# Patient Record
Sex: FEMALE | Race: WHITE | Employment: OTHER | URBAN - METROPOLITAN AREA
[De-identification: names, ages, dates, MRNs, and addresses within clinical notes are randomized per-mention and may not be internally consistent; named-entity substitution may affect disease eponyms.]

---

## 2020-07-20 RX ORDER — SODIUM CHLORIDE 0.9 % (FLUSH) 0.9 %
5-40 SYRINGE (ML) INJECTION AS NEEDED
Status: CANCELLED | OUTPATIENT
Start: 2020-07-20

## 2020-07-20 RX ORDER — SODIUM CHLORIDE 0.9 % (FLUSH) 0.9 %
5-40 SYRINGE (ML) INJECTION EVERY 8 HOURS
Status: CANCELLED | OUTPATIENT
Start: 2020-07-20

## 2020-07-22 ENCOUNTER — ANESTHESIA EVENT (OUTPATIENT)
Dept: SURGERY | Age: 80
End: 2020-07-22
Payer: MEDICARE

## 2020-07-23 ENCOUNTER — ANESTHESIA (OUTPATIENT)
Dept: SURGERY | Age: 80
End: 2020-07-23
Payer: MEDICARE

## 2020-07-23 ENCOUNTER — APPOINTMENT (OUTPATIENT)
Dept: GENERAL RADIOLOGY | Age: 80
End: 2020-07-23
Attending: ORTHOPAEDIC SURGERY
Payer: MEDICARE

## 2020-07-23 ENCOUNTER — HOSPITAL ENCOUNTER (OUTPATIENT)
Age: 80
Setting detail: OUTPATIENT SURGERY
Discharge: HOME | End: 2020-07-23
Attending: ORTHOPAEDIC SURGERY | Admitting: ORTHOPAEDIC SURGERY
Payer: MEDICARE

## 2020-07-23 VITALS
SYSTOLIC BLOOD PRESSURE: 204 MMHG | HEIGHT: 65 IN | TEMPERATURE: 98 F | HEART RATE: 69 BPM | RESPIRATION RATE: 16 BRPM | WEIGHT: 161 LBS | OXYGEN SATURATION: 94 % | DIASTOLIC BLOOD PRESSURE: 93 MMHG | BODY MASS INDEX: 26.82 KG/M2

## 2020-07-23 LAB — HGB BLD-MCNC: 11.3 G/DL (ref 11.7–15.4)

## 2020-07-23 PROCEDURE — 76060000033 HC ANESTHESIA 1 TO 1.5 HR: Performed by: ORTHOPAEDIC SURGERY

## 2020-07-23 PROCEDURE — 85018 HEMOGLOBIN: CPT

## 2020-07-23 PROCEDURE — A4565 SLINGS: HCPCS | Performed by: ORTHOPAEDIC SURGERY

## 2020-07-23 PROCEDURE — 77030002986 HC SUT PROL J&J -A: Performed by: ORTHOPAEDIC SURGERY

## 2020-07-23 PROCEDURE — 74011000250 HC RX REV CODE- 250: Performed by: NURSE ANESTHETIST, CERTIFIED REGISTERED

## 2020-07-23 PROCEDURE — 76010010054 HC POST OP PAIN BLOCK: Performed by: ORTHOPAEDIC SURGERY

## 2020-07-23 PROCEDURE — C1713 ANCHOR/SCREW BN/BN,TIS/BN: HCPCS | Performed by: ORTHOPAEDIC SURGERY

## 2020-07-23 PROCEDURE — 77030033681 HC SPLNT P-CUT SAF BSNM -A: Performed by: ORTHOPAEDIC SURGERY

## 2020-07-23 PROCEDURE — 74011250636 HC RX REV CODE- 250/636: Performed by: ANESTHESIOLOGY

## 2020-07-23 PROCEDURE — 77030003737 HC BIT DRL ACMD -C: Performed by: ORTHOPAEDIC SURGERY

## 2020-07-23 PROCEDURE — 76210000006 HC OR PH I REC 0.5 TO 1 HR: Performed by: ORTHOPAEDIC SURGERY

## 2020-07-23 PROCEDURE — 76010000161 HC OR TIME 1 TO 1.5 HR INTENSV-TIER 1: Performed by: ORTHOPAEDIC SURGERY

## 2020-07-23 PROCEDURE — 77030018836 HC SOL IRR NACL ICUM -A: Performed by: ORTHOPAEDIC SURGERY

## 2020-07-23 PROCEDURE — 77030000032 HC CUF TRNQT ZIMM -B: Performed by: ORTHOPAEDIC SURGERY

## 2020-07-23 PROCEDURE — 77030002933 HC SUT MCRYL J&J -A: Performed by: ORTHOPAEDIC SURGERY

## 2020-07-23 PROCEDURE — 77030008829 HC WRE K ACMD -B: Performed by: ORTHOPAEDIC SURGERY

## 2020-07-23 PROCEDURE — 77030010507 HC ADH SKN DERMBND J&J -B: Performed by: ORTHOPAEDIC SURGERY

## 2020-07-23 PROCEDURE — 76210000020 HC REC RM PH II FIRST 0.5 HR: Performed by: ORTHOPAEDIC SURGERY

## 2020-07-23 PROCEDURE — 74011250636 HC RX REV CODE- 250/636

## 2020-07-23 PROCEDURE — 74011000250 HC RX REV CODE- 250: Performed by: ANESTHESIOLOGY

## 2020-07-23 PROCEDURE — 76942 ECHO GUIDE FOR BIOPSY: CPT | Performed by: ORTHOPAEDIC SURGERY

## 2020-07-23 PROCEDURE — 77030003602 HC NDL NRV BLK BBMI -B: Performed by: ANESTHESIOLOGY

## 2020-07-23 PROCEDURE — 74011250636 HC RX REV CODE- 250/636: Performed by: NURSE ANESTHETIST, CERTIFIED REGISTERED

## 2020-07-23 DEVICE — 2.3MM X 14MM LKG VARIABLE ANGLE SCREW
Type: IMPLANTABLE DEVICE | Site: WRIST | Status: FUNCTIONAL
Brand: ACUMED

## 2020-07-23 DEVICE — 2.3MM X 16MM LOCKING CORTICAL SCREW
Type: IMPLANTABLE DEVICE | Site: WRIST | Status: FUNCTIONAL
Brand: ACUMED

## 2020-07-23 DEVICE — 2.3MM X 18MM LOCKING CORTICAL SCREW
Type: IMPLANTABLE DEVICE | Site: WRIST | Status: FUNCTIONAL
Brand: ACUMED

## 2020-07-23 DEVICE — 3.5MM X 10MM LOCKING HEXALOBE SCREW
Type: IMPLANTABLE DEVICE | Site: WRIST | Status: FUNCTIONAL
Brand: ACUMED

## 2020-07-23 DEVICE — 2.3MM X 14MM LOCKING CORTICAL SCREW
Type: IMPLANTABLE DEVICE | Site: WRIST | Status: FUNCTIONAL
Brand: ACUMED

## 2020-07-23 DEVICE — 3.5MM X 14MM NON-LOCKING HEXALOBE SCREW
Type: IMPLANTABLE DEVICE | Site: WRIST | Status: FUNCTIONAL
Brand: ACUMED

## 2020-07-23 DEVICE — 3.5MM X 12MM LOCKING HEXALOBE SCREW
Type: IMPLANTABLE DEVICE | Site: WRIST | Status: FUNCTIONAL
Brand: ACUMED

## 2020-07-23 DEVICE — ACU-LOC® 2 VDR PLT STD L
Type: IMPLANTABLE DEVICE | Site: WRIST | Status: FUNCTIONAL
Brand: ACUMED

## 2020-07-23 RX ORDER — ALBUTEROL SULFATE 0.83 MG/ML
2.5 SOLUTION RESPIRATORY (INHALATION) AS NEEDED
Status: DISCONTINUED | OUTPATIENT
Start: 2020-07-23 | End: 2020-07-23 | Stop reason: HOSPADM

## 2020-07-23 RX ORDER — OXYCODONE HYDROCHLORIDE 5 MG/1
5 TABLET ORAL
Status: DISCONTINUED | OUTPATIENT
Start: 2020-07-23 | End: 2020-07-23 | Stop reason: HOSPADM

## 2020-07-23 RX ORDER — ONDANSETRON 2 MG/ML
4 INJECTION INTRAMUSCULAR; INTRAVENOUS ONCE
Status: DISCONTINUED | OUTPATIENT
Start: 2020-07-23 | End: 2020-07-23 | Stop reason: HOSPADM

## 2020-07-23 RX ORDER — CEFAZOLIN SODIUM/WATER 2 G/20 ML
2 SYRINGE (ML) INTRAVENOUS ONCE
Status: COMPLETED | OUTPATIENT
Start: 2020-07-23 | End: 2020-07-23

## 2020-07-23 RX ORDER — LIDOCAINE HYDROCHLORIDE 10 MG/ML
0.1 INJECTION INFILTRATION; PERINEURAL AS NEEDED
Status: DISCONTINUED | OUTPATIENT
Start: 2020-07-23 | End: 2020-07-23 | Stop reason: HOSPADM

## 2020-07-23 RX ORDER — LIDOCAINE HYDROCHLORIDE 20 MG/ML
INJECTION, SOLUTION EPIDURAL; INFILTRATION; INTRACAUDAL; PERINEURAL AS NEEDED
Status: DISCONTINUED | OUTPATIENT
Start: 2020-07-23 | End: 2020-07-23 | Stop reason: HOSPADM

## 2020-07-23 RX ORDER — MIDAZOLAM HYDROCHLORIDE 1 MG/ML
2 INJECTION, SOLUTION INTRAMUSCULAR; INTRAVENOUS
Status: DISCONTINUED | OUTPATIENT
Start: 2020-07-23 | End: 2020-07-23 | Stop reason: HOSPADM

## 2020-07-23 RX ORDER — BUPIVACAINE HYDROCHLORIDE AND EPINEPHRINE 5; 5 MG/ML; UG/ML
INJECTION, SOLUTION EPIDURAL; INTRACAUDAL; PERINEURAL
Status: COMPLETED | OUTPATIENT
Start: 2020-07-23 | End: 2020-07-23

## 2020-07-23 RX ORDER — HYDROMORPHONE HYDROCHLORIDE 2 MG/ML
0.5 INJECTION, SOLUTION INTRAMUSCULAR; INTRAVENOUS; SUBCUTANEOUS
Status: DISCONTINUED | OUTPATIENT
Start: 2020-07-23 | End: 2020-07-23 | Stop reason: HOSPADM

## 2020-07-23 RX ORDER — DIPHENHYDRAMINE HYDROCHLORIDE 50 MG/ML
12.5 INJECTION, SOLUTION INTRAMUSCULAR; INTRAVENOUS
Status: DISCONTINUED | OUTPATIENT
Start: 2020-07-23 | End: 2020-07-23 | Stop reason: HOSPADM

## 2020-07-23 RX ORDER — SODIUM CHLORIDE 0.9 % (FLUSH) 0.9 %
5-40 SYRINGE (ML) INJECTION AS NEEDED
Status: DISCONTINUED | OUTPATIENT
Start: 2020-07-23 | End: 2020-07-23 | Stop reason: HOSPADM

## 2020-07-23 RX ORDER — PROPOFOL 10 MG/ML
INJECTION, EMULSION INTRAVENOUS AS NEEDED
Status: DISCONTINUED | OUTPATIENT
Start: 2020-07-23 | End: 2020-07-23 | Stop reason: HOSPADM

## 2020-07-23 RX ORDER — SODIUM CHLORIDE 0.9 % (FLUSH) 0.9 %
5-40 SYRINGE (ML) INJECTION EVERY 8 HOURS
Status: DISCONTINUED | OUTPATIENT
Start: 2020-07-23 | End: 2020-07-23 | Stop reason: HOSPADM

## 2020-07-23 RX ORDER — FENTANYL CITRATE 50 UG/ML
100 INJECTION, SOLUTION INTRAMUSCULAR; INTRAVENOUS ONCE
Status: DISCONTINUED | OUTPATIENT
Start: 2020-07-23 | End: 2020-07-23 | Stop reason: HOSPADM

## 2020-07-23 RX ORDER — OXYCODONE HYDROCHLORIDE 5 MG/1
10 TABLET ORAL
Status: DISCONTINUED | OUTPATIENT
Start: 2020-07-23 | End: 2020-07-23 | Stop reason: HOSPADM

## 2020-07-23 RX ORDER — MIDAZOLAM HYDROCHLORIDE 1 MG/ML
2 INJECTION, SOLUTION INTRAMUSCULAR; INTRAVENOUS ONCE
Status: COMPLETED | OUTPATIENT
Start: 2020-07-23 | End: 2020-07-23

## 2020-07-23 RX ORDER — SODIUM CHLORIDE, SODIUM LACTATE, POTASSIUM CHLORIDE, CALCIUM CHLORIDE 600; 310; 30; 20 MG/100ML; MG/100ML; MG/100ML; MG/100ML
100 INJECTION, SOLUTION INTRAVENOUS CONTINUOUS
Status: DISCONTINUED | OUTPATIENT
Start: 2020-07-23 | End: 2020-07-23 | Stop reason: HOSPADM

## 2020-07-23 RX ORDER — PROPOFOL 10 MG/ML
INJECTION, EMULSION INTRAVENOUS
Status: DISCONTINUED | OUTPATIENT
Start: 2020-07-23 | End: 2020-07-23 | Stop reason: HOSPADM

## 2020-07-23 RX ORDER — NALOXONE HYDROCHLORIDE 0.4 MG/ML
0.1 INJECTION, SOLUTION INTRAMUSCULAR; INTRAVENOUS; SUBCUTANEOUS AS NEEDED
Status: DISCONTINUED | OUTPATIENT
Start: 2020-07-23 | End: 2020-07-23 | Stop reason: HOSPADM

## 2020-07-23 RX ADMIN — SODIUM CHLORIDE, SODIUM LACTATE, POTASSIUM CHLORIDE, AND CALCIUM CHLORIDE 100 ML/HR: 600; 310; 30; 20 INJECTION, SOLUTION INTRAVENOUS at 06:50

## 2020-07-23 RX ADMIN — LIDOCAINE HYDROCHLORIDE 20 MG: 20 INJECTION, SOLUTION EPIDURAL; INFILTRATION; INTRACAUDAL; PERINEURAL at 08:05

## 2020-07-23 RX ADMIN — BUPIVACAINE HYDROCHLORIDE AND EPINEPHRINE BITARTRATE 10 ML: 5; .005 INJECTION, SOLUTION EPIDURAL; INTRACAUDAL; PERINEURAL at 07:04

## 2020-07-23 RX ADMIN — PROPOFOL 40 MG: 10 INJECTION, EMULSION INTRAVENOUS at 08:05

## 2020-07-23 RX ADMIN — Medication 2 G: at 08:03

## 2020-07-23 RX ADMIN — MIDAZOLAM 2 MG: 1 INJECTION INTRAMUSCULAR; INTRAVENOUS at 07:01

## 2020-07-23 RX ADMIN — MEPIVACAINE HYDROCHLORIDE 20 ML: 20 INJECTION, SOLUTION EPIDURAL; INFILTRATION at 07:04

## 2020-07-23 RX ADMIN — PROPOFOL 100 MCG/KG/MIN: 10 INJECTION, EMULSION INTRAVENOUS at 08:05

## 2020-07-23 NOTE — ANESTHESIA PROCEDURE NOTES
Peripheral Block    Start time: 7/23/2020 7:01 AM  End time: 7/23/2020 7:04 AM  Performed by: Abdiel Motta MD  Authorized by: Abdiel Motta MD       Pre-procedure:    Indications: at surgeon's request and post-op pain management    Preanesthetic Checklist: patient identified, risks and benefits discussed, site marked, timeout performed, anesthesia consent given and patient being monitored    Timeout Time: 07:01          Block Type:   Block Type:  Infraclavicular  Laterality:  Right  Monitoring:  Standard ASA monitoring, continuous pulse ox, frequent vital sign checks, heart rate, oxygen and responsive to questions  Injection Technique:  Single shot  Procedures: ultrasound guided    Patient Position: supine  Prep: chlorhexidine    Needle Type:  Stimuplex  Needle Gauge:  21 G  Needle Localization:  Ultrasound guidance and anatomical landmarks    Assessment:  Number of attempts:  1  Injection Assessment:  Incremental injection every 5 mL, local visualized surrounding nerve on ultrasound, negative aspiration for blood, no paresthesia, no intravascular symptoms and ultrasound image on chart  Patient tolerance:  Patient tolerated the procedure well with no immediate complications

## 2020-07-23 NOTE — BRIEF OP NOTE
Brief Postoperative Note    Patient: Gertrude Rosario  YOB: 1940  MRN: [id number]    Date of Procedure: 7/23/2020     Pre-Op Diagnosis: Displaced fracture of distal end of left radius [S52.502A]    Post-Op Diagnosis: Same as preoperative diagnosis. Procedure(s):  LEFT DISTAL RADIUS OPEN REDUCTION INTERNAL FIXATION    Surgeon(s): Romayne Parkinson, MD    Surgical Assistant: None    Anesthesia: Regional     Estimated Blood Loss (mL): Minimal    Complications: None    Specimens: * No specimens in log *     Implants:   Implant Name Type Inv.  Item Serial No.  Lot No. LRB No. Used Action   SCREW 3.5MM X 14MM NON-LOCKING - [id number]  SCREW 3.5MM X 14MM NON-LOCKING  [employer] [id number] Left 1 Implanted   SCR BNE AUDREY LCK BRIAN 2.3X18MM --  - [id number]  SCR BNE AUDREY LCK BRIAN 2.3X18MM --   [employer] [id number] Left 4 Implanted   SCR BNE AUDREY LCK BRIAN 2.3X16MM --  - [id number]  SCR BNE AUDREY LCK BRIAN 2.3X16MM --   [employer] [id number] Left 1 Implanted   SCR BNE AUDREY LCK BRIAN 2.3X14MM --  - [id number]  SCR BNE AUDREY LCK BRIAN 2.3X14MM --   [employer] [id number] Left 1 Implanted   PLATE STD VDR ACULOC 2 LT --  - [id number]  PLATE STD VDR ACULOC 2 LT --   [employer] [id number] Left 1 Implanted   SCR BNE LCK VA 2.3X14MM --  - [id number]  SCR BNE LCK VA 2.3X14MM --   [employer] [id number] Left 1 Implanted   SCR BNE LCK HEXALOBE 3.5X12MM -- F/ELBOW PLT SYS - [id number]  SCR BNE LCK HEXALOBE 3.5X12MM -- F/ELBOW PLT SYS  [employer] [id number] Left 1 Implanted   SCR BNE LCK HEXALOBE 3.5X10MM -- F/ELBOW PLT SYS - [id number]  SCR BNE LCK HEXALOBE 3.5X10MM -- F/ELBOW PLT SYS  [employer] [id number] Left 1 Implanted       Drains: * No LDAs found *    Findings: see dictation    Electronically Signed by Odilia Gomez MD on 7/23/2020 at 9:03 AM

## 2020-07-23 NOTE — DISCHARGE INSTRUCTIONS
Keep splint clean, dry and intact until seen in office. Move fingers, elevate, and ice to prevent swelling. No lifting. Sling can be taken off after block wears off in about 16-18 hours. Take pain medicine before she goes to bed. DIET  · Clear liquids until no nausea or vomiting; then light diet for the first day. · Advance to regular diet on second day, unless your doctor orders otherwise. · If nausea and vomiting continues, call your doctor. PAIN  · Take pain medication as directed by your doctor. · Call your doctor if pain is NOT relieved by medication. · DO NOT take aspirin of blood thinners unless directed by your doctor. CALL YOUR DOCTOR IF   · Excessive bleeding that does not stop after holding pressure over the area  · Temperature of 101 degrees F or above  · Excessive redness, swelling or bruising, and/ or green or yellow, smelly discharge from incision    AFTER ANESTHESIA   · For the first 24 hours: DO NOT Drive, Drink alcoholic beverages, or Make important decisions. · Be aware of dizziness following anesthesia and while taking pain medication. After general anesthesia or intravenous sedation, for 24 hours or while taking prescription Narcotics:  · Limit your activities  · A responsible adult needs to be with you for the next 24 hours  · Do not drive and operate hazardous machinery  · Do not make important personal or business decisions  · Do not drink alcoholic beverages  · If you have not urinated within 8 hours after discharge, please contact your surgeon on call. · If you have sleep apnea and have a CPAP machine, please use it for all naps and sleeping. · Please use caution when taking narcotics and any of your home medications that may cause drowsiness. *  Please give a list of your current medications to your Primary Care Provider.   *  Please update this list whenever your medications are discontinued, doses are      changed, or new medications (including over-the-counter products) are added. *  Please carry medication information at all times in case of emergency situations. These are general instructions for a healthy lifestyle:  No smoking/ No tobacco products/ Avoid exposure to second hand smoke  Surgeon General's Warning:  Quitting smoking now greatly reduces serious risk to your health. Obesity, smoking, and sedentary lifestyle greatly increases your risk for illness  A healthy diet, regular physical exercise & weight monitoring are important for maintaining a healthy lifestyle    You may be retaining fluid if you have a history of heart failure or if you experience any of the following symptoms:  Weight gain of 3 pounds or more overnight or 5 pounds in a week, increased swelling in our hands or feet or shortness of breath while lying flat in bed. Please call your doctor as soon as you notice any of these symptoms; do not wait until your next office visit. Advance Care Planning  People with COVID-19 may have no symptoms, mild symptoms, such as fever, cough, and shortness of breath or they may have more severe illness, developing severe and fatal pneumonia. As a result, Advance Care Planning with attention to naming a health care decision maker (someone you trust to make healthcare decisions for you if you could not speak for yourself) and sharing other health care preferences is important BEFORE a possible health crisis. Please contact your Primary Care Provider to discuss Advance Care Planning.      Preventing the Spread of Coronavirus Disease 2019 in Homes and Residential Communities  For the most recent information go to [employer]    Prevention steps for People with confirmed or suspected COVID-19 (including persons under investigation) who do not need to be hospitalized  and   People with confirmed COVID-19 who were hospitalized and determined to be medically stable to go home    Your healthcare provider and public health staff will evaluate whether you can be cared for at home. If it is determined that you do not need to be hospitalized and can be isolated at home, you will be monitored by staff from your local or state health department. You should follow the prevention steps below until a healthcare provider or local or state health department says you can return to your normal activities. Stay home except to get medical care  People who are mildly ill with COVID-19 are able to isolate at home during their illness. You should restrict activities outside your home, except for getting medical care. Do not go to work, school, or public areas. Avoid using public transportation, ride-sharing, or taxis. Separate yourself from other people and animals in your home  People: As much as possible, you should stay in a specific room and away from other people in your home. Also, you should use a separate bathroom, if available. Animals: You should restrict contact with pets and other animals while you are sick with COVID-19, just like you would around other people. Although there have not been reports of pets or other animals becoming sick with COVID-19, it is still recommended that people sick with COVID-19 limit contact with animals until more information is known about the virus. When possible, have another member of your household care for your animals while you are sick. If you are sick with COVID-19, avoid contact with your pet, including petting, snuggling, being kissed or licked, and sharing food. If you must care for your pet or be around animals while you are sick, wash your hands before and after you interact with pets and wear a facemask. Call ahead before visiting your doctor  If you have a medical appointment, call the healthcare provider and tell them that you have or may have COVID-19.  This will help the healthcare providers office take steps to keep other people from getting infected or exposed. Wear a facemask  You should wear a facemask when you are around other people (e.g., sharing a room or vehicle) or pets and before you enter a healthcare providers office. If you are not able to wear a facemask (for example, because it causes trouble breathing), then people who live with you should not stay in the same room with you, or they should wear a facemask if they enter your room. Cover your coughs and sneezes  Cover your mouth and nose with a tissue when you cough or sneeze. Throw used tissues in a lined trash can. Immediately wash your hands with soap and water for at least 20 seconds or, if soap and water are not available, clean your hands with an alcohol-based hand  that contains at least 60% alcohol. Clean your hands often  Wash your hands often with soap and water for at least 20 seconds, especially after blowing your nose, coughing, or sneezing; going to the bathroom; and before eating or preparing food. If soap and water are not readily available, use an alcohol-based hand  with at least 60% alcohol, covering all surfaces of your hands and rubbing them together until they feel dry. Soap and water are the best option if hands are visibly dirty. Avoid touching your eyes, nose, and mouth with unwashed hands. Avoid sharing personal household items  You should not share dishes, drinking glasses, cups, eating utensils, towels, or bedding with other people or pets in your home. After using these items, they should be washed thoroughly with soap and water. Clean all high-touch surfaces everyday  High touch surfaces include counters, tabletops, doorknobs, bathroom fixtures, toilets, phones, keyboards, tablets, and bedside tables. Also, clean any surfaces that may have blood, stool, or body fluids on them. Use a household cleaning spray or wipe, according to the label instructions.  Labels contain instructions for safe and effective use of the cleaning product including precautions you should take when applying the product, such as wearing gloves and making sure you have good ventilation during use of the product. Monitor your symptoms  Seek prompt medical attention if your illness is worsening (e.g., difficulty breathing). Before seeking care, call your healthcare provider and tell them that you have, or are being evaluated for, COVID-19. Put on a facemask before you enter the facility. These steps will help the healthcare providers office to keep other people in the office or waiting room from getting infected or exposed. Ask your healthcare provider to call the local or state health department. Persons who are placed under active monitoring or facilitated self-monitoring should follow instructions provided by their local health department or occupational health professionals, as appropriate. When working with your local health department check their available hours. If you have a medical emergency and need to call 911, notify the dispatch personnel that you have, or are being evaluated for COVID-19. If possible, put on a facemask before emergency medical services arrive. Discontinuing home isolation  Patients with confirmed COVID-19 should remain under home isolation precautions until the risk of secondary transmission to others is thought to be low. The decision to discontinue home isolation precautions should be made on a case-by-case basis, in consultation with healthcare providers and state and local health departments.

## 2020-07-23 NOTE — ANESTHESIA POSTPROCEDURE EVALUATION
Procedure(s):  LEFT DISTAL RADIUS OPEN REDUCTION INTERNAL FIXATION.     total IV anesthesia    Anesthesia Post Evaluation      Multimodal analgesia: multimodal analgesia used between 6 hours prior to anesthesia start to PACU discharge  Patient location during evaluation: PACU  Patient participation: complete - patient participated  Level of consciousness: awake and awake and alert  Pain management: adequate  Airway patency: patent  Anesthetic complications: no  Cardiovascular status: acceptable  Respiratory status: acceptable  Hydration status: acceptable  Post anesthesia nausea and vomiting:  controlled      INITIAL Post-op Vital signs:   Vitals Value Taken Time   /86 7/23/2020  9:38 AM   Temp 36.8 °C (98.2 °F) 7/23/2020  9:08 AM   Pulse 64 7/23/2020  9:38 AM   Resp 16 7/23/2020  9:38 AM   SpO2 93 % 7/23/2020  9:38 AM

## 2020-07-23 NOTE — PROGRESS NOTES
's pre-procedure visit and prayer with patient as requested.     Rita Gaitan MDiv, BS  Board Certified

## 2020-07-23 NOTE — ANESTHESIA PREPROCEDURE EVALUATION
Relevant Problems   No relevant active problems       Anesthetic History               Review of Systems / Medical History  Patient summary reviewed, nursing notes reviewed and pertinent labs reviewed    Pulmonary                   Neuro/Psych              Cardiovascular                  Exercise tolerance: >4 METS     GI/Hepatic/Renal                Endo/Other             Other Findings              Physical Exam    Airway  Mallampati: II  TM Distance: 4 - 6 cm  Neck ROM: normal range of motion   Mouth opening: Normal     Cardiovascular  Regular rate and rhythm,  S1 and S2 normal,  no murmur, click, rub, or gallop             Dental  No notable dental hx       Pulmonary  Breath sounds clear to auscultation               Abdominal         Other Findings            Anesthetic Plan    ASA: 2  Anesthesia type: total IV anesthesia      Post-op pain plan if not by surgeon: peripheral nerve block single    Induction: Intravenous  Anesthetic plan and risks discussed with: Patient

## 2020-07-23 NOTE — PERIOP NOTES
9:29 AM    Notified Dr. Rivka Mooer about patient having frequent PAC's. He does not want to proceed with any treatment.

## 2020-07-27 NOTE — OP NOTES
Operative Report       7/23/20  Preoperative diagnosis:  Displaced fracture of distal end of left radius [S52.502A]    Postoperative diagnosis: Displaced fracture of distal end of left radius [S52.502A]    Surgeon(s) and Role:     * Bill Ibarra MD - Primary     Anesthesia: Regional Local with MAC. Procedures: Procedure(s):  LEFT DISTAL RADIUS OPEN REDUCTION INTERNAL FIXATION   3 part intra articular       EBL/IV FLUIDS: Per Anesthesia. COMPLICATIONS: None. DISPOSITION: Stable to recovery room. INDICATIONS FOR PROCEDURE: The patient is a pleasant 76-year-old female with history of left displaced distal radius fracture that has failed nonoperative measures. Risks and benefits of the procedure were discussed including but not limited to bleeding, infection, injury to adjacent structures , consisting of tendon, artery or nerve, need for additional procedures, wound dehiscence, scar formation, incomplete resolution of symptoms, recurrence of symptoms, transient neurapraxia, decreased range of motion, hypersensitivity, recurrence of deformity, pin tract infection, malunion, nonunion, failure of hardware, need for removal of hardware, irritation of tendon, artery, decreased range of motion, stiffness, pain, as well as anesthetic risk. Informed consent was obtained. PROCEDURE IN DETAIL: The patient was seen and marked in the preoperative suite. The patient was taken back to the OR, placed on the table in supine position with left upper extremities on hand tables. Left upper extremities were prepped and draped in standard sterile fashion. A formal timeout was performed confirming patient identification, preoperative antibiotics, and planned operative procedure. Anesthesia administered a plexus block prior. We exsanguinated the left upper extremity and the tourniquet was placed up to 250 mmHg. A standard FCR incision was made dissecting down retracting the FPL.   The pronator quadratus was ruptured. It was then incised with a standard L fashion. We performed a Z lengthening brachial radialis release. This was indeed a greater than 3 part intra-articular fracture with a large volar dominguez fracture and a secondary split over the lunate side into the joint. We utilized traction in order to pull the fracture out to length. We keyed the fracture pieces back into position. We utilized an acumed volar locked plate to help reduce and hold the fragments back into anatomic position. We needed to use a standard plate in order to get the lunate butress so that made our radial styloid screws slightly shorter. The narrow plate did not give the the support needed. Pins were placed to make sure we were subchondral.  We sequentially drilled and filled with distal locking screws we drilled locking screws in the shaft. Final radiographs demonstrated adequate reduction of the fracture and placement of the hardware. We irrigated copiously with normal saline. Her DRUJ was stable at the end of the procedure. We repaired the brachial radialis release. We closed the skin with the subcutaneous Monocryl and a running subcuticular Monocryl and Dermabond glue. She was then placed into a volar splint. The tourniquet was let down, the fingers had brisk capillary refill and a soft sterile dressing was placed. POSTOPERATIVE CARE: Early motion. No heavy lifting.  Followup in 2 weeks for suture removal.    Closure: Primary    Complications: None     Signed By: Christophe Max MD

## 2021-02-11 PROBLEM — G25.81 RLS (RESTLESS LEGS SYNDROME): Status: ACTIVE | Noted: 2021-02-11

## 2021-02-11 PROBLEM — R05.3 CHRONIC COUGH: Status: ACTIVE | Noted: 2021-02-11

## 2021-02-11 PROBLEM — R29.818 SUSPECTED SLEEP APNEA: Status: ACTIVE | Noted: 2021-02-11

## 2021-02-11 PROBLEM — R06.09 DYSPNEA ON EXERTION: Status: ACTIVE | Noted: 2021-02-11

## 2021-03-08 ENCOUNTER — HOSPITAL ENCOUNTER (OUTPATIENT)
Dept: SLEEP MEDICINE | Age: 81
Discharge: HOME | End: 2021-03-08
Payer: MEDICARE

## 2021-03-08 PROBLEM — I48.11 LONGSTANDING PERSISTENT ATRIAL FIBRILLATION (HCC): Status: ACTIVE | Noted: 2021-03-08

## 2021-03-08 PROCEDURE — 95806 SLEEP STUDY UNATT&RESP EFFT: CPT

## 2021-04-12 ENCOUNTER — HOSPITAL ENCOUNTER (OUTPATIENT)
Dept: LAB | Age: 81
Discharge: HOME | End: 2021-04-12
Payer: MEDICARE

## 2021-04-12 DIAGNOSIS — R93.1 ABNORMAL NUCLEAR CARDIAC IMAGING TEST: ICD-10-CM

## 2021-04-12 DIAGNOSIS — R06.09 DYSPNEA ON EXERTION: ICD-10-CM

## 2021-04-12 DIAGNOSIS — I48.11 LONGSTANDING PERSISTENT ATRIAL FIBRILLATION (HCC): ICD-10-CM

## 2021-04-12 LAB
ANION GAP SERPL CALC-SCNC: 2 MMOL/L (ref 7–16)
BASOPHILS # BLD: 0.1 K/UL (ref 0–0.2)
BASOPHILS NFR BLD: 1 % (ref 0–2)
BUN SERPL-MCNC: 23 MG/DL (ref 8–23)
CALCIUM SERPL-MCNC: 9.4 MG/DL (ref 8.3–10.4)
CHLORIDE SERPL-SCNC: 108 MMOL/L (ref 98–107)
CO2 SERPL-SCNC: 29 MMOL/L (ref 21–32)
CREAT SERPL-MCNC: 0.94 MG/DL (ref 0.6–1)
DIFFERENTIAL METHOD BLD: ABNORMAL
EOSINOPHIL # BLD: 0.1 K/UL (ref 0–0.8)
EOSINOPHIL NFR BLD: 1 % (ref 0.5–7.8)
ERYTHROCYTE [DISTWIDTH] IN BLOOD BY AUTOMATED COUNT: 14.5 % (ref 11.9–14.6)
GLUCOSE SERPL-MCNC: 96 MG/DL (ref 65–100)
HCT VFR BLD AUTO: 37.3 % (ref 35.8–46.3)
HGB BLD-MCNC: 12.1 G/DL (ref 11.7–15.4)
IMM GRANULOCYTES # BLD AUTO: 0.2 K/UL (ref 0–0.5)
IMM GRANULOCYTES NFR BLD AUTO: 3 % (ref 0–5)
INR PPP: 2.1
LYMPHOCYTES # BLD: 2 K/UL (ref 0.5–4.6)
LYMPHOCYTES NFR BLD: 22 % (ref 13–44)
MCH RBC QN AUTO: 32 PG (ref 26.1–32.9)
MCHC RBC AUTO-ENTMCNC: 32.4 G/DL (ref 31.4–35)
MCV RBC AUTO: 98.7 FL (ref 79.6–97.8)
MONOCYTES # BLD: 0.5 K/UL (ref 0.1–1.3)
MONOCYTES NFR BLD: 5 % (ref 4–12)
NEUTS SEG # BLD: 6 K/UL (ref 1.7–8.2)
NEUTS SEG NFR BLD: 68 % (ref 43–78)
NRBC # BLD: 0 K/UL (ref 0–0.2)
PLATELET # BLD AUTO: 162 K/UL (ref 150–450)
PMV BLD AUTO: 11.7 FL (ref 9.4–12.3)
POTASSIUM SERPL-SCNC: 4.5 MMOL/L (ref 3.5–5.1)
PROTHROMBIN TIME: 23.3 SEC (ref 12.5–14.7)
RBC # BLD AUTO: 3.78 M/UL (ref 4.05–5.2)
SODIUM SERPL-SCNC: 139 MMOL/L (ref 136–145)
WBC # BLD AUTO: 8.9 K/UL (ref 4.3–11.1)

## 2021-04-12 PROCEDURE — 85025 COMPLETE CBC W/AUTO DIFF WBC: CPT

## 2021-04-12 PROCEDURE — 80048 BASIC METABOLIC PNL TOTAL CA: CPT

## 2021-04-12 PROCEDURE — 36415 COLL VENOUS BLD VENIPUNCTURE: CPT

## 2021-04-12 PROCEDURE — 85610 PROTHROMBIN TIME: CPT

## 2021-04-21 NOTE — PROGRESS NOTES
Patient pre-assessment complete for [facility] scheduled for 1100, arrival time 0930. Patient verified using . Patient instructed to bring all home medications in labeled bottles on the day of procedure. NPO status reinforced. Patient informed to take a full dose aspirin 325mg  or 81 mg x 4 on the day of procedure. Patient instructed to HOLD lasix, warfarin X3 days. Instructed they can take all other medications excluding vitamins & supplements. Patient verbalizes understanding of all instructions & denies any questions at this time.

## 2021-04-22 ENCOUNTER — HOSPITAL ENCOUNTER (OUTPATIENT)
Dept: CARDIAC CATH/INVASIVE PROCEDURES | Age: 81
Discharge: HOME | End: 2021-04-23
Attending: INTERNAL MEDICINE | Admitting: INTERNAL MEDICINE
Payer: MEDICARE

## 2021-04-22 DIAGNOSIS — R93.1 ABNORMAL NUCLEAR CARDIAC IMAGING TEST: ICD-10-CM

## 2021-04-22 DIAGNOSIS — R06.09 DYSPNEA ON EXERTION: ICD-10-CM

## 2021-04-22 LAB
ACT BLD: 401 SECS (ref 70–128)
ATRIAL RATE: 326 BPM
ATRIAL RATE: 68 BPM
CALCULATED R AXIS, ECG10: -12 DEGREES
CALCULATED R AXIS, ECG10: -43 DEGREES
CALCULATED T AXIS, ECG11: -6 DEGREES
CALCULATED T AXIS, ECG11: 1 DEGREES
DIAGNOSIS, 93000: NORMAL
DIAGNOSIS, 93000: NORMAL
INR PPP: 1.1
PROTHROMBIN TIME: 14.3 SEC (ref 12.5–14.7)
Q-T INTERVAL, ECG07: 386 MS
Q-T INTERVAL, ECG07: 412 MS
QRS DURATION, ECG06: 84 MS
QRS DURATION, ECG06: 98 MS
QTC CALCULATION (BEZET), ECG08: 413 MS
QTC CALCULATION (BEZET), ECG08: 441 MS
VENTRICULAR RATE, ECG03: 69 BPM
VENTRICULAR RATE, ECG03: 69 BPM

## 2021-04-22 PROCEDURE — 77030029997 HC DEV COM RDL R BND TELE -B

## 2021-04-22 PROCEDURE — C1725 CATH, TRANSLUMIN NON-LASER: HCPCS

## 2021-04-22 PROCEDURE — 74011000250 HC RX REV CODE- 250: Performed by: INTERNAL MEDICINE

## 2021-04-22 PROCEDURE — 99152 MOD SED SAME PHYS/QHP 5/>YRS: CPT | Performed by: INTERNAL MEDICINE

## 2021-04-22 PROCEDURE — 74011250637 HC RX REV CODE- 250/637: Performed by: INTERNAL MEDICINE

## 2021-04-22 PROCEDURE — C1769 GUIDE WIRE: HCPCS

## 2021-04-22 PROCEDURE — 77030012468 HC VLV BLEEDBK CNTRL ABBT -B

## 2021-04-22 PROCEDURE — C1876 STENT, NON-COA/NON-COV W/DEL: HCPCS

## 2021-04-22 PROCEDURE — 93458 L HRT ARTERY/VENTRICLE ANGIO: CPT | Performed by: INTERNAL MEDICINE

## 2021-04-22 PROCEDURE — 99152 MOD SED SAME PHYS/QHP 5/>YRS: CPT

## 2021-04-22 PROCEDURE — 85610 PROTHROMBIN TIME: CPT

## 2021-04-22 PROCEDURE — 99218 HC RM OBSERVATION: CPT

## 2021-04-22 PROCEDURE — 92928 PRQ TCAT PLMT NTRAC ST 1 LES: CPT

## 2021-04-22 PROCEDURE — 74011000258 HC RX REV CODE- 258: Performed by: INTERNAL MEDICINE

## 2021-04-22 PROCEDURE — C1874 STENT, COATED/COV W/DEL SYS: HCPCS

## 2021-04-22 PROCEDURE — 93571 IV DOP VEL&/PRESS C FLO 1ST: CPT

## 2021-04-22 PROCEDURE — 93571 IV DOP VEL&/PRESS C FLO 1ST: CPT | Performed by: INTERNAL MEDICINE

## 2021-04-22 PROCEDURE — 77030015766

## 2021-04-22 PROCEDURE — 93458 L HRT ARTERY/VENTRICLE ANGIO: CPT

## 2021-04-22 PROCEDURE — C1887 CATHETER, GUIDING: HCPCS

## 2021-04-22 PROCEDURE — 92928 PRQ TCAT PLMT NTRAC ST 1 LES: CPT | Performed by: INTERNAL MEDICINE

## 2021-04-22 PROCEDURE — 93005 ELECTROCARDIOGRAM TRACING: CPT | Performed by: INTERNAL MEDICINE

## 2021-04-22 PROCEDURE — 74011250636 HC RX REV CODE- 250/636: Performed by: INTERNAL MEDICINE

## 2021-04-22 PROCEDURE — C1894 INTRO/SHEATH, NON-LASER: HCPCS

## 2021-04-22 PROCEDURE — 99153 MOD SED SAME PHYS/QHP EA: CPT

## 2021-04-22 PROCEDURE — 74011000636 HC RX REV CODE- 636: Performed by: INTERNAL MEDICINE

## 2021-04-22 PROCEDURE — 77030016699 HC CATH ANGI DX INFN1 CARD -A

## 2021-04-22 PROCEDURE — 85347 COAGULATION TIME ACTIVATED: CPT

## 2021-04-22 PROCEDURE — 2709999900 HC NON-CHARGEABLE SUPPLY

## 2021-04-22 RX ORDER — MORPHINE SULFATE 2 MG/ML
2 INJECTION, SOLUTION INTRAMUSCULAR; INTRAVENOUS
Status: DISCONTINUED | OUTPATIENT
Start: 2021-04-22 | End: 2021-04-23 | Stop reason: HOSPADM

## 2021-04-22 RX ORDER — LISINOPRIL 5 MG/1
5 TABLET ORAL DAILY
Status: DISCONTINUED | OUTPATIENT
Start: 2021-04-22 | End: 2021-04-23 | Stop reason: HOSPADM

## 2021-04-22 RX ORDER — CLOPIDOGREL BISULFATE 75 MG/1
600 TABLET ORAL ONCE
Status: COMPLETED | OUTPATIENT
Start: 2021-04-22 | End: 2021-04-22

## 2021-04-22 RX ORDER — SODIUM CHLORIDE 0.9 % (FLUSH) 0.9 %
5-40 SYRINGE (ML) INJECTION EVERY 8 HOURS
Status: DISCONTINUED | OUTPATIENT
Start: 2021-04-22 | End: 2021-04-23 | Stop reason: HOSPADM

## 2021-04-22 RX ORDER — SODIUM CHLORIDE 9 MG/ML
75 INJECTION, SOLUTION INTRAVENOUS CONTINUOUS
Status: DISPENSED | OUTPATIENT
Start: 2021-04-22 | End: 2021-04-23

## 2021-04-22 RX ORDER — GABAPENTIN 100 MG/1
100 CAPSULE ORAL DAILY
Status: DISCONTINUED | OUTPATIENT
Start: 2021-04-22 | End: 2021-04-23 | Stop reason: HOSPADM

## 2021-04-22 RX ORDER — FUROSEMIDE 40 MG/1
20 TABLET ORAL DAILY
Status: DISCONTINUED | OUTPATIENT
Start: 2021-04-23 | End: 2021-04-23 | Stop reason: HOSPADM

## 2021-04-22 RX ORDER — NITROGLYCERIN 0.4 MG/1
0.4 TABLET SUBLINGUAL
Status: DISCONTINUED | OUTPATIENT
Start: 2021-04-22 | End: 2021-04-23 | Stop reason: HOSPADM

## 2021-04-22 RX ORDER — OXYCODONE AND ACETAMINOPHEN 7.5; 325 MG/1; MG/1
1 TABLET ORAL
Status: DISCONTINUED | OUTPATIENT
Start: 2021-04-22 | End: 2021-04-23 | Stop reason: HOSPADM

## 2021-04-22 RX ORDER — MIDAZOLAM HYDROCHLORIDE 1 MG/ML
.5-2 INJECTION, SOLUTION INTRAMUSCULAR; INTRAVENOUS
Status: DISCONTINUED | OUTPATIENT
Start: 2021-04-22 | End: 2021-04-22 | Stop reason: HOSPADM

## 2021-04-22 RX ORDER — ASPIRIN 325 MG
325 TABLET ORAL DAILY
Status: DISCONTINUED | OUTPATIENT
Start: 2021-04-23 | End: 2021-04-22

## 2021-04-22 RX ORDER — LISINOPRIL 5 MG/1
5 TABLET ORAL DAILY
Status: DISCONTINUED | OUTPATIENT
Start: 2021-04-23 | End: 2021-04-22

## 2021-04-22 RX ORDER — GUAIFENESIN 100 MG/5ML
325 LIQUID (ML) ORAL ONCE
Status: DISCONTINUED | OUTPATIENT
Start: 2021-04-22 | End: 2021-04-22 | Stop reason: HOSPADM

## 2021-04-22 RX ORDER — HEPARIN SODIUM 200 [USP'U]/100ML
3 INJECTION, SOLUTION INTRAVENOUS CONTINUOUS
Status: DISCONTINUED | OUTPATIENT
Start: 2021-04-22 | End: 2021-04-22 | Stop reason: HOSPADM

## 2021-04-22 RX ORDER — ACETAMINOPHEN 325 MG/1
650 TABLET ORAL
Status: DISCONTINUED | OUTPATIENT
Start: 2021-04-22 | End: 2021-04-23 | Stop reason: HOSPADM

## 2021-04-22 RX ORDER — SODIUM CHLORIDE 0.9 % (FLUSH) 0.9 %
5-40 SYRINGE (ML) INJECTION AS NEEDED
Status: DISCONTINUED | OUTPATIENT
Start: 2021-04-22 | End: 2021-04-23 | Stop reason: HOSPADM

## 2021-04-22 RX ORDER — LIDOCAINE HYDROCHLORIDE 10 MG/ML
2-20 INJECTION, SOLUTION EPIDURAL; INFILTRATION; INTRACAUDAL; PERINEURAL ONCE
Status: COMPLETED | OUTPATIENT
Start: 2021-04-22 | End: 2021-04-22

## 2021-04-22 RX ORDER — FENTANYL CITRATE 50 UG/ML
25-50 INJECTION, SOLUTION INTRAMUSCULAR; INTRAVENOUS
Status: DISCONTINUED | OUTPATIENT
Start: 2021-04-22 | End: 2021-04-22 | Stop reason: HOSPADM

## 2021-04-22 RX ORDER — METOPROLOL SUCCINATE 50 MG/1
25 TABLET, EXTENDED RELEASE ORAL DAILY
Status: DISCONTINUED | OUTPATIENT
Start: 2021-04-23 | End: 2021-04-23 | Stop reason: HOSPADM

## 2021-04-22 RX ORDER — MAG HYDROX/ALUMINUM HYD/SIMETH 200-200-20
30 SUSPENSION, ORAL (FINAL DOSE FORM) ORAL AS NEEDED
Status: DISCONTINUED | OUTPATIENT
Start: 2021-04-22 | End: 2021-04-22 | Stop reason: HOSPADM

## 2021-04-22 RX ORDER — SODIUM CHLORIDE 0.9 % (FLUSH) 0.9 %
5 SYRINGE (ML) INJECTION AS NEEDED
Status: DISCONTINUED | OUTPATIENT
Start: 2021-04-22 | End: 2021-04-23 | Stop reason: HOSPADM

## 2021-04-22 RX ORDER — LORAZEPAM 1 MG/1
1 TABLET ORAL
Status: DISCONTINUED | OUTPATIENT
Start: 2021-04-22 | End: 2021-04-23 | Stop reason: HOSPADM

## 2021-04-22 RX ORDER — SODIUM CHLORIDE 9 MG/ML
75 INJECTION, SOLUTION INTRAVENOUS CONTINUOUS
Status: DISCONTINUED | OUTPATIENT
Start: 2021-04-22 | End: 2021-04-23 | Stop reason: HOSPADM

## 2021-04-22 RX ORDER — CLOPIDOGREL BISULFATE 75 MG/1
75 TABLET ORAL DAILY
Status: DISCONTINUED | OUTPATIENT
Start: 2021-04-23 | End: 2021-04-23 | Stop reason: HOSPADM

## 2021-04-22 RX ORDER — GUAIFENESIN 100 MG/5ML
81 LIQUID (ML) ORAL DAILY
Status: DISCONTINUED | OUTPATIENT
Start: 2021-04-23 | End: 2021-04-23 | Stop reason: HOSPADM

## 2021-04-22 RX ADMIN — ALUMINUM HYDROXIDE, MAGNESIUM HYDROXIDE, AND SIMETHICONE 30 ML: 200; 200; 20 SUSPENSION ORAL at 11:59

## 2021-04-22 RX ADMIN — WARFARIN SODIUM 7.5 MG: 2.5 TABLET ORAL at 17:34

## 2021-04-22 RX ADMIN — GABAPENTIN 100 MG: 100 CAPSULE ORAL at 22:34

## 2021-04-22 RX ADMIN — IOPAMIDOL 270 ML: 755 INJECTION, SOLUTION INTRAVENOUS at 12:00

## 2021-04-22 RX ADMIN — LIDOCAINE HYDROCHLORIDE 3 ML: 10 INJECTION, SOLUTION EPIDURAL; INFILTRATION; INTRACAUDAL; PERINEURAL at 11:14

## 2021-04-22 RX ADMIN — FENTANYL CITRATE 50 MCG: 50 INJECTION, SOLUTION INTRAMUSCULAR; INTRAVENOUS at 11:05

## 2021-04-22 RX ADMIN — HEPARIN SODIUM 2 ML: 10000 INJECTION, SOLUTION INTRAVENOUS; SUBCUTANEOUS at 11:14

## 2021-04-22 RX ADMIN — HEPARIN SODIUM 3 ML/HR: 5000 INJECTION, SOLUTION INTRAVENOUS; SUBCUTANEOUS at 11:14

## 2021-04-22 RX ADMIN — NITROGLYCERIN 0.2 MG: 200 INJECTION, SOLUTION INTRAVENOUS at 11:30

## 2021-04-22 RX ADMIN — Medication 10 ML: at 13:45

## 2021-04-22 RX ADMIN — SODIUM CHLORIDE 75 ML/HR: 900 INJECTION, SOLUTION INTRAVENOUS at 09:55

## 2021-04-22 RX ADMIN — NITROGLYCERIN 0.2 MG: 200 INJECTION, SOLUTION INTRAVENOUS at 11:54

## 2021-04-22 RX ADMIN — CLOPIDOGREL BISULFATE 600 MG: 75 TABLET ORAL at 11:59

## 2021-04-22 RX ADMIN — SODIUM CHLORIDE 75 ML/HR: 900 INJECTION, SOLUTION INTRAVENOUS at 13:44

## 2021-04-22 RX ADMIN — LISINOPRIL 5 MG: 5 TABLET ORAL at 13:30

## 2021-04-22 RX ADMIN — BIVALIRUDIN 1.75 MG/KG/HR: 250 INJECTION, POWDER, LYOPHILIZED, FOR SOLUTION INTRAVENOUS at 11:30

## 2021-04-22 RX ADMIN — MIDAZOLAM 1 MG: 1 INJECTION INTRAMUSCULAR; INTRAVENOUS at 11:05

## 2021-04-22 NOTE — PROGRESS NOTES
Warfarin dosing per pharmacist    Jorge Leighton is a 79 y.o. female. Height: 5' 4\" (162.6 cm)    Weight: 68 kg (150 lb)    Indication:  afib    Goal INR:  2-3    Home dose:  6 mg daily    Risk factors or significant drug interactions:  none    Other anticoagulants:  none    Daily Monitoring  Date  INR     Warfarin dose HGB              Notes  4/22  1.1  7.5 mg  ---        Warfarin has been held the past 3 days in preparation for procedure. Pharmacy consulted to resume warfarin dosing tonight. Outpatient cardiology note on 4/12 states patient is taking 5 mg daily at home, however patient filled a prescription for 6 mg tablets on 3/18. INR 1.1, will give a dose of 7.5 mg today. Per cardiology, stop aspirin when INR >2. Thank you,  Sharona Arenas, Pharm. D.   PGY1 Pharmacy Resident  [phone]

## 2021-04-22 NOTE — PROGRESS NOTES
CM made contact with patient daughter Padma Amado) at bedside. Patient daughter verified demographic no changes needs. Verified PCP and insurance. Daughter states that patient moved into [employer] last week. States that patient is independent with her ADL's and has no DME's in the home. Daughter states that patient has no hx with HH / rehab. Patient has no needs identified at this time. CM will continue to monitor and remain available for any needs or concerns that may occur. Care Management Interventions  PCP Verified by CM: Yes(Goldy Bailon MD)  Mode of Transport at Discharge: Other (see comment)  Transition of Care Consult (CM Consult): Discharge Planning, Other(Independent [facility] )  Discharge Durable Medical Equipment: No  Physical Therapy Consult: No  Occupational Therapy Consult: No  Speech Therapy Consult: No  Current Support Network:  Other  Confirm Follow Up Transport: Family  The Patient and/or Patient Representative was Provided with a Choice of Provider and Agrees with the Discharge Plan?: No  Freedom of Choice List was Provided with Basic Dialogue that Supports the Patient's Individualized Plan of Care/Goals, Treatment Preferences and Shares the Quality Data Associated with the Providers?: No  [employer] Information Provided?: No  Discharge Location  Discharge Placement: [facility]

## 2021-04-22 NOTE — PROGRESS NOTES
TRANSFER - OUT REPORT:    Verbal report given to RN(name) on Shanelle Rosa  being transferred to CPRU(unit) for routine progression of care       Report consisted of patients Situation, Background, Assessment and   Recommendations(SBAR). Information from the following report(s) SBAR was reviewed with the receiving nurse.     [facility] w/ Dr. Billy Drake  1 mg versed  50 mcg fentanyl  1 stent to LAD  IFR the RCA- neg  R radial  TR band 11 mls  600 mg plavix  mylanta  Angiomax bolus and gtt - off at 1205

## 2021-04-22 NOTE — ROUTINE PROCESS
Bedside and Verbal shift change report given to self (oncoming nurse) by Emmanuel Barba (offgoing nurse). Report included the following information SBAR, Kardex, Procedure Summary, Recent Results and Cardiac Rhythm A fib.     R radial site with no bleeding/no hematoma noted. Instructed to call for assist as needed; voices understanding.

## 2021-04-22 NOTE — PROGRESS NOTES
TRANSFER - IN REPORT:    Verbal report received from Familia Robles RN on [employer] being received from [address] [place] [address] for routine progression of care      Report consisted of patients Situation, Background, Assessment and Recommendations(SBAR). Information from the following report(s) Procedure Summary, MAR and Cardiac Rhythm AFib was reviewed with the receiving nurse. Opportunity for questions and clarification was provided. Assessment completed upon patients arrival to unit and care assumed.

## 2021-04-22 NOTE — PROCEDURES
Brief Cardiac Procedure Note    Patient: Yamel Crook MRN: [id number]  SSN: [id number]    YOB: 1940  Age: 79 y.o. Sex: female      Date of Procedure: 4/22/2021     Pre-procedure Diagnosis: Atrial Fibrillation/Atrial Flutter/APICAL ISCHEMIA ON NUC    Post-procedure Diagnosis: Coronary Artery Disease    Reason for Procedure: Suspected CAD    Procedure: Left Heart Catheterization with Percutaneous Coronary Intervention    Brief Description of Procedure: PCI MID TO DISTAL LAD, IFR RCA    Performed By: Quinn Jacobson MD     Assistants: NONE    Anesthesia: Moderate Sedation    Estimated Blood Loss: Less than 10 mL      Specimens: None    Implants: None    Findings:   LV 50% WITH AF, EDP 5-10, NO MR OR AV GRADIENT  LM MILD IRREG  LAD MID 50% CA++ FOLLOWED BY 95% -SMALL CALIBER LAD DIFFUSELY- COVERED BOTH WITH 2.0 X 30 RODY TO 12ATM, 2.5NC TO 20, 20, 12, 6 WITH 2.0NC DISTAL MARGIN OF STENT TO 16, GOOD RESULT  LCX MILD IRREG  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     Signed By: Quinn Jacobson MD     April 22, 2021

## 2021-04-22 NOTE — PROGRESS NOTES
Patient received to [address] [place] [address] room # 12  Ambulatory from [facility]. Patient scheduled for C today with Dr Simon Lance. Procedure reviewed & questions answered, voiced good understanding consent obtained & placed on chart. All medications and medical history reviewed. Will prep patient per orders. Patient & family updated on plan of care. The patient has a fraility score of 3-MANAGING WELL, based on ability to perform adls independently.   Patient took Aspirin 324 today at 0630 prior to arrival.

## 2021-04-22 NOTE — ROUTINE PROCESS
Called [facility] in [place]. They said that the RX is waiting for him to  with a zero co-payment. No PA is needed. No phone number to call patient back to inform him of this.Carolina Ward MA/TC     TRANSFER - IN REPORT: 
 
Verbal report received from Lolly mckinnon RN on Altagracia Shaffer  being received from [address] [place] [address] for routine progression of care. Report consisted of patients Situation, Background, Assessment and  
Recommendations(SBAR). Information from the following reports was reviewed: Kardex, Procedure Summary, MAR and Recent Results. Opportunity for questions and clarification was provided. Assessment completed upon patients arrival to unit and care assumed. Patient received to room 308 and assessment completed. Patient connected to telemetry monitor and eagle with BP cycling every 15 minutes. Patient oriented to room and plan of care reviewed. Patient voiced understanding of keeping wrist immobilized. Right radial site benign, dressing dry and intact, no hematoma; TR band in place. Patient provided with clear liquids. Patient aware to use call light to communicate needs. Instructed patient to not use arm for any pushing or pulling. Admission skin assessment completed with second RN and reveals the following: ***.

## 2021-04-23 VITALS
TEMPERATURE: 98.6 F | BODY MASS INDEX: 25.85 KG/M2 | RESPIRATION RATE: 18 BRPM | DIASTOLIC BLOOD PRESSURE: 76 MMHG | OXYGEN SATURATION: 98 % | HEIGHT: 64 IN | WEIGHT: 151.4 LBS | SYSTOLIC BLOOD PRESSURE: 134 MMHG | HEART RATE: 61 BPM

## 2021-04-23 LAB
ANION GAP SERPL CALC-SCNC: 4 MMOL/L (ref 7–16)
BASOPHILS # BLD: 0 K/UL (ref 0–0.2)
BASOPHILS NFR BLD: 1 % (ref 0–2)
BUN SERPL-MCNC: 17 MG/DL (ref 8–23)
CALCIUM SERPL-MCNC: 8.9 MG/DL (ref 8.3–10.4)
CHLORIDE SERPL-SCNC: 110 MMOL/L (ref 98–107)
CO2 SERPL-SCNC: 29 MMOL/L (ref 21–32)
CREAT SERPL-MCNC: 0.79 MG/DL (ref 0.6–1)
DIFFERENTIAL METHOD BLD: ABNORMAL
EOSINOPHIL # BLD: 0.1 K/UL (ref 0–0.8)
EOSINOPHIL NFR BLD: 1 % (ref 0.5–7.8)
ERYTHROCYTE [DISTWIDTH] IN BLOOD BY AUTOMATED COUNT: 14.7 % (ref 11.9–14.6)
GLUCOSE SERPL-MCNC: 87 MG/DL (ref 65–100)
HCT VFR BLD AUTO: 36.7 % (ref 35.8–46.3)
HGB BLD-MCNC: 11.6 G/DL (ref 11.7–15.4)
IMM GRANULOCYTES # BLD AUTO: 0 K/UL (ref 0–0.5)
IMM GRANULOCYTES NFR BLD AUTO: 0 % (ref 0–5)
INR PPP: 1.1
LYMPHOCYTES # BLD: 1.7 K/UL (ref 0.5–4.6)
LYMPHOCYTES NFR BLD: 21 % (ref 13–44)
MCH RBC QN AUTO: 31.6 PG (ref 26.1–32.9)
MCHC RBC AUTO-ENTMCNC: 31.6 G/DL (ref 31.4–35)
MCV RBC AUTO: 100 FL (ref 79.6–97.8)
MONOCYTES # BLD: 0.5 K/UL (ref 0.1–1.3)
MONOCYTES NFR BLD: 6 % (ref 4–12)
NEUTS SEG # BLD: 6.1 K/UL (ref 1.7–8.2)
NEUTS SEG NFR BLD: 72 % (ref 43–78)
NRBC # BLD: 0 K/UL (ref 0–0.2)
PLATELET # BLD AUTO: 167 K/UL (ref 150–450)
PMV BLD AUTO: 11.8 FL (ref 9.4–12.3)
POTASSIUM SERPL-SCNC: 3.8 MMOL/L (ref 3.5–5.1)
PROTHROMBIN TIME: 14.7 SEC (ref 12.5–14.7)
RBC # BLD AUTO: 3.67 M/UL (ref 4.05–5.2)
SODIUM SERPL-SCNC: 143 MMOL/L (ref 136–145)
WBC # BLD AUTO: 8.4 K/UL (ref 4.3–11.1)

## 2021-04-23 PROCEDURE — 99217 PR OBSERVATION CARE DISCHARGE MANAGEMENT: CPT | Performed by: INTERNAL MEDICINE

## 2021-04-23 PROCEDURE — 85025 COMPLETE CBC W/AUTO DIFF WBC: CPT

## 2021-04-23 PROCEDURE — 85610 PROTHROMBIN TIME: CPT

## 2021-04-23 PROCEDURE — 99218 HC RM OBSERVATION: CPT

## 2021-04-23 PROCEDURE — 74011250637 HC RX REV CODE- 250/637: Performed by: NURSE PRACTITIONER

## 2021-04-23 PROCEDURE — 80048 BASIC METABOLIC PNL TOTAL CA: CPT

## 2021-04-23 PROCEDURE — 74011250637 HC RX REV CODE- 250/637: Performed by: INTERNAL MEDICINE

## 2021-04-23 PROCEDURE — 36415 COLL VENOUS BLD VENIPUNCTURE: CPT

## 2021-04-23 RX ORDER — NITROGLYCERIN 0.4 MG/1
0.4 TABLET SUBLINGUAL
Qty: 25 TAB | Refills: 11 | Status: SHIPPED | OUTPATIENT
Start: 2021-04-23

## 2021-04-23 RX ORDER — LISINOPRIL 10 MG/1
10 TABLET ORAL DAILY
Qty: 30 TAB | Refills: 6 | Status: ON HOLD | OUTPATIENT
Start: 2021-04-23 | End: 2021-07-30 | Stop reason: SDUPTHER

## 2021-04-23 RX ORDER — ATORVASTATIN CALCIUM 40 MG/1
40 TABLET, FILM COATED ORAL
Status: DISCONTINUED | OUTPATIENT
Start: 2021-04-23 | End: 2021-04-23 | Stop reason: HOSPADM

## 2021-04-23 RX ORDER — CLOPIDOGREL BISULFATE 75 MG/1
75 TABLET ORAL DAILY
Qty: 30 TAB | Refills: 11 | Status: SHIPPED | OUTPATIENT
Start: 2021-04-23 | End: 2022-03-16 | Stop reason: ALTCHOICE

## 2021-04-23 RX ORDER — ATORVASTATIN CALCIUM 40 MG/1
40 TABLET, FILM COATED ORAL
Qty: 30 TAB | Refills: 11 | Status: SHIPPED | OUTPATIENT
Start: 2021-04-23 | End: 2022-03-21 | Stop reason: CLARIF

## 2021-04-23 RX ORDER — GUAIFENESIN 100 MG/5ML
81 LIQUID (ML) ORAL DAILY
Status: SHIPPED | COMMUNITY
Start: 2021-04-23 | End: 2021-05-17 | Stop reason: ALTCHOICE

## 2021-04-23 RX ADMIN — CLOPIDOGREL BISULFATE 75 MG: 75 TABLET ORAL at 09:11

## 2021-04-23 RX ADMIN — ASPIRIN 81 MG: 81 TABLET, CHEWABLE ORAL at 09:11

## 2021-04-23 RX ADMIN — FUROSEMIDE 20 MG: 40 TABLET ORAL at 09:10

## 2021-04-23 RX ADMIN — ATORVASTATIN CALCIUM 40 MG: 40 TABLET, FILM COATED ORAL at 01:01

## 2021-04-23 RX ADMIN — METOPROLOL SUCCINATE 25 MG: 50 TABLET, EXTENDED RELEASE ORAL at 09:10

## 2021-04-23 RX ADMIN — LISINOPRIL 5 MG: 5 TABLET ORAL at 09:09

## 2021-04-23 NOTE — DISCHARGE SUMMARY
[address] [place] [address] 121 Cardiology Discharge Summary     Patient ID:  Clark Lopez  [id number]  79 y.o.  1940    Admit date: 4/22/2021    Discharge date:  4/23/2021    Admitting Physician: Kely Rosario MD     Discharge Physician: Dr. Michelle Encarnacion    Admission Diagnoses: Atrial fibrillation [facility]) [I48.91]  Abnormal nuclear cardiac imaging test [R93.1]    Discharge Diagnoses:    Diagnosis    Abnormal nuclear cardiac imaging test    Longstanding persistent atrial fibrillation (HCC)    Suspected sleep apnea    Dyspnea on exertion    Chronic cough    RLS (restless legs syndrome)       Cardiology Procedures this admission:  Left heart catheterization with PCI  Consults: None    Hospital Course: Patient was seen at the office of [address] [place] [address] 121 Cardiology by Dr. Simon Lance with recent diagnosis of atrial fibrillation with a screening nuclear stress test due to multiple cardiac risk factors demonstrating distal, anterior and apical ischemia. She was scheduled for an AM Admission LH at [facility] on 4/22/21. Patient underwent cardiac catheterization by Dr. Simon Lance. Patient was found to have 95% stenosis of the mid-distal LAD that was stented with a 2.0 x 30 mm Resolute [place] LAURENCE with 0% residual stenosis. Patient tolerated the procedure well and was taken to the telemetry floor for recovery. The following morning, she was up feeling well without any complaints of chest pain or shortness of breath. Patient's right radial cath site was clean, dry and intact without hematoma or bruit. Patient's labs were WNL. Patient was seen and examined by Dr. Michelle Encarnacion and determined stable and ready for discharge. Patient was instructed on the importance of medication compliance including taking aspirin, Plavix and coumadin everyday without missing a dose. Patient will continue triple therapy until INR> 2, then ASA will be stopped. For maximized medical therapy for CAD, patient will continue BB, ACE-I and statin.   Lisinopril was increased from 5 mg daily to 10 mg daily due to elevated BP while admitted. The patient will follow up with [facility]iology -- Dr. Alejandro Romero on May 17th at [address] [place] [address]. Patient has been referred to cardiac rehab. DISPOSITION: The patient is being discharged home in stable condition on a low saturated fat, low cholesterol and low salt diet. The patient is instructed to advance activities as tolerated to the limit of fatigue or shortness of breath. The patient is instructed to avoid all heavy lifting for 3-5 days. The patient is instructed to watch the cath site for bleeding/oozing; if seen, the patient is instructed to apply firm pressure with a clean cloth and call [facility]iology at [phone]. The patient is instructed to watch for signs of infection which include: increasing area of redness, fever/hot to touch or purulent drainage at the catheterization site. The patient is instructed not to soak in a bathtub for 7-10 days, but is cleared to shower. The patient is instructed to call the office or return to the ER for immediate evaluation for any shortness of breath or chest pain not relieved by NTG. Discharge Exam: Patient has been seen by Dr. Tee Place: see his progress note for exam details.     Visit Vitals  /63 (BP 1 Location: Left arm, BP Patient Position: At rest)   Pulse 79   Temp 98.1 °F (36.7 °C)   Resp 18   Ht 5' 4\" (1.626 m)   Wt 68 kg (150 lb)   SpO2 97%   BMI 25.75 kg/m²       Recent Results (from the past 24 hour(s))   PROTHROMBIN TIME + INR    Collection Time: 04/22/21  9:36 AM   Result Value Ref Range    Prothrombin time 14.3 12.5 - 14.7 sec    INR 1.1     EKG, 12 LEAD, INITIAL    Collection Time: 04/22/21 10:14 AM   Result Value Ref Range    Ventricular Rate 69 BPM    Atrial Rate 68 BPM    QRS Duration 98 ms    Q-T Interval 412 ms    QTC Calculation (Bezet) 441 ms    Calculated R Axis -43 degrees    Calculated T Axis 1 degrees    Diagnosis       Atrial fibrillation with a competing junctional pacemaker  Left axis deviation  Abnormal ECG  No previous ECGs available  Confirmed by Zabrina Irwin MD (), NEREIDA HODGSON ([id number]) on 4/22/2021 3:43:10 PM     POC ACTIVATED CLOTTING TIME    Collection Time: 04/22/21 11:32 AM   Result Value Ref Range    Activated Clotting Time (POC) 401 (H) 70 - 128 SECS   EKG, 12 LEAD, INITIAL    Collection Time: 04/22/21 12:32 PM   Result Value Ref Range    Ventricular Rate 69 BPM    Atrial Rate 326 BPM    QRS Duration 84 ms    Q-T Interval 386 ms    QTC Calculation (Bezet) 413 ms    Calculated R Axis -12 degrees    Calculated T Axis -6 degrees    Diagnosis       Atrial fibrillation with premature ventricular or aberrantly conducted   complexes  Low voltage QRS  Nonspecific ST abnormality  Abnormal ECG  When compared with ECG of 22-APR-2021 10:14,  QRS voltage has decreased  Confirmed by Zabrina Irwin MD (), NEREIDA HODGSON ([id number]) on 4/22/2021 3:47:39 PM           Patient Instructions:   Current Discharge Medication List      START taking these medications    Details   atorvastatin (LIPITOR) 40 mg tablet Take 1 Tab by mouth nightly. Qty: 30 Tab, Refills: 11      aspirin 81 mg chewable tablet Take 1 Tab by mouth daily. Qty:        clopidogreL (PLAVIX) 75 mg tab Take 1 Tab by mouth daily. Qty: 30 Tab, Refills: 11      nitroglycerin (NITROSTAT) 0.4 mg SL tablet 1 Tab by SubLINGual route every five (5) minutes as needed for Chest Pain. Up to 3 doses. Qty: 25 Tab, Refills: 11         CONTINUE these medications which have CHANGED    Details   lisinopriL (PRINIVIL, ZESTRIL) 10 mg tablet Take 1 Tab by mouth daily. Qty: 30 Tab, Refills: 6         CONTINUE these medications which have NOT CHANGED    Details   gabapentin (NEURONTIN) 100 mg capsule Take 100 mg by mouth daily. furosemide (LASIX) 20 mg tablet Take 20 mg by mouth daily. metoprolol succinate (Toprol XL) 25 mg XL tablet Take 1 Tab by mouth daily.   Qty: 90 Tab, Refills: 3      umeclidinium-vilanteroL (Anoro Ellipta) 62.5-25 mcg/actuation inhaler Take 1 Puff by inhalation daily. Qty: 1 Inhaler, Refills: 11      acetaminophen (TylenoL) 325 mg tablet Take  by mouth every four (4) hours as needed for Pain.      warfarin (COUMADIN) 5 mg tablet Take 5 mg by mouth daily. oxyCODONE-acetaminophen (PERCOCET) 5-325 mg per tablet Take 2.5 Tabs by mouth every four (4) hours as needed for Pain.          STOP taking these medications       ibuprofen (AdviL) 200 mg tablet Comments:   Reason for Stopping: increased risk of bleeding on antiplatelet and anticoagulant            Dr. Aden/Fariba Juarez PARonelC

## 2021-04-23 NOTE — ROUTINE PROCESS
Discharge instructions reviewed with pt and daughter. Prescriptions given for new meds and med info sheets provided for all new medications. Opportunity for questions provided. pt voiced understanding of all discharge instructions.    Iv and monitor removed

## 2021-04-23 NOTE — DISCHARGE INSTRUCTIONS
Percutaneous Coronary Intervention: What to Expect at [facility]  Percutaneous coronary intervention (PCI) is the name for procedures that are used to open a blocked coronary artery. The two most common PCI procedures are coronary angioplasty and coronary stent placement. Your groin or arm may have a bruise and feel sore for a day or two after a percutaneous coronary intervention (PCI). You can do light activities around the house, but nothing strenuous for several days. This care sheet gives you a general idea about how long it will take for you to recover. But each person recovers at a different pace. Follow the steps below to get better as quickly as possible. How can you care for yourself at home? Activity  · Do not do strenuous exercise and do not lift anything heavy until your doctor says it is okay. You can walk around the house and do light activity, such as cooking. · For 7-10 days after you go home, do not take baths. Showers are okay. · Try not to walk up stairs for the first couple of days (if the catheter was placed in the artery in your groin). · Go back to regular exercise after seen by cardiologist. Exercise is good for your heart. Get at least 30 minutes of physical activity on most days of the week. Walking is a good choice. If your doctor says it is okay, you also may want to do other activities, such as running, swimming, cycling, or playing tennis. Diet  · Drink plenty of fluids to help your body flush out the dye. If you have kidney, heart, or liver disease and have to limit fluids, talk with your doctor before you increase the amount of fluids you drink. · Keep eating a heart-healthy, low-fat diet that has lots of fruits, vegetables, and whole grains. If you have not been eating this way, talk to your doctor. You also may want to talk to a dietitian. This expert can help you to learn about healthy foods and plan meals.   Medicines  · Your doctor may have prescribed a blood-thinning medicine like aspirin and/or Plavix. It is very important that you take these medicines daily exactly as directed in order to keep the coronary artery open and reduce your risk of a heart attack. · Call your doctor if you think you are having a problem with your medicine. Care of the catheter site  · For 1 or 2 days, you may keep a bandage over the spot where the catheter was inserted if needed. Most often, the bandage may be removed at discharge. · Put ice or a cold pack on the area for 10 to 15 minutes at a time to help with soreness or swelling. Put a thin cloth between the ice and your skin. Follow-up care is a key part of your treatment and safety. Be sure to make and go to all appointments, and call your doctor if you are having problems. Its also a good idea to know your test results. Keep an updated list of your medicines to show all medical providers. When should you call for help? Call 911 anytime you think you may need emergency care. For example, call if:  · You pass out (lose consciousness). · You have severe trouble breathing. · You have sudden chest pain and shortness of breath, or you cough up blood. · You have chest pain or pressure. This may occur with:  ¨ Sweating. ¨ Shortness of breath. ¨ Nausea or vomiting. ¨ Pain that spreads from the chest to the neck, jaw, or one or both shoulders or arms. ¨ Dizziness or lightheadedness. ¨ A fast or uneven pulse. After calling 911, chew 1 adult aspirin. Wait for an ambulance. Do not try to drive yourself. · You have been diagnosed with angina, and you have chest pain that does not go away with rest or is not getting better within 5 minutes after you take a dose of nitroglycerin. Call your doctor now or seek immediate medical care if:  · You are bleeding from the area where the catheter was put in your artery. · You have signs of infection, such as:  ¨ Increased pain, swelling, warmth, or redness.   ¨ Red streaks leading from the catheter site. ¨ Pus draining from the catheter site. ¨ Swollen lymph nodes in your neck, armpits, or groin. ¨ A fever. · Your leg or arm looks blue or feels cold, numb, or tingly. Watch closely for changes in your health, and be sure to contact your doctor if you have any problems. Where can you learn more? Go to [employer]  Enter B503 in the search box to learn more about \"Percutaneous Coronary Intervention: What to Expect at Home\". This care instruction is for use with your licensed healthcare professional. If you have questions about a medical condition or this instruction, always ask your healthcare professional. Care instructions adapted by [employer] (which disclaims liability or warranty for this information) from [facility], [address] [place] [address] 2008. [employer] disclaims any warranty or liability for your use of this information. Patient Education   Patient Education   Patient Education   Patient Education      Nitroglycerin, Rapid Release (NitroMist, Nitrolingual, Nitroquick, Nitrostat) - (By mouth)   Why this medicine is used:   Treats or prevents angina (chest pain). Contact a nurse or doctor right away if you have:  · Throbbing, severe, or ongoing headache, confusion, vision problems  · Trouble breathing, cold sweat, blue skin, lips, or nails  · Severe or ongoing dizziness, lightheadedness, fainting  · Low fever     Common side effects:  · Dizziness, lightheadedness  · Headache  · Feeling of warmth, redness of the face, neck, arms, and upper chest  © 2017 [facility] Information is for End User's use only and may not be sold, redistributed or otherwise used for commercial purposes. Clopidogrel (Plavix) - (By mouth)   Why this medicine is used:   Helps prevent stroke, heart attack, and other heart problems.   Contact a nurse or doctor right away if you have:  Sudden or severe headache  Bloody vomit or vomit that looks like coffee grounds; bloody or black, tarry stools  Bleeding that does not stop or bruises that do not heal  Dark urine or pale stools, nausea, loss of appetite, stomach pain,  Yellow skin or eyes     Common side effects:  Minor bleeding or bruising  © 2017 [employer] Information is for End User's use only and may not be sold, redistributed or otherwise used for commercial purposes. Atorvastatin (Lipitor) - (By mouth)   Why this medicine is used:   Treats high cholesterol and triglyceride levels. Reduces the risk of angina, stroke, heart attack, or certain heart and blood vessel problems. Contact a nurse or doctor right away if you have:  · Severe headache, confusion, trouble speaking  · Dark urine or pale stools  · Yellow skin or eyes  · Nausea, vomiting, loss of appetite, stomach pain  · Muscle pain, tenderness, or weakness; unusual tiredness     Common side effects:  · Diarrhea  · Joint pain  © 2017 [employer] [place] [employer] Information is for End User's use only and may not be sold, redistributed or otherwise used for commercial purposes. Aspirin (Dragan Extra Strength, Dragan Aspirin Children's, Bufferin, Bufferin Low Dose) - (By mouth)   Why this medicine is used:   Treats pain, fever, and inflammation. May also reduce the risk of heart attack. Contact a nurse or doctor right away if you have:  · Bloody vomit or vomit that looks like coffee grounds  · Blood in urine or bloody or black, tarry stools  · Wheezing or trouble breathing     Common side effects:  · Upset stomach  © 2017 [employer] [place] [employer] Information is for End User's use only and may not be sold, redistributed or otherwise used for commercial purposes.

## 2021-04-23 NOTE — ROUTINE PROCESS
Verbal bedside report given to oncoming RN, Kely Ryan. Patient's situation, background, assessment and recommendations provided. Opportunity for questions provided. Oncoming RN assumed care of patient.

## 2021-04-23 NOTE — PROGRESS NOTES
Patient is up for discharge today. Patient has a follow up appointment on May 17th with [facility] Card[facility]. RN confirmed no HH needed for patient. No needs for CM noted at this time. Care Management Interventions  PCP Verified by CM: Yes(Ewa Bailon MD)  Mode of Transport at Discharge: Other (see comment)  Transition of Care Consult (CM Consult): Discharge Planning, Other([facility] )  Discharge Durable Medical Equipment: No  Physical Therapy Consult: No  Occupational Therapy Consult: No  Speech Therapy Consult: No  Current Support Network:  Other  Confirm Follow Up Transport: Family  The Patient and/or Patient Representative was Provided with a Choice of Provider and Agrees with the Discharge Plan?: No  Freedom of Choice List was Provided with Basic Dialogue that Supports the Patient's Individualized Plan of Care/Goals, Treatment Preferences and Shares the Quality Data Associated with the Providers?: No  [employer] Information Provided?: No  Discharge Location  Discharge Placement: [facility]

## 2021-04-23 NOTE — ROUTINE PROCESS
Cardiac Rehab: Spoke with patient regarding referral to cardiac rehab. Patient meets admission criteria based on PCI (4/22/21). Written information about Cardiac Rehab given and reviewed with patient. Discussed lifestyle modifications to promote cardiac wellness. Patient indicated that she does not want to participate in the cardiac rehab program due to time and travel requirements. She states she does not drive and would not ask her daughter for transportation. Her Cardiologist is Dr. Jose M Steve.       Thank you,  DAMON BuschN, RN  Cardiopulmonary Rehabilitation Nurse Liaison  [facility]

## 2021-04-23 NOTE — ROUTINE PROCESS
Bedside and verbal report received from Naina Hart PRN Reassess

Patient states that medications somewhat effective, he still feels anxious and asks for 
Valium, no PRN available at this time

## 2021-04-23 NOTE — PROCEDURES
[facility]  CARDIAC CATH    Name:  Jony Joseph  MR#:  [id number]  :  1940  ACCOUNT #:  [de-identified]  DATE OF SERVICE:  2021    REFERRING PHYSICIAN:  Hilario Mehta MD    PRIMARY CARDIOLOGIST:  Abiola Medrano MD    PROCEDURES PERFORMED:  Left heart cath with coronary angiography and left ventriculogram, PTCA and stenting of the mid to distal LAD using a single [place] drug-eluting stent, negative pressure wire interrogation to a moderate severity proximal RCA stenosis. PREOPERATIVE DIAGNOSIS:  New diagnosis of atrial fibrillation with a screening nuclear stress test due to multiple cardiac risk factors demonstrating distal, anterior and apical ischemia. POSTOPERATIVE DIAGNOSIS:  Significant multivessel coronary artery disease. SURGEON:  Abiola Medrano MD    ASSISTANT:  None. ESTIMATED BLOOD LOSS:  Less than 5 mL. SPECIMENS REMOVED:  None. COMPLICATIONS:  None. IMPLANTS:  See below. ANESTHESIA:  The patient was sedated by Gary Madison with 1 mg Versed, 50 mcg fentanyl and monitored from 11:05 to 12:02 p.m. TOTAL CONTRAST:  270 mL of Isovue    TECHNIQUE:  After informed consent was obtained, the patient was brought to the cath lab, prepped and draped in the usual fashion. A 6-Mauritian vascular sheath was placed in the right radial artery via the micropuncture modified Seldinger technique and left heart catheterization performed using 5-Mauritian angled pigtail and Tiger catheters using a Versacore wire to traverse a calcified tortuous right subclavian. Percutaneous intervention was then performed through a 6-Mauritian XB3.0 guiding catheter and a 6-Mauritian JR4 guiding catheter. Angiomax was used for anticoagulation with a therapeutic periprocedural ACT. The patient received aspirin this morning and was loaded with 600 mg Plavix. She will continue dual antiplatelet therapy until her INR is back to 2.0, at which time we will stop aspirin. Manual pressure will be applied to the patient's access site via TR band protocol. There were no complications. PRESSURE RESULTS:  Aorta 140/76. Left ventricle 140/5-10. LEFT VENTRICULOGRAM:  Left ventriculogram in the setting of irregular atrial fibrillation yields an ejection fraction estimate of about 50% with normal regional wall motion but diffuse mild hypokinesis. There is no mitral regurgitation and no aortic valve gradient on catheter pullback. End-diastolic pressure is normal.  The ascending aorta has mild atheromatous disease at the arch but otherwise appears fairly normal.    CORONARY ANATOMY:  The left main has minimal irregularity, dividing into an LAD and circumflex in the usual fashion. The LAD is small caliber diffusely. Proximally, there is mild calcification and mild irregularity, less than 10%. There is a small first diagonal branch which has mild irregularity. A 1.5-mm second diagonal branch has an ostial and proximal tandem 80% to 90% lesions but this is too small to intervene upon percutaneously and best left to medical therapy. The continuing LAD has a calcified focal smooth 50% narrowing followed by a critical 90% lesion at the takeoff of a longitudinally running septal . The LAD terminally is tortuous and small caliber but wraps around the apex. This is the source of the abnormal stress test.    Circumflex is a moderate-caliber vessel which has diffuse mild irregularity. The right coronary artery is a large dominant vessel supplying large posterior descending and posterolateral branches. The right coronary artery has an ellipsoid mildly calcific stenosis in the proximal vessel, but otherwise, the entire right coronary artery including the posterior descending and posterolateral branches have at most minimal luminal disease.     The right coronary artery stenosis is ellipsoid and appears to be on the order of 70% in the ALESSANDRA view, but in the GERMAN view appears to be on the order of 30% to 40%. PERCUTANEOUS INTERVENTION REPORT:  After systemic anticoagulation with Angiomax and verification of therapeutic ACT, a Runthrough wire was advanced into the distal and apical LAD and the critical lesion in the mid LAD predilated with a 2-mm compliant balloon up to 16 atmospheres for multiple inflations. We then stented the tandem lesions in the mid to distal LAD overlapping the longitudinally running septal  with a 2.0 x 30-mm [place] drug-eluting stent deployed to 16 atmospheres. We post dilated with a 2-mm noncompliant balloon at the distal margin of the stented region to 16 atmospheres. We then post dilated with a 2.5-mm noncompliant balloon to 12 atmospheres just proximal to the distal margin, 20 atmospheres in the mid, and 20 atmospheres in the proximal region of the stented LAD. There was a nice stent taper with less than 10% residual stenosis, no dissection and FUENTES 3 flow. The jailed septal had a tight 90% ostial napkin ring narrowing due to plaque shift, but this is a noncritical vessel and short, best left to medical therapy. We then performed pressure wire interrogation to the ellipsoid at least moderate severity stenosis in the RCA. With the pressure wire directed into the distal RCA, the iFR was negative at 0.96-0.97 on two occasions. There was no drift. This will be left to medical therapy. CONCLUSIONS:  1.  Moderate severity RCA disease with negative pressure wire interrogation. 2.  PTCA and stenting of the mid to distal LAD at the site of abnormality on nuclear stress testing using an [place] drug-eluting stent. 3.  Preserved ejection fraction (low normal) with persistent atrial fib. PLAN:  We will load the patient with Plavix and resume Coumadin this evening. Target INR is 2-3. Once her INR is greater than 2, we will stop aspirin. She will need to maintain Coumadin and Plavix with plans for VAMSI cardioversion in several weeks.       Chepe Benavides Steve Senior MD      [id number]  D:  04/22/2021 12:28  T:  04/22/2021 22:29  JOB #:  [id number]  CC:  MD Nel Suggs MD

## 2021-06-23 ENCOUNTER — HOSPITAL ENCOUNTER (OUTPATIENT)
Dept: GENERAL RADIOLOGY | Age: 81
Discharge: HOME | End: 2021-06-23
Payer: MEDICARE

## 2021-06-23 DIAGNOSIS — J90 PLEURAL EFFUSION: ICD-10-CM

## 2021-06-23 PROCEDURE — 71046 X-RAY EXAM CHEST 2 VIEWS: CPT

## 2021-07-28 ENCOUNTER — HOSPITAL ENCOUNTER (OUTPATIENT)
Dept: LAB | Age: 81
Discharge: HOME | End: 2021-07-28
Payer: MEDICARE

## 2021-07-28 DIAGNOSIS — I48.11 LONGSTANDING PERSISTENT ATRIAL FIBRILLATION (HCC): ICD-10-CM

## 2021-07-28 LAB
ALBUMIN SERPL-MCNC: 3.9 G/DL (ref 3.2–4.6)
ALBUMIN/GLOB SERPL: 1.4 {RATIO} (ref 1.2–3.5)
ALP SERPL-CCNC: 81 U/L (ref 50–136)
ALT SERPL-CCNC: 42 U/L (ref 12–65)
ANION GAP SERPL CALC-SCNC: 0 MMOL/L (ref 7–16)
AST SERPL-CCNC: 19 U/L (ref 15–37)
BASOPHILS # BLD: 0 K/UL (ref 0–0.2)
BASOPHILS NFR BLD: 1 % (ref 0–2)
BILIRUB SERPL-MCNC: 0.6 MG/DL (ref 0.2–1.1)
BUN SERPL-MCNC: 19 MG/DL (ref 8–23)
CALCIUM SERPL-MCNC: 9.1 MG/DL (ref 8.3–10.4)
CHLORIDE SERPL-SCNC: 108 MMOL/L (ref 98–107)
CO2 SERPL-SCNC: 34 MMOL/L (ref 21–32)
CREAT SERPL-MCNC: 0.91 MG/DL (ref 0.6–1)
DIFFERENTIAL METHOD BLD: ABNORMAL
EOSINOPHIL # BLD: 0.1 K/UL (ref 0–0.8)
EOSINOPHIL NFR BLD: 1 % (ref 0.5–7.8)
ERYTHROCYTE [DISTWIDTH] IN BLOOD BY AUTOMATED COUNT: 13.4 % (ref 11.9–14.6)
GLOBULIN SER CALC-MCNC: 2.8 G/DL (ref 2.3–3.5)
GLUCOSE SERPL-MCNC: 83 MG/DL (ref 65–100)
HCT VFR BLD AUTO: 38.6 % (ref 35.8–46.3)
HGB BLD-MCNC: 12.1 G/DL (ref 11.7–15.4)
IMM GRANULOCYTES # BLD AUTO: 0 K/UL (ref 0–0.5)
IMM GRANULOCYTES NFR BLD AUTO: 0 % (ref 0–5)
INR PPP: 2.1
LYMPHOCYTES # BLD: 1.5 K/UL (ref 0.5–4.6)
LYMPHOCYTES NFR BLD: 18 % (ref 13–44)
MAGNESIUM SERPL-MCNC: 2.2 MG/DL (ref 1.8–2.4)
MCH RBC QN AUTO: 32.3 PG (ref 26.1–32.9)
MCHC RBC AUTO-ENTMCNC: 31.3 G/DL (ref 31.4–35)
MCV RBC AUTO: 102.9 FL (ref 79.6–97.8)
MONOCYTES # BLD: 0.5 K/UL (ref 0.1–1.3)
MONOCYTES NFR BLD: 6 % (ref 4–12)
NEUTS SEG # BLD: 6.3 K/UL (ref 1.7–8.2)
NEUTS SEG NFR BLD: 75 % (ref 43–78)
NRBC # BLD: 0 K/UL (ref 0–0.2)
PLATELET # BLD AUTO: 215 K/UL (ref 150–450)
PMV BLD AUTO: 10.2 FL (ref 9.4–12.3)
POTASSIUM SERPL-SCNC: 3.8 MMOL/L (ref 3.5–5.1)
PROT SERPL-MCNC: 6.7 G/DL (ref 6.3–8.2)
PROTHROMBIN TIME: 23.3 SEC (ref 12.5–14.7)
RBC # BLD AUTO: 3.75 M/UL (ref 4.05–5.2)
SODIUM SERPL-SCNC: 142 MMOL/L (ref 136–145)
WBC # BLD AUTO: 8.4 K/UL (ref 4.3–11.1)

## 2021-07-28 PROCEDURE — 80053 COMPREHEN METABOLIC PANEL: CPT

## 2021-07-28 PROCEDURE — 83735 ASSAY OF MAGNESIUM: CPT

## 2021-07-28 PROCEDURE — 36415 COLL VENOUS BLD VENIPUNCTURE: CPT

## 2021-07-28 PROCEDURE — 85610 PROTHROMBIN TIME: CPT

## 2021-07-28 PROCEDURE — 85025 COMPLETE CBC W/AUTO DIFF WBC: CPT

## 2021-07-30 ENCOUNTER — HOSPITAL ENCOUNTER (OUTPATIENT)
Dept: CARDIAC CATH/INVASIVE PROCEDURES | Age: 81
Discharge: HOME | End: 2021-07-30
Attending: INTERNAL MEDICINE | Admitting: INTERNAL MEDICINE
Payer: MEDICARE

## 2021-07-30 VITALS
DIASTOLIC BLOOD PRESSURE: 79 MMHG | BODY MASS INDEX: 26.63 KG/M2 | HEART RATE: 53 BPM | OXYGEN SATURATION: 96 % | HEIGHT: 64 IN | WEIGHT: 156 LBS | SYSTOLIC BLOOD PRESSURE: 162 MMHG | RESPIRATION RATE: 18 BRPM

## 2021-07-30 DIAGNOSIS — I48.11 LONGSTANDING PERSISTENT ATRIAL FIBRILLATION (HCC): ICD-10-CM

## 2021-07-30 LAB
INR PPP: 2
PROTHROMBIN TIME: 22.7 SEC (ref 12.5–14.7)

## 2021-07-30 PROCEDURE — 93312 ECHO TRANSESOPHAGEAL: CPT

## 2021-07-30 PROCEDURE — 92960 CARDIOVERSION ELECTRIC EXT: CPT | Performed by: INTERNAL MEDICINE

## 2021-07-30 PROCEDURE — 93325 DOPPLER ECHO COLOR FLOW MAPG: CPT | Performed by: INTERNAL MEDICINE

## 2021-07-30 PROCEDURE — 99152 MOD SED SAME PHYS/QHP 5/>YRS: CPT | Performed by: INTERNAL MEDICINE

## 2021-07-30 PROCEDURE — 74011250636 HC RX REV CODE- 250/636: Performed by: INTERNAL MEDICINE

## 2021-07-30 PROCEDURE — 93312 ECHO TRANSESOPHAGEAL: CPT | Performed by: INTERNAL MEDICINE

## 2021-07-30 PROCEDURE — 85610 PROTHROMBIN TIME: CPT

## 2021-07-30 PROCEDURE — 93320 DOPPLER ECHO COMPLETE: CPT | Performed by: INTERNAL MEDICINE

## 2021-07-30 PROCEDURE — 76377 3D RENDER W/INTRP POSTPROCES: CPT | Performed by: INTERNAL MEDICINE

## 2021-07-30 PROCEDURE — 99153 MOD SED SAME PHYS/QHP EA: CPT | Performed by: INTERNAL MEDICINE

## 2021-07-30 RX ORDER — FENTANYL CITRATE 50 UG/ML
25-100 INJECTION, SOLUTION INTRAMUSCULAR; INTRAVENOUS
Status: DISCONTINUED | OUTPATIENT
Start: 2021-07-30 | End: 2021-07-30 | Stop reason: HOSPADM

## 2021-07-30 RX ORDER — SODIUM CHLORIDE 9 MG/ML
75 INJECTION, SOLUTION INTRAVENOUS CONTINUOUS
Status: DISCONTINUED | OUTPATIENT
Start: 2021-07-30 | End: 2021-07-30 | Stop reason: HOSPADM

## 2021-07-30 RX ORDER — LISINOPRIL 20 MG/1
20 TABLET ORAL DAILY
Qty: 90 TABLET | Refills: 3 | Status: SHIPPED | OUTPATIENT
Start: 2021-07-30 | End: 2021-08-02

## 2021-07-30 RX ORDER — MIDAZOLAM HYDROCHLORIDE 1 MG/ML
.5-2 INJECTION, SOLUTION INTRAMUSCULAR; INTRAVENOUS
Status: DISCONTINUED | OUTPATIENT
Start: 2021-07-30 | End: 2021-07-30 | Stop reason: HOSPADM

## 2021-07-30 RX ADMIN — MIDAZOLAM 1 MG: 1 INJECTION INTRAMUSCULAR; INTRAVENOUS at 10:49

## 2021-07-30 RX ADMIN — MIDAZOLAM 1 MG: 1 INJECTION INTRAMUSCULAR; INTRAVENOUS at 10:42

## 2021-07-30 RX ADMIN — FENTANYL CITRATE 25 MCG: 50 INJECTION, SOLUTION INTRAMUSCULAR; INTRAVENOUS at 10:40

## 2021-07-30 RX ADMIN — FENTANYL CITRATE 25 MCG: 50 INJECTION, SOLUTION INTRAMUSCULAR; INTRAVENOUS at 10:49

## 2021-07-30 RX ADMIN — FENTANYL CITRATE 25 MCG: 50 INJECTION, SOLUTION INTRAMUSCULAR; INTRAVENOUS at 10:42

## 2021-07-30 RX ADMIN — MIDAZOLAM 1 MG: 1 INJECTION INTRAMUSCULAR; INTRAVENOUS at 10:40

## 2021-07-30 NOTE — DISCHARGE INSTRUCTIONS
TAKE 1 AND A HALF PILLS OF COUMADIN TODAY ONLY, THEN RETURN TO NORMAL DOSING        AFTER YOU TRANSESOPHAGEAL ECHOCARDIOGRAM    Be sure someone else drives you home. You may feel drowsy for several hours. Do not eat or drink for at least two hours after your procedure. Your throat will be numb and there is a risk you might have difficulty swallowing for a while. Be careful when you do eat or drink for the first time especially with hot fluids since you could easily burn your throat. Call your doctor if:    · You are bleeding from your throat or mouth. · You have trouble breathing all of a sudden. · You have chest pain or any pain that spreads to your neck, jaw, or arms. · You have questions or concerns. · You have a fever greater than 101°F.    Doctor: Juan Jose Borrego    Special Instructions:    No driving for 24 hours. Nothing to eat or drink until 2pm.      Electrical Cardioversion: What to Expect at [address] [place] [address]     Electrical cardioversion is a treatment for an abnormal heartbeat, such as atrial fibrillation, supraventricular tachycardia, or ventricular tachycardia (VT). Your doctor used a brief electrical shock to reset your heart's rhythm. After the procedure, you may have redness, like a sunburn, where the patches were. The medicines you got to make you sleepy may make you feel drowsy for the rest of the day. Your doctor may have you take medicines to help the heart beat normally and to prevent blood clots. This care sheet gives you a general idea about how long it will take for you to recover. But each person recovers at a different pace. Follow the steps below to feel better as quickly as possible. How can you care for yourself at home? Medicines    · Be safe with medicines. Take your medicines exactly as prescribed. Call your doctor if you think you are having a problem with your medicine. You may take one or more of the following medicines:  ? Rate-control medicines to slow the heart rate. These include beta-blockers, calcium channel blockers, and digoxin. ? Rhythm control medicines that help the heart keep a normal rhythm. ? Blood thinners, also called anticoagulants, which help prevent blood clots. You will get more details on the specific medicines your doctor prescribes. Be sure you know how to take your medicines safely.     · Do not take any vitamins, over-the-counter medicines, or herbal products without talking to your doctor first.   Exercise    · Start light exercise if your doctor says that it's okay. Even a small amount will help you get stronger, have more energy, and manage your stress. Walking is an easy way to get exercise. Start out by walking a little more than you did in the hospital. Bit by bit, increase the amount you walk.     · When you exercise, watch for signs that your heart is working too hard. You are pushing too hard if you cannot talk while you are exercising. If you become short of breath or dizzy or have chest pain, sit down and rest right away.     · Check your pulse regularly. Place two fingers on the artery at the palm side of your wrist in line with your thumb. If your heartbeat seems uneven or fast, talk to your doctor. Other instructions    · Ask your doctor when you can drive again.     · Do not smoke. If you need help quitting, talk to your doctor about stop-smoking programs and medicines. These can increase your chances of quitting for good.     · Limit alcohol. Follow-up care is a key part of your treatment and safety. Be sure to make and go to all appointments, and call your doctor if you are having problems. It's also a good idea to know your test results and keep a list of the medicines you take. When should you call for help? Call 911 anytime you think you may need emergency care. For example, call if:    · You passed out (lost consciousness).     · You have chest pain or pressure. This may occur with:  ? Sweating. ?  Shortness of breath. ? Nausea or vomiting. ? Pain that spreads from the chest to the neck, jaw, or one or both shoulders or arms. ? A fast or uneven pulse. After calling 911, the  may tell you to chew 1 adult-strength or 2 to 4 low-dose aspirin. Wait for an ambulance. Do not try to drive yourself.     · You have symptoms of a stroke. These may include:  ? Sudden numbness, tingling, weakness, or loss of movement in your face, arm, or leg, especially on only one side of your body. ? Sudden vision changes. ? Sudden trouble speaking. ? Sudden confusion or trouble understanding simple statements. ? Sudden problems with walking or balance. ? A sudden, severe headache that is different from past headaches. Call your doctor now or seek immediate medical care if:    · You feel dizzy or lightheaded, or you feel like you may faint.     · You have a fast or irregular heartbeat. Watch closely for any changes in your health, and be sure to contact your doctor if you have any problems. Where can you learn more? Go to http://www.gray.com/  Enter A617 in the search box to learn more about \"Electrical Cardioversion: What to Expect at Home. \"  Current as of: August 31, 2020               Content Version: 12.8  © 3591-4905 [employer]. Care instructions adapted under license by [employer] (which disclaims liability or warranty for this information). If you have questions about a medical condition or this instruction, always ask your healthcare professional. [place] [address] any warranty or liability for your use of this information. Sedation for a Medical Procedure: Care Instructions     You were given a sedative medication during your visit. While many of the effects will have worn   off before you leave; you may continue to feel some effects for several hours. Common side effects from sedation include:  · Feeling sleepy.  (Your doctors and nurses will make sure you are not too sleepy to go home.)  · Nausea and vomiting. This usually does not last long. · Feeling tired. How can you care for yourself at home? Activity    · Don't do anything for 24 hours that requires attention to detail. It takes time for the medicine effects to completely wear off. · Do not make important legal decisions for 24 hours. · Do not sign any legal documents for 24 hours. · Do not drink alcohol today     · For your safety, you should not drive or operate heavy machinery for the remainder of the day     · Rest when you feel tired. Getting enough sleep will help you recover. Diet    · You can eat your normal diet, unless your doctor gives you other instructions. If your stomach is upset, try clear liquids and bland, low-fat foods like plain toast or rice. · Drink plenty of fluids (unless your doctor tells you not to). · Don't drink alcohol for 24 hours. Medicines    · Be safe with medicines. Read and follow all instructions on the label. · If the doctor gave you a prescription medicine for pain, take it as prescribed. · If you are not taking a prescription pain medicine, ask your doctor if you can take an over-the-counter medicine. · If you think your pain medicine is making you sick to your stomach:  · Take your medicine after meals (unless your doctor has told you not to). · Ask your doctor for a different pain medicine. I have read the above instructions and have had the opportunity to ask questions.       Patient: ________________________   Date: _____________    Witness: _______________________   Date: _____________

## 2021-07-30 NOTE — PROGRESS NOTES
TRANSFER - IN REPORT:    Verbal report received from RN(name) on [facility]  being received from cpru(unit) for ordered procedure      Report consisted of patients Situation, Background, Assessment and   Recommendations(SBAR). Information from the following report(s) SBAR was reviewed with the receiving nurse. Opportunity for questions and clarification was provided. Assessment completed upon patients arrival to unit and care assumed.

## 2021-07-30 NOTE — Clinical Note
TRANSFER - OUT REPORT:     Verbal report given to: [facility] RN. Report consisted of patient's Situation, Background, Assessment and   Recommendations(SBAR). Opportunity for questions and clarification was provided. Patient transported with a Registered Nurse. Patient transported to: Chey Taylor.

## 2021-07-30 NOTE — PROGRESS NOTES
TRANSFER - OUT REPORT:    Verbal report given to rn(name) on Hemant Casanova being transferred to cpru(unit) for routine progression of care       Report consisted of patient's Situation, Background, Assessment and   Recommendations(SBAR). Information from the following report(s) SBAR and Procedure Summary was reviewed with the receiving nurse. Opportunity for questions and clarification was provided.

## 2021-07-30 NOTE — PROGRESS NOTES
Patient received to [address] [place] [address] room # 8  Ambulatory from [facility]. Patient scheduled for VAMSI/CVN today with Dr Anupama Taylor. Procedure reviewed & questions answered, voiced good understanding consent obtained & placed on chart. All medications and medical history reviewed. Will prep patient per orders. Patient & family updated on plan of care. The patient has a fraility score of 3-MANAGING WELL, based on patient A&Ox3, patient able to ambulate to room without difficulty.

## 2021-07-30 NOTE — PROGRESS NOTES
TRANSFER - IN REPORT:    Verbal report received from Arabella Linn RN on Zully Flores being received from [address] [place] [address] for routine progression of care      Report consisted of patients Situation, Background, Assessment and Recommendations(SBAR). Information from the following report(s) Procedure Summary was reviewed with the receiving nurse. Opportunity for questions and clarification was provided. Assessment completed upon patients arrival to unit and care assumed.

## 2021-07-30 NOTE — PROGRESS NOTES
TRANSFER - OUT REPORT:    VAMSI/CVN Teo  Numbed approx [phone]  Cardioverted once at [address] into SB  Versed 3 mg  Fentanyl 75 mcg  Pt tolerated well, alert to voice    Verbal report given to liss(name) on Paticia Fresh  being transferred to cpru(unit) for routine progression of care       Report consisted of patients Situation, Background, Assessment and   Recommendations(SBAR). Information from the following report(s) SBAR and Procedure Summary was reviewed with the receiving nurse. Lines:   Peripheral IV 07/30/21 Anterior;Proximal;Right Forearm (Active)        Opportunity for questions and clarification was provided.

## 2021-11-19 ENCOUNTER — HOSPITAL ENCOUNTER (OUTPATIENT)
Dept: LAB | Age: 81
Discharge: HOME | End: 2021-11-19
Payer: MEDICARE

## 2021-11-19 DIAGNOSIS — R53.82 CHRONIC FATIGUE: ICD-10-CM

## 2021-11-19 DIAGNOSIS — I48.11 LONGSTANDING PERSISTENT ATRIAL FIBRILLATION (HCC): ICD-10-CM

## 2021-11-19 PROBLEM — R00.1 BRADYCARDIA: Status: ACTIVE | Noted: 2021-11-19

## 2021-11-19 LAB
ANION GAP SERPL CALC-SCNC: 5 MMOL/L (ref 7–16)
BASOPHILS # BLD: 0.1 K/UL (ref 0–0.2)
BASOPHILS NFR BLD: 1 % (ref 0–2)
BUN SERPL-MCNC: 20 MG/DL (ref 8–23)
CALCIUM SERPL-MCNC: 9 MG/DL (ref 8.3–10.4)
CHLORIDE SERPL-SCNC: 108 MMOL/L (ref 98–107)
CO2 SERPL-SCNC: 29 MMOL/L (ref 21–32)
CREAT SERPL-MCNC: 0.95 MG/DL (ref 0.6–1)
DIFFERENTIAL METHOD BLD: ABNORMAL
EOSINOPHIL # BLD: 0.1 K/UL (ref 0–0.8)
EOSINOPHIL NFR BLD: 1 % (ref 0.5–7.8)
ERYTHROCYTE [DISTWIDTH] IN BLOOD BY AUTOMATED COUNT: 13.3 % (ref 11.9–14.6)
GLUCOSE SERPL-MCNC: 93 MG/DL (ref 65–100)
HCT VFR BLD AUTO: 36 % (ref 35.8–46.3)
HGB BLD-MCNC: 11.5 G/DL (ref 11.7–15.4)
IMM GRANULOCYTES # BLD AUTO: 0 K/UL (ref 0–0.5)
IMM GRANULOCYTES NFR BLD AUTO: 0 % (ref 0–5)
LYMPHOCYTES # BLD: 1.4 K/UL (ref 0.5–4.6)
LYMPHOCYTES NFR BLD: 18 % (ref 13–44)
MAGNESIUM SERPL-MCNC: 2.3 MG/DL (ref 1.8–2.4)
MCH RBC QN AUTO: 33 PG (ref 26.1–32.9)
MCHC RBC AUTO-ENTMCNC: 31.9 G/DL (ref 31.4–35)
MCV RBC AUTO: 103.2 FL (ref 79.6–97.8)
MONOCYTES # BLD: 0.4 K/UL (ref 0.1–1.3)
MONOCYTES NFR BLD: 6 % (ref 4–12)
NEUTS SEG # BLD: 5.6 K/UL (ref 1.7–8.2)
NEUTS SEG NFR BLD: 74 % (ref 43–78)
NRBC # BLD: 0 K/UL (ref 0–0.2)
PLATELET # BLD AUTO: 226 K/UL (ref 150–450)
PMV BLD AUTO: 10.5 FL (ref 9.4–12.3)
POTASSIUM SERPL-SCNC: 3.8 MMOL/L (ref 3.5–5.1)
RBC # BLD AUTO: 3.49 M/UL (ref 4.05–5.2)
SODIUM SERPL-SCNC: 142 MMOL/L (ref 136–145)
TSH SERPL DL<=0.005 MIU/L-ACNC: 0.72 UIU/ML (ref 0.36–3.74)
WBC # BLD AUTO: 7.5 K/UL (ref 4.3–11.1)

## 2021-11-19 PROCEDURE — 83735 ASSAY OF MAGNESIUM: CPT

## 2021-11-19 PROCEDURE — 85025 COMPLETE CBC W/AUTO DIFF WBC: CPT

## 2021-11-19 PROCEDURE — 80048 BASIC METABOLIC PNL TOTAL CA: CPT

## 2021-11-19 PROCEDURE — 36415 COLL VENOUS BLD VENIPUNCTURE: CPT

## 2021-11-19 PROCEDURE — 84443 ASSAY THYROID STIM HORMONE: CPT

## 2022-03-11 PROBLEM — F11.99 OPIOID USE, UNSPECIFIED WITH UNSPECIFIED OPIOID-INDUCED DISORDER (HCC): Status: ACTIVE | Noted: 2022-03-11

## 2022-03-15 ENCOUNTER — HOSPITAL ENCOUNTER (EMERGENCY)
Age: 82
Discharge: HOME | End: 2022-03-15
Attending: EMERGENCY MEDICINE
Payer: MEDICARE

## 2022-03-15 ENCOUNTER — APPOINTMENT (OUTPATIENT)
Dept: GENERAL RADIOLOGY | Age: 82
End: 2022-03-15
Attending: EMERGENCY MEDICINE
Payer: MEDICARE

## 2022-03-15 VITALS
WEIGHT: 150 LBS | OXYGEN SATURATION: 94 % | SYSTOLIC BLOOD PRESSURE: 218 MMHG | RESPIRATION RATE: 18 BRPM | DIASTOLIC BLOOD PRESSURE: 86 MMHG | HEIGHT: 64 IN | BODY MASS INDEX: 25.61 KG/M2 | HEART RATE: 60 BPM | TEMPERATURE: 99.4 F

## 2022-03-15 DIAGNOSIS — R07.81 RIB PAIN ON RIGHT SIDE: ICD-10-CM
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
## 2022-03-15 DIAGNOSIS — M25.551 BILATERAL HIP PAIN: ICD-10-CM

## 2022-03-15 DIAGNOSIS — M25.552 BILATERAL HIP PAIN: ICD-10-CM

## 2022-03-15 PROCEDURE — 99283 EMERGENCY DEPT VISIT LOW MDM: CPT

## 2022-03-15 PROCEDURE — 73502 X-RAY EXAM HIP UNI 2-3 VIEWS: CPT

## 2022-03-15 PROCEDURE — 71100 X-RAY EXAM RIBS UNI 2 VIEWS: CPT

## 2022-03-15 RX ORDER — METHOCARBAMOL 500 MG/1
500 TABLET, FILM COATED ORAL 3 TIMES DAILY
Qty: 30 TABLET | Refills: 0 | Status: SHIPPED | OUTPATIENT
Start: 2022-03-15 | End: 2022-03-25

## 2022-03-15 NOTE — DISCHARGE INSTRUCTIONS
You were evaluated in the emergency department today for right rib pain and bilateral hip pain after fall last night. On physical exam there is no tenderness to palpation along midline neck thoracic back; therefore no images were obtained  X-rays of your right ribs and bilateral hips are negative for fracture. I very will write you a prescription for some nondrowsy muscle relaxer called Robaxin. Be wary of how this medication may affect you. If it makes you drowsy it will increase your risk of falls. it appears that she has several prescriptions for pain medications; for I am not writing any prescriptions for pain medications at this time. All pain medications are risk for overdose and fall risk as well. Other pain medications as prescribed by her other providers. Do not take either of these medications with alcohol. Follow-up with your primary care provider.   Please follow-up with Dr. Vianey Avila after your MRI tomorrow for long-term pain control    Return to the emergency department if there is bladder or bowel incontinence, saddle anesthesia, increased pain, increased pain with inability to move, decreased sensation or strength in lower or upper extremities, chest pain, shortness of breath, signs and symptoms of stroke as listed in your discharge paperwork

## 2022-03-15 NOTE — ED TRIAGE NOTES
Pt reports a known injury to the back dating back to February; here today with unknown fall with bruising to the left ribs and bilateral hips.

## 2022-03-15 NOTE — ED NOTES
I have reviewed discharge instructions with the patient. The patient verbalized understanding. Patient left ED via Discharge Method: ambulatory to Home with family    Opportunity for questions and clarification provided. Patient given 1 scripts. To continue your aftercare when you leave the hospital, you may receive an automated call from our care team to check in on how you are doing. This is a free service and part of our promise to provide the best care and service to meet your aftercare needs.  If you have questions, or wish to unsubscribe from this service please call [phone]. Thank you for Choosing our [address] [place] [address] Emergency Department.

## 2022-03-15 NOTE — ED PROVIDER NOTES
[de-identified] female presents emergency department with her daughter and son-in-law with chief complaint of right posterior rib pain, bilateral hip pain after she tripped and fell last night. Patient denies striking her head, denies loss of consciousness, denies shortness of breath, abdominal pain, upper or lower extremity weakness, saddle anesthesia, loss of bladder or bowel control,. Patient has several compression fractures in her lumbar spine and she is seeing an orthopedist for this. States that she walks with a walker and has been ambulatory since the fall. Family members agree. Patient states that she takes ibuprofen pain and that seems to help with the pain. Patient has a history of hypertension and takes blood pressure medication. Today she did not take her medication because she was driven to emergency department by her family. The history is provided by the patient and a relative. No  was used. Back Pain  This is a new problem. The current episode started 12 to 24 hours ago. The problem occurs constantly. The problem has not changed since onset. Pertinent negatives include no chest pain, no abdominal pain, no headaches and no shortness of breath. Exacerbated by: Movement. The symptoms are relieved by NSAIDs. Treatments tried: NSAIDs.         Past Medical History:   Diagnosis Date    Arrhythmia     Benign essential HTN     CAD (coronary artery disease)     Macrocytic anemia 2019    Restless legs     Sleep apnea     cpap       Past Surgical History:   Procedure Laterality Date    HX APPENDECTOMY  age 9         Family History:   Problem Relation Age of Onset    Cancer Brother     Alcohol abuse Brother        Social History     Socioeconomic History    Marital status:      Spouse name: Not on file    Number of children: Not on file    Years of education: Not on file    Highest education level: Not on file   Occupational History    Not on file   Tobacco Use    Smoking status: Never Smoker    Smokeless tobacco: Never Used    Tobacco comment: heavy secondhand exposure   Substance and Sexual Activity    Alcohol use: Never    Drug use: Never    Sexual activity: Not on file   Other Topics Concern    Not on file   Social History Narrative    Not on file     Social Determinants of Health     Financial Resource Strain:     Difficulty of Paying Living Expenses: Not on file   Food Insecurity:     Worried About Running Out of Food in the Last Year: Not on file    Anay of Food in the Last Year: Not on file   Transportation Needs:     Lack of Transportation (Medical): Not on file    Lack of Transportation (Non-Medical): Not on file   Physical Activity:     Days of Exercise per Week: Not on file    Minutes of Exercise per Session: Not on file   Stress:     Feeling of Stress : Not on file   Social Connections:     Frequency of Communication with Friends and Family: Not on file    Frequency of Social Gatherings with Friends and Family: Not on file    Attends Sabianism Services: Not on file    Active Member of [address] [place] [address] [employer] or Organizations: Not on file    Attends Club or Organization Meetings: Not on file    Marital Status: Not on file   Intimate Partner Violence:     Fear of Current or Ex-Partner: Not on file    Emotionally Abused: Not on file    Physically Abused: Not on file    Sexually Abused: Not on file   Housing Stability:     Unable to Pay for Housing in the Last Year: Not on file    Number of [place] in the Last Year: Not on file    Unstable Housing in the Last Year: Not on file         ALLERGIES: Patient has no known allergies. Review of Systems   Constitutional: Negative for chills, diaphoresis and fever. Respiratory: Negative for shortness of breath. Cardiovascular: Negative for chest pain. Gastrointestinal: Negative for abdominal pain, diarrhea, nausea and vomiting. Genitourinary: Negative for hematuria.    Musculoskeletal: Positive for back pain. Negative for neck pain and neck stiffness. Lateral hip pain   Skin: Negative for wound. Neurological: Negative for dizziness, weakness, light-headedness, numbness and headaches. All other systems reviewed and are negative. Vitals:    03/15/22 1307   BP: (!) 218/86   Pulse: 60   Resp: 18   Temp: 99.4 °F (37.4 °C)   SpO2: 94%   Weight: 68 kg (150 lb)   Height: 5' 4\" (1.626 m)            Physical Exam  Vitals and nursing note reviewed. Constitutional:       General: She is not in acute distress. Appearance: Normal appearance. She is not ill-appearing, toxic-appearing or diaphoretic. HENT:      Head: Normocephalic and atraumatic. Right Ear: Tympanic membrane, ear canal and external ear normal.      Left Ear: Tympanic membrane, ear canal and external ear normal.      Nose: Nose normal.      Mouth/Throat:      Mouth: Mucous membranes are moist.      Pharynx: Oropharynx is clear. Eyes:      General: No scleral icterus. Extraocular Movements: Extraocular movements intact. Conjunctiva/sclera: Conjunctivae normal.      Pupils: Pupils are equal, round, and reactive to light. Cardiovascular:      Rate and Rhythm: Normal rate. Pulses: Normal pulses. Heart sounds: Normal heart sounds. Pulmonary:      Effort: Pulmonary effort is normal. No respiratory distress. Breath sounds: Normal breath sounds. No stridor. No wheezing, rhonchi or rales. Chest:      Chest wall: No tenderness. Abdominal:      General: Bowel sounds are normal.      Palpations: Abdomen is soft. Tenderness: There is no abdominal tenderness. There is no right CVA tenderness, left CVA tenderness or guarding. Musculoskeletal:         General: Tenderness (Tenderness to palpation right posterior ribs; mild tenderness to palpation lateral aspects of bilateral hips.) present. No swelling or deformity. Normal range of motion. Cervical back: Normal range of motion and neck supple.  No rigidity or tenderness. Right lower leg: No edema. Left lower leg: No edema. Comments: No tenderness to palpation of midline C-spine T-spine. tenderness to palpation L-spine (patient states she has several compression fractions in her lumbar spine and she typically has pain here. Tenderness today feels like her normal.)    noTenderness to palpation of skull    No tenderness to palpation bilateral upper extremities or bilateral lower extremities. Lymphadenopathy:      Cervical: No cervical adenopathy. Skin:     General: Skin is warm and dry. Capillary Refill: Capillary refill takes less than 2 seconds. Coloration: Skin is not jaundiced or pale. Findings: No bruising (Ecchymosis noted on posterior right chest wall pain.), erythema, lesion or rash. Neurological:      General: No focal deficit present. Mental Status: She is alert and oriented to person, place, and time. Cranial Nerves: No cranial nerve deficit. Sensory: No sensory deficit. Motor: No weakness. Coordination: Coordination normal.      Gait: Gait normal.   Psychiatric:         Mood and Affect: Mood normal.         Behavior: Behavior normal.         Thought Content: Thought content normal.         Judgment: Judgment normal.          MDM  Number of Diagnoses or Management Options  Bilateral hip pain  Fall, initial encounter  Rib pain on right side  Diagnosis management comments: -year-old female presents emergency department with her daughter and her son-in-law after a unwitnessed fall last night when patient got up from her bed. States she tripped when she fell. Denies loss of consciousness, denies striking head. Was ambulatory after the fall. Vital signs reviewed, patient stable, NAD    Right-sided rib x-rays, bilateral hip x-rays ordered out of triage. Rib x-rays negative for fracture, pneumothorax.   Lateral hip x-rays negative for fracture, joints and pubic symphysis normal.  Soft tissues unremarkable    Physical exam lungs clear to auscultation bilaterally. No ecchymosis posterior right side chest wall. Tenderness to palpation posterior right chest wall. Tenderness to palpation lateral aspect bilateral hips. Range of motion normal  Neuro exam unremarkable  Bilateral upper and lower extremity sensation intact, bilateral upper extremity and lower extremity motor strength 5+  No tenderness to palpation midline C-spine, midline T-spine,  no tenderness to palpation of skull  Patient had some tenderness to palpation midline lumbar spine. Patient states this tenderness to palpation is normal for her as she has several compression fractures her lumbar spine she is having an MRI tomorrow for. Denies any saddle anesthesia, any bladder or bowel incontinence. On history, physical exam I do not feel any additional imaging or any lab work is warranted at this time. Neville physical exam findings radiographic findings and treatment plan with patient and her daughter and son-in-law. All verbalized that they understood and in agreement. Discussed with patient the signs and symptoms that would warrant a prompt return to the emergency department. I included these signs and symptoms on patient's discharge paperwork. Pt Verbalized that theyunderstood. Patient to follow-up with primary care provider. Patient was discharged home in stable condition with a prescription for Robaxin and instructed to take her regular chronic pain medication as prescribed. Brianne Gomez; 3/15/2022 @8:58 PM Voice dictation software was used during the making of this note. This software is not perfect and grammatical and other typographical errors may be present.   This note has not been proofread for errors.  ===================================================================         Amount and/or Complexity of Data Reviewed  Tests in the radiology section of CPT®: ordered and reviewed  Independent visualization of images, tracings, or specimens: yes    Risk of Complications, Morbidity, and/or Mortality  Presenting problems: moderate  Diagnostic procedures: low  Management options: low    Patient Progress  Patient progress: stable    ED Course as of 03/15/22 1531   Tue Mar 15, 2022   1451 XR RIBS LT UNI 2 V  FINDINGS: The lungs are expanded and clear. There is no pleural effusion or  pneumothorax. No displaced rib fracture appreciated.     IMPRESSION  No displaced rib fracture noted. [JG]   1451 XR HIP RT W OR WO PELV 2-3 VWS  FINDINGS: There is no fracture. The femoral heads are well located. The joint  spaces are maintained. The SI joints and pubic symphysis are normal. The soft  tissues are unremarkable.     IMPRESSION  No acute osseous abnormality joint derangement of the bilateral  hips. [JG]   1451 XR HIP LT W OR WO PELV 2-3 VWS  FINDINGS: There is no fracture. The femoral heads are well located. The joint  spaces are maintained. The SI joints and pubic symphysis are normal. The soft  tissues are unremarkable.     IMPRESSION  No acute osseous abnormality joint derangement of the bilateral  hips.  [JG]      ED Course User Index  [JG] Brianne Pink       Procedures

## 2022-03-18 PROBLEM — R53.82 CHRONIC FATIGUE: Status: ACTIVE | Noted: 2021-11-19

## 2022-03-18 PROBLEM — R06.09 DYSPNEA ON EXERTION: Status: ACTIVE | Noted: 2021-02-11

## 2022-03-19 PROBLEM — R05.3 CHRONIC COUGH: Status: ACTIVE | Noted: 2021-02-11

## 2022-03-19 PROBLEM — R93.1 ABNORMAL NUCLEAR CARDIAC IMAGING TEST: Status: ACTIVE | Noted: 2021-04-12

## 2022-03-19 PROBLEM — F11.99 OPIOID USE, UNSPECIFIED WITH UNSPECIFIED OPIOID-INDUCED DISORDER (HCC): Status: ACTIVE | Noted: 2022-03-11

## 2022-03-19 PROBLEM — I48.11 LONGSTANDING PERSISTENT ATRIAL FIBRILLATION (HCC): Status: ACTIVE | Noted: 2021-03-08

## 2022-03-19 PROBLEM — R00.1 BRADYCARDIA: Status: ACTIVE | Noted: 2021-11-19

## 2022-03-19 PROBLEM — G25.81 RLS (RESTLESS LEGS SYNDROME): Status: ACTIVE | Noted: 2021-02-11

## 2022-03-20 PROBLEM — R29.818 SUSPECTED SLEEP APNEA: Status: ACTIVE | Noted: 2021-02-11

## 2022-03-21 ENCOUNTER — HOSPITAL ENCOUNTER (INPATIENT)
Age: 82
LOS: 8 days | Discharge: HOME HEALTH | DRG: 516 | End: 2022-03-29
Attending: EMERGENCY MEDICINE | Admitting: ORTHOPAEDIC SURGERY
Payer: MEDICARE

## 2022-03-21 ENCOUNTER — APPOINTMENT (OUTPATIENT)
Dept: CT IMAGING | Age: 82
DRG: 516 | End: 2022-03-21
Attending: EMERGENCY MEDICINE
Payer: MEDICARE

## 2022-03-21 DIAGNOSIS — S32.000A COMPRESSION FRACTURE OF LUMBOSACRAL SPINE, CLOSED, INITIAL ENCOUNTER (HCC): ICD-10-CM

## 2022-03-21 DIAGNOSIS — R29.6 FREQUENT FALLS: ICD-10-CM

## 2022-03-21 DIAGNOSIS — I25.10 CORONARY ARTERY DISEASE INVOLVING NATIVE CORONARY ARTERY OF NATIVE HEART WITHOUT ANGINA PECTORIS: ICD-10-CM

## 2022-03-21 DIAGNOSIS — I10 PRIMARY HYPERTENSION: ICD-10-CM

## 2022-03-21 DIAGNOSIS — G89.29 CHRONIC BILATERAL LOW BACK PAIN WITH BILATERAL SCIATICA: ICD-10-CM

## 2022-03-21 DIAGNOSIS — I48.11 LONGSTANDING PERSISTENT ATRIAL FIBRILLATION (HCC): ICD-10-CM

## 2022-03-21 DIAGNOSIS — M80.08XA FRACTURE OF VERTEBRA DUE TO OSTEOPOROSIS, INITIAL ENCOUNTER (HCC): Primary | ICD-10-CM

## 2022-03-21 DIAGNOSIS — M54.41 CHRONIC BILATERAL LOW BACK PAIN WITH BILATERAL SCIATICA: ICD-10-CM

## 2022-03-21 DIAGNOSIS — M54.42 CHRONIC BILATERAL LOW BACK PAIN WITH BILATERAL SCIATICA: ICD-10-CM

## 2022-03-21 LAB
ALBUMIN SERPL-MCNC: 2.9 G/DL (ref 3.2–4.6)
ALBUMIN/GLOB SERPL: 0.8 {RATIO} (ref 1.2–3.5)
ALP SERPL-CCNC: 114 U/L (ref 50–136)
ALT SERPL-CCNC: 23 U/L (ref 12–65)
ANION GAP SERPL CALC-SCNC: 6 MMOL/L (ref 7–16)
AST SERPL-CCNC: 31 U/L (ref 15–37)
BASOPHILS # BLD: 0.1 K/UL (ref 0–0.2)
BASOPHILS NFR BLD: 1 % (ref 0–2)
BILIRUB SERPL-MCNC: 0.4 MG/DL (ref 0.2–1.1)
BUN SERPL-MCNC: 20 MG/DL (ref 8–23)
CALCIUM SERPL-MCNC: 8.8 MG/DL (ref 8.3–10.4)
CHLORIDE SERPL-SCNC: 105 MMOL/L (ref 98–107)
CO2 SERPL-SCNC: 29 MMOL/L (ref 21–32)
CREAT SERPL-MCNC: 1.1 MG/DL (ref 0.6–1)
DIFFERENTIAL METHOD BLD: ABNORMAL
EOSINOPHIL # BLD: 0.2 K/UL (ref 0–0.8)
EOSINOPHIL NFR BLD: 2 % (ref 0.5–7.8)
ERYTHROCYTE [DISTWIDTH] IN BLOOD BY AUTOMATED COUNT: 13.1 % (ref 11.9–14.6)
GLOBULIN SER CALC-MCNC: 3.5 G/DL (ref 2.3–3.5)
GLUCOSE SERPL-MCNC: 135 MG/DL (ref 65–100)
HCT VFR BLD AUTO: 33.1 % (ref 35.8–46.3)
HGB BLD-MCNC: 10.9 G/DL (ref 11.7–15.4)
IMM GRANULOCYTES # BLD AUTO: 0 K/UL (ref 0–0.5)
IMM GRANULOCYTES NFR BLD AUTO: 0 % (ref 0–5)
INR PPP: 7.5
LYMPHOCYTES # BLD: 1.9 K/UL (ref 0.5–4.6)
LYMPHOCYTES NFR BLD: 18 % (ref 13–44)
MAGNESIUM SERPL-MCNC: 2.1 MG/DL (ref 1.8–2.4)
MCH RBC QN AUTO: 32.3 PG (ref 26.1–32.9)
MCHC RBC AUTO-ENTMCNC: 32.9 G/DL (ref 31.4–35)
MCV RBC AUTO: 98.2 FL (ref 79.6–97.8)
MONOCYTES # BLD: 0.8 K/UL (ref 0.1–1.3)
MONOCYTES NFR BLD: 7 % (ref 4–12)
NEUTS SEG # BLD: 7.6 K/UL (ref 1.7–8.2)
NEUTS SEG NFR BLD: 72 % (ref 43–78)
NRBC # BLD: 0 K/UL (ref 0–0.2)
PLATELET # BLD AUTO: 268 K/UL (ref 150–450)
PMV BLD AUTO: 10.4 FL (ref 9.4–12.3)
POTASSIUM SERPL-SCNC: 3.4 MMOL/L (ref 3.5–5.1)
PROT SERPL-MCNC: 6.4 G/DL (ref 6.3–8.2)
PROTHROMBIN TIME: 64.2 SEC (ref 12.6–14.5)
RBC # BLD AUTO: 3.37 M/UL (ref 4.05–5.2)
SODIUM SERPL-SCNC: 140 MMOL/L (ref 136–145)
WBC # BLD AUTO: 10.6 K/UL (ref 4.3–11.1)

## 2022-03-21 PROCEDURE — 65660000000 HC RM CCU STEPDOWN

## 2022-03-21 PROCEDURE — 70450 CT HEAD/BRAIN W/O DYE: CPT

## 2022-03-21 PROCEDURE — 74011000250 HC RX REV CODE- 250: Performed by: PHYSICIAN ASSISTANT

## 2022-03-21 PROCEDURE — 80053 COMPREHEN METABOLIC PANEL: CPT

## 2022-03-21 PROCEDURE — 72125 CT NECK SPINE W/O DYE: CPT

## 2022-03-21 PROCEDURE — 77030040361 HC SLV COMPR DVT MDII -B

## 2022-03-21 PROCEDURE — 77030038269 HC DRN EXT URIN PURWCK BARD -A

## 2022-03-21 PROCEDURE — 65270000029 HC RM PRIVATE

## 2022-03-21 PROCEDURE — 77030027138 HC INCENT SPIROMETER -A

## 2022-03-21 PROCEDURE — 93005 ELECTROCARDIOGRAM TRACING: CPT | Performed by: EMERGENCY MEDICINE

## 2022-03-21 PROCEDURE — 83735 ASSAY OF MAGNESIUM: CPT

## 2022-03-21 PROCEDURE — 99285 EMERGENCY DEPT VISIT HI MDM: CPT

## 2022-03-21 PROCEDURE — 85025 COMPLETE CBC W/AUTO DIFF WBC: CPT

## 2022-03-21 PROCEDURE — 2709999900 HC NON-CHARGEABLE SUPPLY

## 2022-03-21 PROCEDURE — 85610 PROTHROMBIN TIME: CPT

## 2022-03-21 RX ORDER — HYDROMORPHONE HYDROCHLORIDE 2 MG/1
2 TABLET ORAL
Status: DISCONTINUED | OUTPATIENT
Start: 2022-03-21 | End: 2022-03-29 | Stop reason: HOSPADM

## 2022-03-21 RX ORDER — ACETAMINOPHEN 650 MG/1
650 SUPPOSITORY RECTAL
Status: DISCONTINUED | OUTPATIENT
Start: 2022-03-21 | End: 2022-03-29 | Stop reason: HOSPADM

## 2022-03-21 RX ORDER — HYDROMORPHONE HYDROCHLORIDE 1 MG/ML
0.5 INJECTION, SOLUTION INTRAMUSCULAR; INTRAVENOUS; SUBCUTANEOUS
Status: DISCONTINUED | OUTPATIENT
Start: 2022-03-21 | End: 2022-03-29 | Stop reason: HOSPADM

## 2022-03-21 RX ORDER — POLYETHYLENE GLYCOL 3350 17 G/17G
17 POWDER, FOR SOLUTION ORAL DAILY PRN
Status: DISCONTINUED | OUTPATIENT
Start: 2022-03-21 | End: 2022-03-29 | Stop reason: HOSPADM

## 2022-03-21 RX ORDER — SODIUM CHLORIDE 0.9 % (FLUSH) 0.9 %
5-40 SYRINGE (ML) INJECTION EVERY 8 HOURS
Status: DISCONTINUED | OUTPATIENT
Start: 2022-03-21 | End: 2022-03-29 | Stop reason: HOSPADM

## 2022-03-21 RX ORDER — ONDANSETRON 4 MG/1
4 TABLET, ORALLY DISINTEGRATING ORAL
Status: DISCONTINUED | OUTPATIENT
Start: 2022-03-21 | End: 2022-03-29 | Stop reason: HOSPADM

## 2022-03-21 RX ORDER — ONDANSETRON 2 MG/ML
4 INJECTION INTRAMUSCULAR; INTRAVENOUS
Status: DISCONTINUED | OUTPATIENT
Start: 2022-03-21 | End: 2022-03-29 | Stop reason: HOSPADM

## 2022-03-21 RX ORDER — ACETAMINOPHEN 325 MG/1
650 TABLET ORAL
Status: DISCONTINUED | OUTPATIENT
Start: 2022-03-21 | End: 2022-03-29 | Stop reason: HOSPADM

## 2022-03-21 RX ORDER — SODIUM CHLORIDE 0.9 % (FLUSH) 0.9 %
5-40 SYRINGE (ML) INJECTION AS NEEDED
Status: DISCONTINUED | OUTPATIENT
Start: 2022-03-21 | End: 2022-03-29 | Stop reason: HOSPADM

## 2022-03-21 RX ORDER — PHYTONADIONE 5 MG/1
10 TABLET ORAL
Status: COMPLETED | OUTPATIENT
Start: 2022-03-21 | End: 2022-03-22

## 2022-03-21 RX ORDER — DEXTROSE MONOHYDRATE AND SODIUM CHLORIDE 5; .45 G/100ML; G/100ML
75 INJECTION, SOLUTION INTRAVENOUS CONTINUOUS
Status: DISCONTINUED | OUTPATIENT
Start: 2022-03-21 | End: 2022-03-22

## 2022-03-21 RX ADMIN — DEXTROSE MONOHYDRATE AND SODIUM CHLORIDE 75 ML/HR: 5; .45 INJECTION, SOLUTION INTRAVENOUS at 22:28

## 2022-03-21 NOTE — PROGRESS NOTES
[address] [facility]      Patient ID:  Lake Magaña  [id number]  [de-identified] y.o.  1940    Today: March 21, 2022         CC: low back pain    HPI:  Pt of Dr. Slime Shipman with acute lumbar compression fractures who was directed today by Gloria to go to the hospital for admission and she had another fall today. Surgery with Dr. Slime Shipman is scheduled for Friday. Past Medical/Surgical History:  Past Medical History:   Diagnosis Date    Arrhythmia     Benign essential HTN     managed with med    CAD (coronary artery disease) 04/2021    cardiac stent distal LAD, PTCA    Macrocytic anemia 2019    Persistent atrial fibrillation (HCC)     Restless legs     Sleep apnea     no cpap, dental device from sleep dentist     Past Surgical History:   Procedure Laterality Date    HX APPENDECTOMY  age 9   Makayla Fears WRIST FRACTURE TX Left 07/23/2020    PA CARDIAC SURG PROCEDURE UNLIST  04/2021    card. cath, LAURENCE distal LAD, PTCA        Allergies: No Known Allergies                Objective: Patient not seen by me. I am assisting with direct admit only                  Meds:   No current facility-administered medications for this encounter. Current Outpatient Medications   Medication Sig    clopidogreL (Plavix) 75 mg tab Take  by mouth. Stopped at the beginning of 3/16/2022    ibuprofen (MOTRIN) 600 mg tablet Take  by mouth every six (6) hours as needed for Pain.  aspirin (ASPIR-81 PO) Take  by mouth.  methocarbamoL (ROBAXIN) 500 mg tablet Take 1 Tablet by mouth three (3) times daily for 30 doses. Indications: muscle spasm, You do not need to take this 3 times a day    oxyCODONE-acetaminophen (PERCOCET 7.5) 7.5-325 mg per tablet TAKE 1 TABLET BY MOUTH FOUR TIMES DAILY    warfarin (COUMADIN) 6 mg tablet Take 6 mg by mouth daily.  traMADoL (ULTRAM) 50 mg tablet Take 1 Tablet by mouth every six (6) hours as needed for Pain for up to 21 days.  Max Daily Amount: 200 mg.    lisinopriL (PRINIVIL, ZESTRIL) 10 mg tablet Take 1 Tablet by mouth daily.  nitroglycerin (NITROSTAT) 0.4 mg SL tablet 1 Tab by SubLINGual route every five (5) minutes as needed for Chest Pain. Up to 3 doses.  gabapentin (NEURONTIN) 100 mg capsule Take 100 mg by mouth two (2) times a day.  furosemide (LASIX) 20 mg tablet Take 20 mg by mouth daily. On hold for now related to dehydration 3/21/2022 per daughter.  metoprolol succinate (Toprol XL) 25 mg XL tablet Take 1 Tab by mouth daily. (Patient taking differently: Take 12.5 mg by mouth daily.)    acetaminophen (TylenoL) 325 mg tablet Take 650 mg by mouth every four (4) hours as needed for Pain. Assessment: closed acute lumbar compression fractures      Plan:  I have notified Dr. Tamiko Lewis. Will admit to [facility] for pain control and in anticipation of surgery with him for Friday. Will consult the hopsitalist for medical clearance and mgmt. ER doc is obtaining head CT. We expect this to be normal. NPO after midnight Friday morning. Hold blood thinners.        Signed By: DENIS Mckay  March 21, 2022

## 2022-03-21 NOTE — ED PROVIDER NOTES
[facility] [de-identified] y.o. female seen on 3/21/2022 in the [facility] EMERGENCY DEPT in room 2002/NIDIA. Chief Complaint   Patient presents with   Ng Fall     HPI: 50-year-old  female presented to the emergency department via EMS with complaints of low low back pain and inability to ambulate secondary to multiple lumbar compression fractures and chronic pain. Patient is scheduled to have surgery on Friday for compression fractures with Dr. Rufus Jamil. Patient fell again today slipping off the toilet. She was unable to get up secondary to her pain. Patient's daughter states that she has been lying in the bed for the past week secondary to pain and they are unable to care for her adequately secondary to her pain. Patient was seen in the ER last week for a fall. Patient is on Plavix (currently being held for surgery) and Coumadin (last dose yesterday). Patient denies any new injuries or hitting her head with her fall today. She denies any numbness, bowel or bladder incontinence or retention, saddle anesthesia or any other neurologic complaints at this time. Historian: Patient/previous medical records    REVIEW OF SYSTEMS     Review of Systems   Constitutional: Positive for activity change. HENT: Negative. Respiratory: Negative. Cardiovascular: Negative. Gastrointestinal: Negative. Genitourinary: Negative. Musculoskeletal: Positive for back pain and gait problem. Skin: Negative. Psychiatric/Behavioral: Negative. All other systems reviewed and are negative.       PAST MEDICAL HISTORY     Past Medical History:   Diagnosis Date    Arrhythmia     Benign essential HTN     managed with med    CAD (coronary artery disease) 04/2021    cardiac stent distal LAD, PTCA    Macrocytic anemia 2019    Persistent atrial fibrillation (HCC)     Restless legs     Sleep apnea     no cpap, dental device from sleep dentist     Past Surgical History:   Procedure Laterality Date    HX APPENDECTOMY  age 10    [de-identified] WRIST FRACTURE TX Left 2020    MS CARDIAC SURG PROCEDURE UNLIST  2021    card. cath, LAURENCE distal LAD, PTCA     Social History     Socioeconomic History    Marital status:    Tobacco Use    Smoking status: Never Smoker    Smokeless tobacco: Never Used    Tobacco comment: heavy secondhand exposure   Vaping Use    Vaping Use: Never used   Substance and Sexual Activity    Alcohol use: Never    Drug use: Never     Prior to Admission Medications   Prescriptions Last Dose Informant Patient Reported? Taking?   acetaminophen (TylenoL) 325 mg tablet   Yes No   Sig: Take 650 mg by mouth every four (4) hours as needed for Pain. aspirin (ASPIR-81 PO)   Yes No   Sig: Take  by mouth. clopidogreL (Plavix) 75 mg tab   Yes No   Sig: Take  by mouth. Stopped at the beginning of 3/16/2022   furosemide (LASIX) 20 mg tablet   Yes No   Sig: Take 20 mg by mouth daily. On hold for now related to dehydration 3/21/2022 per daughter. gabapentin (NEURONTIN) 100 mg capsule   Yes No   Sig: Take 100 mg by mouth two (2) times a day. ibuprofen (MOTRIN) 600 mg tablet   Yes No   Sig: Take  by mouth every six (6) hours as needed for Pain. lisinopriL (PRINIVIL, ZESTRIL) 10 mg tablet   No No   Sig: Take 1 Tablet by mouth daily. methocarbamoL (ROBAXIN) 500 mg tablet   No No   Sig: Take 1 Tablet by mouth three (3) times daily for 30 doses. Indications: muscle spasm, You do not need to take this 3 times a day   metoprolol succinate (Toprol XL) 25 mg XL tablet   No No   Sig: Take 1 Tab by mouth daily. Patient taking differently: Take 12.5 mg by mouth daily. nitroglycerin (NITROSTAT) 0.4 mg SL tablet   No No   Si Tab by SubLINGual route every five (5) minutes as needed for Chest Pain. Up to 3 doses.    oxyCODONE-acetaminophen (PERCOCET 7.5) 7.5-325 mg per tablet   Yes No   Sig: TAKE 1 TABLET BY MOUTH FOUR TIMES DAILY   traMADoL (ULTRAM) 50 mg tablet   No No   Sig: Take 1 Tablet by mouth every six (6) hours as needed for Pain for up to 21 days. Max Daily Amount: 200 mg.   warfarin (COUMADIN) 6 mg tablet   Yes No   Sig: Take 6 mg by mouth daily. Facility-Administered Medications: None     No Known Allergies     PHYSICAL EXAM       Vitals:    03/21/22 1938 03/21/22 1955 03/21/22 2008 03/21/22 2117   BP: (!) 149/71 127/66  125/68   Pulse: 66  64 66   Resp: 18  17 18   Temp: 97.9 °F (36.6 °C)   98.1 °F (36.7 °C)   SpO2: 93% 93% 95% 95%    Vital signs were reviewed. Physical Exam  Vitals and nursing note reviewed. Constitutional:       General: She is not in acute distress. Appearance: Normal appearance. She is not ill-appearing or toxic-appearing. HENT:      Head: Normocephalic and atraumatic. Mouth/Throat:      Mouth: Mucous membranes are moist.   Eyes:      Extraocular Movements: Extraocular movements intact. Pupils: Pupils are equal, round, and reactive to light. Cardiovascular:      Rate and Rhythm: Normal rate and regular rhythm. Pulses: Normal pulses. Heart sounds: Normal heart sounds. Pulmonary:      Effort: Pulmonary effort is normal.      Breath sounds: Normal breath sounds. Abdominal:      Palpations: Abdomen is soft. Tenderness: There is no abdominal tenderness. Musculoskeletal:         General: Normal range of motion. Cervical back: Normal range of motion. Comments: Low back tenderness to palpation   Skin:     General: Skin is warm and dry. Neurological:      General: No focal deficit present. Mental Status: She is alert and oriented to person, place, and time. Psychiatric:         Mood and Affect: Mood normal.         Behavior: Behavior normal.         Thought Content:  Thought content normal.         Judgment: Judgment normal.          MEDICAL DECISION MAKING     ED Course:    Orders Placed This Encounter    CT HEAD WO CONT    CT SPINE CERV WO CONT    CBC WITH AUTOMATED DIFF  PROTHROMBIN TIME + INR    METABOLIC PANEL, COMPREHENSIVE    MAGNESIUM    URINALYSIS W/ RFLX MICROSCOPIC    ADULT DIET Regular    VITAL SIGNS    NOTIFY PROVIDER: SPECIFY Notify physician for pulse less than 50 or greater than 120, respiratory rate less than 12 or greater than 25, oral temperature greater than 101.3 F (18.0 C), systolic BP less than 90 or greater than 301, diastolic BP less. ..    BEDREST, COMPLETE    INTAKE AND OUTPUT    INCENTIVE SPIROMETRY    APPLY/MAINTAIN SEQUENTIAL COMPRESSION DEVICE    FULL CODE    EKG, 12 LEAD, INITIAL    SALINE LOCK IV ONE TIME STAT    dextrose 5 % - 0.45% NaCl infusion    sodium chloride (NS) flush 5-40 mL    sodium chloride (NS) flush 5-40 mL    OR Linked Order Group     acetaminophen (TYLENOL) tablet 650 mg     acetaminophen (TYLENOL) suppository 650 mg    polyethylene glycol (MIRALAX) packet 17 g    OR Linked Order Group     ondansetron (ZOFRAN ODT) tablet 4 mg     ondansetron (ZOFRAN) injection 4 mg    HYDROmorphone (DILAUDID) injection 0.5 mg    HYDROmorphone (DILAUDID) tablet 2 mg    IP CONSULT TO HOSPITALIST    INITIAL PHYSICIAN ORDER: INPATIENT Orthopedics; 2.  Patient admitted for Inpatient Only Procedure (Surgical)    IP CONSULT TO SOCIAL WORK     Recent Results (from the past 8 hour(s))   EKG, 12 LEAD, INITIAL    Collection Time: 03/21/22  7:59 PM   Result Value Ref Range    Ventricular Rate 64 BPM    Atrial Rate 65 BPM    P-R Interval 198 ms    QRS Duration 116 ms    Q-T Interval 428 ms    QTC Calculation (Bezet) 442 ms    Calculated P Axis 18 degrees    Calculated R Axis -20 degrees    Calculated T Axis 15 degrees    Diagnosis       Sinus rhythm  Probable left ventricular hypertrophy  Borderline T abnormalities, anterior leads     CBC WITH AUTOMATED DIFF    Collection Time: 03/21/22  8:07 PM   Result Value Ref Range    WBC 10.6 4.3 - 11.1 K/uL    RBC 3.37 (L) 4.05 - 5.2 M/uL    HGB 10.9 (L) 11.7 - 15.4 g/dL    HCT 33.1 (L) 35.8 - 46.3 %    MCV 98.2 (H) 79.6 - 97.8 FL    MCH 32.3 26.1 - 32.9 PG    MCHC 32.9 31.4 - 35.0 g/dL    RDW 13.1 11.9 - 14.6 %    PLATELET 216 335 - 476 K/uL    MPV 10.4 9.4 - 12.3 FL    ABSOLUTE NRBC 0.00 0.0 - 0.2 K/uL    DF AUTOMATED      NEUTROPHILS 72 43 - 78 %    LYMPHOCYTES 18 13 - 44 %    MONOCYTES 7 4.0 - 12.0 %    EOSINOPHILS 2 0.5 - 7.8 %    BASOPHILS 1 0.0 - 2.0 %    IMMATURE GRANULOCYTES 0 0.0 - 5.0 %    ABS. NEUTROPHILS 7.6 1.7 - 8.2 K/UL    ABS. LYMPHOCYTES 1.9 0.5 - 4.6 K/UL    ABS. MONOCYTES 0.8 0.1 - 1.3 K/UL    ABS. EOSINOPHILS 0.2 0.0 - 0.8 K/UL    ABS. BASOPHILS 0.1 0.0 - 0.2 K/UL    ABS. IMM. GRANS. 0.0 0.0 - 0.5 K/UL   PROTHROMBIN TIME + INR    Collection Time: 03/21/22  8:07 PM   Result Value Ref Range    Prothrombin time 64.2 (H) 12.6 - 14.5 sec    INR 7.5 (HH)     METABOLIC PANEL, COMPREHENSIVE    Collection Time: 03/21/22  8:07 PM   Result Value Ref Range    Sodium 140 136 - 145 mmol/L    Potassium 3.4 (L) 3.5 - 5.1 mmol/L    Chloride 105 98 - 107 mmol/L    CO2 29 21 - 32 mmol/L    Anion gap 6 (L) 7 - 16 mmol/L    Glucose 135 (H) 65 - 100 mg/dL    BUN 20 8 - 23 MG/DL    Creatinine 1.10 (H) 0.6 - 1.0 MG/DL    GFR est AA >60 >60 ml/min/1.73m2    GFR est non-AA 51 (L) >60 ml/min/1.73m2    Calcium 8.8 8.3 - 10.4 MG/DL    Bilirubin, total 0.4 0.2 - 1.1 MG/DL    ALT (SGPT) 23 12 - 65 U/L    AST (SGOT) 31 15 - 37 U/L    Alk. phosphatase 114 50 - 136 U/L    Protein, total 6.4 6.3 - 8.2 g/dL    Albumin 2.9 (L) 3.2 - 4.6 g/dL    Globulin 3.5 2.3 - 3.5 g/dL    A-G Ratio 0.8 (L) 1.2 - 3.5     MAGNESIUM    Collection Time: 03/21/22  8:07 PM   Result Value Ref Range    Magnesium 2.1 1.8 - 2.4 mg/dL     CT HEAD WO CONT    Result Date: 3/21/2022  Exam: CT HEAD WO CONT on 3/21/2022 8:25 PM Clinical History: The Female patient is [de-identified]years old  presenting for pain following fall. Technique: Thin slice axial CT images through the brain were obtained.   All CT scans at this facility are performed using dose reduction/dose modulation techniques, as appropriate the performed exam, including the following: Automated Exposure Control; Adjustment of the mA and/or kV according to patient size (this includes techniques or standardized protocols for targeted exams where dose is matched to indication/reason for exam); and Use of Iterative Reconstruction Technique. Radiation Exposure Indices: Reference Air Kerma (Ka,r) = 969 mGy-cm Comparison:  969. Findings:  Cerebrum: Age-related senescent changes are seen with sulcal and ventricular prominence. . There is mild chronic periventricular white matter disease with few lacunae throughout the basal ganglia. No evidence of intracranial hemorrhage, mass, or other space-occupying lesion is seen. There are no abnormal extra-axial fluid collections. Cerebellum: Involutional changes are demonstrated. CSF spaces: The ventricular system is within normal limits. The basilar cisterns are unremarkable. Brainstem: No evidence of ischemia, hemorrhage, or mass. Extracranial tissues: Visualized orbits and extracranial soft tissues are unremarkable. Paranasal sinuses/Mastoids: Well-pneumatized and aerated. . Calvarium: No acute osseous abnormality. 1.  No acute intracranial abnormality. CPT code 73053     CT SPINE CERV WO CONT    Result Date: 3/21/2022  Exam: CT SPINE CERV WO CONT on 3/21/2022 8:37 PM Clinical History: The Female patient is [de-identified]years old  presenting for pain following fall. Technique: Thin section axial CT images were obtained through the entire cervical spine. To optimally assess the base of the dens and C2 vertebra, coronal multiplanar reformatted images were created. To optimally assess vertebral alignment and prevertebral soft tissues as well as spinous processes and posterior elements, sagittal multiplanar reformatted images were created from the original axial data. The axial and reformatted data was available for review.  All CT scans at this facility are performed using dose reduction/dose modulation techniques, as appropriate the performed exam, including the following: Automated Exposure Control; Adjustment of the mA and/or kV according to patient size (this includes techniques or standardized protocols for targeted exams where dose is matched to indication/reason for exam); and Use of Iterative Reconstruction Technique. Total radiation dose 969 mGy-cm. Comparison: None. FINDINGS: The craniocervical junction is unremarkable. There is loss of cervical lordosis however with normal alignment maintained. Moderate to severe disc height loss is demonstrated at C5/6 and C6/7 with minimal ventral endplate spondylosis. There is mild associated facet arthropathy. The visualized paraspinous soft tissues are unremarkable. Please note that cord or ligamentous injury cannot be excluded on the basis of this exam.     1. Loss of cervical lordosis with chronic degenerative changes. CPT code(s) [id number]     EKG interpretation personally: Rate 64. Sinus rhythm. Normal FL and QT intervals. ED Course as of 03/21/22 2132   Mon Mar 21, 2022   1951 Discussed with DENIS Mckay. He will put in orders for admission. [JL]      ED Course User Index  [JL] Aggie Meraz DO     MDM  Number of Diagnoses or Management Options  Chronic bilateral low back pain with bilateral sciatica  Compression fracture of lumbosacral spine, closed, initial encounter [facility])  Frequent falls  Diagnosis management comments: 44-year-old female present emergency department with acute on chronic back pain secondary to multiple falls and compression fractures. Patient imaging today for head and neck were good. Patient's labs are reassuring as well. Patient's INR is elevated however patient was admitted to the floor before her INR resulted. Patient is admitted to orthopedics. Patient will have her Coumadin held and will have surgery scheduled.        Amount and/or Complexity of Data Reviewed  Clinical lab tests: ordered and reviewed  Tests in the radiology section of CPT®: ordered and reviewed  Decide to obtain previous medical records or to obtain history from someone other than the patient: yes  Obtain history from someone other than the patient: yes  Review and summarize past medical records: yes  Discuss the patient with other providers: yes  Independent visualization of images, tracings, or specimens: yes    Patient Progress  Patient progress: stable      Disposition:  Admitted  Diagnosis:     ICD-10-CM ICD-9-CM   1. Frequent falls  R29.6 V15.88   2. Chronic bilateral low back pain with bilateral sciatica  M54.42 724.2    M54.41 724.3    G89.29 338.29   3. Compression [facility] lumbosacral spine, [facility]  S32.000A 805.4     ____________________________________________________________________  A portion of this note was generated using voice recognition dictation software. While the note has been reviewed for accuracy, please note certain words and phrases may not be transcribed as intended and some grammatical and/or typographical errors may be present.

## 2022-03-21 NOTE — ED TRIAGE NOTES
Pt having lumbar surgery on Friday and family sent her in saying she was a direct admit and that she has been falling and they cant take care of getting to the bathroom     Nursing supervisor stated pt is not currently a direct admit

## 2022-03-22 PROBLEM — I10 HTN (HYPERTENSION): Status: ACTIVE | Noted: 2022-03-22

## 2022-03-22 PROBLEM — I25.10 CAD (CORONARY ARTERY DISEASE): Status: ACTIVE | Noted: 2022-03-22

## 2022-03-22 LAB
APPEARANCE UR: CLEAR
ATRIAL RATE: 65 BPM
BACTERIA URNS QL MICRO: 0 /HPF
BILIRUB UR QL: NEGATIVE
CALCULATED P AXIS, ECG09: 18 DEGREES
CALCULATED R AXIS, ECG10: -20 DEGREES
CALCULATED T AXIS, ECG11: 15 DEGREES
CASTS URNS QL MICRO: 0 /LPF
COLOR UR: YELLOW
DIAGNOSIS, 93000: NORMAL
EPI CELLS #/AREA URNS HPF: ABNORMAL /HPF
GLUCOSE UR STRIP.AUTO-MCNC: NEGATIVE MG/DL
HGB UR QL STRIP: NEGATIVE
INR PPP: 3.7
KETONES UR QL STRIP.AUTO: NEGATIVE MG/DL
LEUKOCYTE ESTERASE UR QL STRIP.AUTO: ABNORMAL
NITRITE UR QL STRIP.AUTO: NEGATIVE
P-R INTERVAL, ECG05: 198 MS
PH UR STRIP: 5.5 [PH] (ref 5–9)
PROT UR STRIP-MCNC: NEGATIVE MG/DL
PROTHROMBIN TIME: 37 SEC (ref 12.6–14.5)
Q-T INTERVAL, ECG07: 428 MS
QRS DURATION, ECG06: 116 MS
QTC CALCULATION (BEZET), ECG08: 442 MS
RBC #/AREA URNS HPF: ABNORMAL /HPF
SP GR UR REFRACTOMETRY: 1.02 (ref 1–1.02)
UROBILINOGEN UR QL STRIP.AUTO: 1 EU/DL (ref 0.2–1)
VENTRICULAR RATE, ECG03: 64 BPM
WBC URNS QL MICRO: ABNORMAL /HPF

## 2022-03-22 PROCEDURE — 65270000029 HC RM PRIVATE

## 2022-03-22 PROCEDURE — 74011000250 HC RX REV CODE- 250: Performed by: HOSPITALIST

## 2022-03-22 PROCEDURE — 74011250637 HC RX REV CODE- 250/637: Performed by: HOSPITALIST

## 2022-03-22 PROCEDURE — 99222 1ST HOSP IP/OBS MODERATE 55: CPT | Performed by: PHYSICIAN ASSISTANT

## 2022-03-22 PROCEDURE — 99223 1ST HOSP IP/OBS HIGH 75: CPT | Performed by: INTERNAL MEDICINE

## 2022-03-22 PROCEDURE — 81001 URINALYSIS AUTO W/SCOPE: CPT

## 2022-03-22 PROCEDURE — 86580 TB INTRADERMAL TEST: CPT | Performed by: HOSPITALIST

## 2022-03-22 PROCEDURE — 74011250636 HC RX REV CODE- 250/636: Performed by: FAMILY MEDICINE

## 2022-03-22 PROCEDURE — 74011250636 HC RX REV CODE- 250/636: Performed by: HOSPITALIST

## 2022-03-22 PROCEDURE — 85610 PROTHROMBIN TIME: CPT

## 2022-03-22 PROCEDURE — 74011000250 HC RX REV CODE- 250: Performed by: PHYSICIAN ASSISTANT

## 2022-03-22 PROCEDURE — 36415 COLL VENOUS BLD VENIPUNCTURE: CPT

## 2022-03-22 PROCEDURE — 74011250637 HC RX REV CODE- 250/637: Performed by: FAMILY MEDICINE

## 2022-03-22 PROCEDURE — 74011250637 HC RX REV CODE- 250/637: Performed by: PHYSICIAN ASSISTANT

## 2022-03-22 PROCEDURE — 65660000000 HC RM CCU STEPDOWN

## 2022-03-22 RX ORDER — POTASSIUM CHLORIDE 20 MEQ/1
40 TABLET, EXTENDED RELEASE ORAL
Status: COMPLETED | OUTPATIENT
Start: 2022-03-22 | End: 2022-03-22

## 2022-03-22 RX ORDER — HYDRALAZINE HYDROCHLORIDE 20 MG/ML
10 INJECTION INTRAMUSCULAR; INTRAVENOUS
Status: DISCONTINUED | OUTPATIENT
Start: 2022-03-22 | End: 2022-03-23

## 2022-03-22 RX ORDER — HYDRALAZINE HYDROCHLORIDE 20 MG/ML
10 INJECTION INTRAMUSCULAR; INTRAVENOUS
Status: DISCONTINUED | OUTPATIENT
Start: 2022-03-22 | End: 2022-03-22

## 2022-03-22 RX ORDER — METHOCARBAMOL 500 MG/1
500 TABLET, FILM COATED ORAL 3 TIMES DAILY
Status: DISCONTINUED | OUTPATIENT
Start: 2022-03-22 | End: 2022-03-29 | Stop reason: HOSPADM

## 2022-03-22 RX ORDER — AMLODIPINE BESYLATE 10 MG/1
10 TABLET ORAL
Status: DISCONTINUED | OUTPATIENT
Start: 2022-03-22 | End: 2022-03-23

## 2022-03-22 RX ORDER — GABAPENTIN 100 MG/1
100 CAPSULE ORAL 2 TIMES DAILY
Status: DISCONTINUED | OUTPATIENT
Start: 2022-03-22 | End: 2022-03-29 | Stop reason: HOSPADM

## 2022-03-22 RX ORDER — METOPROLOL SUCCINATE 25 MG/1
12.5 TABLET, EXTENDED RELEASE ORAL DAILY
Status: DISCONTINUED | OUTPATIENT
Start: 2022-03-22 | End: 2022-03-24

## 2022-03-22 RX ORDER — ASPIRIN 81 MG/1
81 TABLET ORAL DAILY
Status: DISCONTINUED | OUTPATIENT
Start: 2022-03-22 | End: 2022-03-25

## 2022-03-22 RX ORDER — TRAMADOL HYDROCHLORIDE 50 MG/1
50 TABLET ORAL
Status: DISCONTINUED | OUTPATIENT
Start: 2022-03-22 | End: 2022-03-29 | Stop reason: HOSPADM

## 2022-03-22 RX ORDER — LISINOPRIL 20 MG/1
10 TABLET ORAL DAILY
Status: DISCONTINUED | OUTPATIENT
Start: 2022-03-22 | End: 2022-03-23

## 2022-03-22 RX ADMIN — PHYTONADIONE 10 MG: 5 TABLET ORAL at 00:00

## 2022-03-22 RX ADMIN — HYDRALAZINE HYDROCHLORIDE 10 MG: 20 INJECTION, SOLUTION INTRAMUSCULAR; INTRAVENOUS at 23:33

## 2022-03-22 RX ADMIN — TUBERCULIN PURIFIED PROTEIN DERIVATIVE 5 UNITS: 5 INJECTION, SOLUTION INTRADERMAL at 09:38

## 2022-03-22 RX ADMIN — ASPIRIN 81 MG: 81 TABLET ORAL at 08:48

## 2022-03-22 RX ADMIN — METHOCARBAMOL TABLETS 500 MG: 500 TABLET, COATED ORAL at 15:01

## 2022-03-22 RX ADMIN — METHOCARBAMOL TABLETS 500 MG: 500 TABLET, COATED ORAL at 22:06

## 2022-03-22 RX ADMIN — GABAPENTIN 100 MG: 100 CAPSULE ORAL at 17:16

## 2022-03-22 RX ADMIN — METHOCARBAMOL TABLETS 500 MG: 500 TABLET, COATED ORAL at 09:38

## 2022-03-22 RX ADMIN — TRAMADOL HYDROCHLORIDE 50 MG: 50 TABLET, COATED ORAL at 15:01

## 2022-03-22 RX ADMIN — POTASSIUM CHLORIDE 40 MEQ: 20 TABLET, EXTENDED RELEASE ORAL at 08:48

## 2022-03-22 RX ADMIN — SODIUM CHLORIDE, PRESERVATIVE FREE 5 ML: 5 INJECTION INTRAVENOUS at 22:22

## 2022-03-22 RX ADMIN — AMLODIPINE BESYLATE 10 MG: 10 TABLET ORAL at 22:06

## 2022-03-22 RX ADMIN — SODIUM CHLORIDE, PRESERVATIVE FREE 10 ML: 5 INJECTION INTRAVENOUS at 05:20

## 2022-03-22 RX ADMIN — LISINOPRIL 10 MG: 20 TABLET ORAL at 08:48

## 2022-03-22 RX ADMIN — HYDRALAZINE HYDROCHLORIDE 10 MG: 20 INJECTION, SOLUTION INTRAMUSCULAR; INTRAVENOUS at 17:14

## 2022-03-22 RX ADMIN — HYDROMORPHONE HYDROCHLORIDE 2 MG: 2 TABLET ORAL at 06:09

## 2022-03-22 RX ADMIN — SODIUM CHLORIDE, PRESERVATIVE FREE 10 ML: 5 INJECTION INTRAVENOUS at 00:00

## 2022-03-22 RX ADMIN — METOPROLOL SUCCINATE 12.5 MG: 25 TABLET, EXTENDED RELEASE ORAL at 08:48

## 2022-03-22 RX ADMIN — DEXTROSE MONOHYDRATE AND SODIUM CHLORIDE 75 ML/HR: 5; .45 INJECTION, SOLUTION INTRAVENOUS at 12:25

## 2022-03-22 RX ADMIN — GABAPENTIN 100 MG: 100 CAPSULE ORAL at 08:48

## 2022-03-22 NOTE — H&P
[facility] Cardiology Initial Cardiac Evaluation                    Date of  Admission: 3/21/2022  7:35 PM     Primary Care Physician:  Dr. Kadi Mendiola  Primary Cardiologist:  Dr. Logan Anguiano   Referring Physician:  Dr. Capo Trivedi  Attending Physician:  Dr. Logan Anguiano    CC/Reason for consult:  Pre op, hx CAD and A fib       Rita Hernandez is a [de-identified] y.o. female with PMH of CAD ([facility] 4/2021 PCI to mLAD), persistent Afib (coumadin), HTN, anemia, and JOSELINE, who presented to the ED after a fall at home. She was also in the ED on 3-15 after a fall where she was found to have several compression fractures and she was scheduled for surgery on Friday, 3-. However, after the second fall and difficulty controlling pain the decision was made to admit until surgery. Patient underwent LHC in April of 2021 with PCI to mLAD. Plavix was stopped 3/18/2022. She remained on coumadin and was instructed to start ASA as well. On admission ASA was continued and coumadin was held for surgery. EKG shows SR. Patient denies any anginal symptoms. Past Medical History:   Diagnosis Date    Arrhythmia     Benign essential HTN     managed with med    CAD (coronary artery disease) 04/2021    cardiac stent distal LAD, PTCA    Macrocytic anemia 2019    Persistent atrial fibrillation (HCC)     Restless legs     Sleep apnea     no cpap, dental device from sleep dentist      Past Surgical History:   Procedure Laterality Date    HX APPENDECTOMY  age 9   Manny Due WRIST FRACTURE TX Left 07/23/2020    DC CARDIAC SURG PROCEDURE UNLIST  04/2021    card.  cath, LAURENCE distal LAD, PTCA     No Known Allergies   Family History   Problem Relation Age of Onset    Cancer Brother     Alcohol abuse Brother         Current Facility-Administered Medications   Medication Dose Route Frequency    aspirin delayed-release tablet 81 mg  81 mg Oral DAILY    gabapentin (NEURONTIN) capsule 100 mg  100 mg Oral BID    lisinopriL (PRINIVIL, ZESTRIL) tablet 10 mg  10 mg Oral DAILY    methocarbamoL (ROBAXIN) tablet 500 mg  500 mg Oral TID    metoprolol succinate (TOPROL-XL) XL tablet 12.5 mg  12.5 mg Oral DAILY    traMADoL (ULTRAM) tablet 50 mg  50 mg Oral Q6H PRN    tuberculin injection 5 Units  5 Units IntraDERMal ONCE    dextrose 5 % - 0.45% NaCl infusion  75 mL/hr IntraVENous CONTINUOUS    sodium chloride (NS) flush 5-40 mL  5-40 mL IntraVENous Q8H    sodium chloride (NS) flush 5-40 mL  5-40 mL IntraVENous PRN    acetaminophen (TYLENOL) tablet 650 mg  650 mg Oral Q6H PRN    Or    acetaminophen (TYLENOL) suppository 650 mg  650 mg Rectal Q6H PRN    polyethylene glycol (MIRALAX) packet 17 g  17 g Oral DAILY PRN    ondansetron (ZOFRAN ODT) tablet 4 mg  4 mg Oral Q8H PRN    Or    ondansetron (ZOFRAN) injection 4 mg  4 mg IntraVENous Q6H PRN    HYDROmorphone (DILAUDID) injection 0.5 mg  0.5 mg IntraVENous Q3H PRN    HYDROmorphone (DILAUDID) tablet 2 mg  2 mg Oral Q4H PRN       Review of Systems   Constitutional: Negative. HENT: Negative. Eyes: Negative. Respiratory: Negative. Cardiovascular: Negative. Gastrointestinal: Negative. Genitourinary: Negative. Musculoskeletal: Positive for back pain, falls and joint pain. Skin: Negative. Neurological: Negative. Endo/Heme/Allergies: Negative. Psychiatric/Behavioral: Negative. Physical Exam  Vitals:    03/21/22 2117 03/21/22 2156 03/22/22 0423 03/22/22 0738   BP: 125/68 (!) 173/58 135/68 (!) 155/75   Pulse: 66 (!) 57 60 (!) 59   Resp: 18  18 18   Temp: 98.1 °F (36.7 °C) 97.7 °F (36.5 °C) 98.3 °F (36.8 °C) 97.7 °F (36.5 °C)   SpO2: 95% 98% 98% 95%   Weight:       Height:           Physical Exam:  Physical Exam  Eyes:      Pupils: Pupils are equal, round, and reactive to light. Cardiovascular:      Rate and Rhythm: Normal rate and regular rhythm. Pulmonary:      Effort: Pulmonary effort is normal.      Breath sounds: Normal breath sounds.    Abdominal:      General: Bowel sounds are normal.   Musculoskeletal:         General: Signs of injury present. Lumbar back: Tenderness present. Skin:     General: Skin is warm and dry. Neurological:      General: No focal deficit present. Mental Status: She is alert and oriented to person, place, and time. Psychiatric:         Mood and Affect: Mood normal.         Behavior: Behavior normal.         Thought Content: Thought content normal.         Judgment: Judgment normal.         Cardiographics      ECG: normal sinus rhythm  Echocardiogram:  7/2021  · LV: Estimated LVEF is 50 - 55%. Normal cavity size, wall thickness and systolic function (ejection fraction normal). · LA: Moderately dilated left atrium. The left atrial appendage with interrogated thoroughly with 2-D, X-plane and 3-D imaging. . No spontaneous echo contrast  · RA: Mildly dilated right atrium. · MV: Mild mitral valve regurgitation is present. · Succesful cardioversion of persistent atrial fibrillation into NSR           Labs:   Recent Labs     03/21/22 2007      K 3.4*   MG 2.1   BUN 20   CREA 1.10*   *   WBC 10.6   HGB 10.9*   HCT 33.1*      INR 7.5*        Assessment/Plan:     Assessment:      Principal Problem:    Closed compression fracture of lumbosacral spine   -- per ortho, surgery planned for 3/22/2021    Active Problems:        Longstanding persistent atrial fibrillation   -- currently in NSR  -- coumadin on hold for surgery, resume ASAP when ok from surgery standpoint  -- monitor on telemetry       CAD (coronary artery disease)  -- No anginal symptoms, EKG showed SR  -- no further cardiac work up prior to surgery  -- continue ASA, BB and ACE      HTN (hypertension)   -- continue home meds, monitor BP and titrate as needed. Suspected sleep apnea       Thank you very much for this referral. We appreciate the opportunity to participate in this patient's care. We will follow along with above stated plan.     Hipolito Tadeo NP  Attending MD: David Taylor    ATTENDING ADDENDUM:    Patient seen and examined by me. I performed the H&P on my own. I agree with above note by physician extender. Key findings are:  No CP or MCKEON, no palpitations or heart failure, but frequent falls resulting in compression fractures and severe pain. Maintaining sinus rhythm since admission despite pain. Compliant with medications. CV- RRR without murmur, no gallop, no JVD at 30 degrees  Lungs- Clear bilaterally anterolaterally, mildly decreased bibasilar but no crackles or wheezing  Abd- soft, nontender, nondistended  Ext- no distal edema    Assessment and plan:    Fall with compression fracture-bedrest with pain control, advance activity and pain medicine as needed per orthopedic direction. Recheck BMP, CBC, magnesium in the morning. See below. Acceptable risk to proceed with back surgery later this week, see below recommendations    Paroxysmal/longstanding persistent A. Fib-status post cardioversion, maintaining sinus rhythm. Placed on remote telemetry, continue current medications but hold Coumadin for surgery. Control pain postoperatively. Avoid excessive volume overload perioperatively if possible. Hypertension-stable, continue meds and titrate as needed    Chronic anticoagulation-on Coumadin and Plavix with history of atrial fibrillation and recent percutaneous intervention. Holding Plavix for surgery, start low-dose aspirin and continue this through surgery if okay with other physicians. Holding Coumadin for surgery, resume Coumadin postoperatively when okay with orthopedics from a postoperative bleeding standpoint. Intervention of the RCA was April 22 of last year. Okay to just stay off Plavix and continue Coumadin and low-dose aspirin postoperatively if continues to do well from a coronary standpoint.         Purvi Fletcher MD  [facility] Cardiology  Pager [phone]

## 2022-03-22 NOTE — PROGRESS NOTES
Problem: Falls - Risk of  Goal: *Absence of Falls  Description: Document Gus Arango Fall Risk and appropriate interventions in the flowsheet.   Outcome: Progressing Towards Goal  Note: Fall Risk Interventions:  Mobility Interventions: Bed/chair exit alarm,OT consult for ADLs,Patient to call before getting OOB,PT Consult for mobility concerns              Elimination Interventions: Call light in reach,Patient to call for help with toileting needs,Toileting schedule/hourly rounds    History of Falls Interventions: Bed/chair exit alarm,Consult care management for discharge planning,Door open when patient unattended         Problem: Patient Education: Go to Patient Education Activity  Goal: Patient/Family Education  Outcome: Progressing Towards Goal     Problem: Pain  Goal: *Control of Pain  Outcome: Progressing Towards Goal     Problem: Patient Education: Go to Patient Education Activity  Goal: Patient/Family Education  Outcome: Progressing Towards Goal

## 2022-03-22 NOTE — H&P
[facility]      Patient ID:  Name: Marybel Giles  MRN: [id number]  AGE: [de-identified] y.o.  : 1940    Date of Admission: 2022    Subjective: Pt complains of right sided low back pain that has been going on since February. She reports pain radiating down the right side. She was seen by Dr. Devin Braxton on 3/11/22 for this and an MRI was ordered and she was placed on tramadol. .  They presented to the [facility] Emergency Dept on 3/15/22 after a fall and had right sided rib pain and bilateral hip pain. She was found to have several compression fractures in her lumbar spine that were existing. They discussed with Dr. Devin Braxton and he recommended surgery for the compression fractures. She fell again yesterday and as surgery was scheduled this Friday. Dr. Murdock Screws office recommended she be admitted for pain control until her surgery. They presented to the ER last night with low back pain that was 9/10 on the pain scale. She denies any other injuries. They have no other orthopedic concerns at this time. Past Medical History Includes:   Past Medical History:   Diagnosis Date    Arrhythmia     Benign essential HTN     managed with med    CAD (coronary artery disease) 2021    cardiac stent distal LAD, PTCA    Macrocytic anemia     Persistent atrial fibrillation (HCC)     Restless legs     Sleep apnea     no cpap, dental device from sleep dentist   ,   Past Surgical History:   Procedure Laterality Date    HX APPENDECTOMY  age 9   [facility] WRIST FRACTURE TX Left 2020    [place] CARDIAC SURG PROCEDURE UNLIST  2021    card.  cath, LAURENCE distal LAD, PTCA     Family History:   Family History   Problem Relation Age of Onset    Cancer Brother     Alcohol abuse Brother       Social History:   Social History     Tobacco Use    Smoking status: Never Smoker    Smokeless tobacco: Never Used    Tobacco comment: heavy secondhand exposure Substance Use Topics    Alcohol use: Never       ALLERGIES: No Known Allergies     Patient Medications    Current Facility-Administered Medications   Medication Dose Route Frequency    aspirin delayed-release tablet 81 mg  81 mg Oral DAILY    gabapentin (NEURONTIN) capsule 100 mg  100 mg Oral BID    lisinopriL (PRINIVIL, ZESTRIL) tablet 10 mg  10 mg Oral DAILY    methocarbamoL (ROBAXIN) tablet 500 mg  500 mg Oral TID    metoprolol succinate (TOPROL-XL) XL tablet 12.5 mg  12.5 mg Oral DAILY    traMADoL (ULTRAM) tablet 50 mg  50 mg Oral Q6H PRN    dextrose 5 % - 0.45% NaCl infusion  75 mL/hr IntraVENous CONTINUOUS    sodium chloride (NS) flush 5-40 mL  5-40 mL IntraVENous Q8H    sodium chloride (NS) flush 5-40 mL  5-40 mL IntraVENous PRN    acetaminophen (TYLENOL) tablet 650 mg  650 mg Oral Q6H PRN    Or    acetaminophen (TYLENOL) suppository 650 mg  650 mg Rectal Q6H PRN    polyethylene glycol (MIRALAX) packet 17 g  17 g Oral DAILY PRN    ondansetron (ZOFRAN ODT) tablet 4 mg  4 mg Oral Q8H PRN    Or    ondansetron (ZOFRAN) injection 4 mg  4 mg IntraVENous Q6H PRN    HYDROmorphone (DILAUDID) injection 0.5 mg  0.5 mg IntraVENous Q3H PRN    HYDROmorphone (DILAUDID) tablet 2 mg  2 mg Oral Q4H PRN         Review of Systems:  A comprehensive review of systems was negative except for that written in the HPI. Physical Exam:      General: NAD, Alert, Oriented x 3   Mental Status: Appropriate   Psych: Normal Affect, Normal Mood    HEENT: Normal Cephalic/Atraumatic, PERRL   Lungs: Respirations even and unlabored, Breath Sounds were clear, no respiratory distress   Heart: Regular Rate and Rhythm   Vascular: Distal pulses intact, good capillary refill   Skin: No redness, No Rashes, Skin is dry   Musculoskeletal: Good normal ROM of both hips with no pain on ROM of either hip. No distal edema. Calves are soft and nontender. Sensation equal bilaterally. Normal range of motion of both ankles.   Lymphatic: No lympahdenopathy, No distal edema   Neuro: No gross deficits   Abdomen: Soft, Non tender, No distension      VITALS:   Patient Vitals for the past 8 hrs:   BP Temp Pulse Resp SpO2   22 0738 (!) 155/75 97.7 °F (36.5 °C) (!) 59 18 95 %   22 0423 135/68 98.3 °F (36.8 °C) 60 18 98 %    , Temp (24hrs), Av.9 °F (36.6 °C), Min:97.7 °F (36.5 °C), Max:98.3 °F (36.8 °C)           Diagnosis   Patient Active Problem List   Diagnosis Code    Suspected sleep apnea R29.818    Dyspnea on exertion R06.00    Chronic cough R05.3    RLS (restless legs syndrome) G25.81    Longstanding persistent atrial fibrillation ([facility]) I48.11    Abnormal nuclear cardiac imaging test R93.1    Chronic fatigue R53.82    Bradycardia R00.1    Opioid use, unspecified with unspecified opioid-induced disorder F11.99    Closed compression fracture of lumbosacral spine ([facility]) S32.000A          Assessment     Acute lumbar compression fractures: MRI 3/16 results;  1. Acute to subacute compression fractures at L3 and L4.  2. Remote compression fractures of T12 and L1.  3. Multilevel lumbar spondylosis. Medical Decision Making:     MRI and chart have been reviewed. The patient was discussed with Dr. Celia Diaz. We have admitted for pain control and in anticipation of L3/L4 kyphoplasty. I have consulted the hospitalist for medical clearance and they have consulted cardiology for cardiology clearance. Anticoagulants on hold for impending surgery. Pain control. Somewhat improved from last night. She rates 6/10. She will be NPO Friday after midnight. She may ambulate with assistance for transfers as tolerated.          DENIS Tiwari  3/22/2022,  9:16 AM

## 2022-03-22 NOTE — PROGRESS NOTES
Spiritual Care Visit. Initial visit. Patient was visited by [facility]. Entered by Mathieu Og, Staff .  Ric, Sunday

## 2022-03-22 NOTE — PROGRESS NOTES
Spiritual Care Visit. Initial visit. Patient was visited by [facility]. Entered by Leslie Cruz Alliance, Staff .  Ric, Sunday

## 2022-03-22 NOTE — PROGRESS NOTES
Pt is an [de-identified] yo female admitted for pain control prior to scheduled back  surgery on Friday (surgery possibly will be moved up to Wednesday). SW met with pt/dtr to discuss dc planning. Demographics, insurance and PCP confirmed. Pt/dtr want the pt to go to STR [place]. SW provided them with a list of area facilities to review and provide choices. PPD placed today. The appropriate COVID test will be ordered once the final surgery date is determined. SW following to facilitate pt's transfer to rehab at [place]. Care Management Interventions  PCP Verified by CM:  Yes  Mode of Transport at Discharge: BLS  Transition of Care Consult (CM Consult): Discharge Planning  Discharge Durable Medical Equipment: No (has walker, cane and BSC at home)  Physical Therapy Consult: Yes (will be ordered post-op)  Occupational Therapy Consult: Yes (will be ordered post-op)  Speech Therapy Consult: No  Support Systems: Child(vikram),Other Family Member(s),Other (Comment) (lives in [facility] (lives independently-not an half-way))  Confirm Follow Up Transport: Family  The Plan for Transition of Care is Related to the Following Treatment Goals : STR to improve pt's strength and functional abilities for a safe transition to home  The Patient and/or Patient Representative was Provided with a Choice of Provider and Agrees with the Discharge Plan?: Yes  Name of the Patient Representative Who was Provided with a Choice of Provider and Agrees with the Discharge Plan: Miriam/dtr  Freedom of Choice List was Provided with Basic Dialogue that Supports the Patient's Individualized Plan of Care/Goals, Treatment Preferences and Shares the Quality Data Associated with the Providers?: Yes  Discharge Location  Patient Expects to be Discharged to[de-identified] Rehab Unit Subacute

## 2022-03-22 NOTE — PROGRESS NOTES
Problem: Falls - Risk of  Goal: *Absence of Falls  Description: Document Adriana Evans Fall Risk and appropriate interventions in the flowsheet.   Outcome: Progressing Towards Goal  Note: Fall Risk Interventions:  Mobility Interventions: Bed/chair exit alarm,Communicate number of staff needed for ambulation/transfer,PT Consult for mobility concerns    History of Falls Interventions: Bed/chair exit alarm,Room close to nurse's station     Problem: Patient Education: Go to Patient Education Activity  Goal: Patient/Family Education  Outcome: Progressing Towards Goal     Problem: Pain  Goal: *Control of Pain  Outcome: Progressing Towards Goal     Problem: Patient Education: Go to Patient Education Activity  Goal: Patient/Family Education  Outcome: Progressing Towards Goal

## 2022-03-22 NOTE — CONSULTS
[facility] Hospitalist Consult   Admit Date:  3/21/2022  7:35 PM   Name:  Jerilyn Bear   Age:  [de-identified] y.o. Sex:  female  :  1940   MRN:  [id number]   Room:  [facility]    Presenting Complaint: Fall    Reason(s) for Admission: Closed compression fracture of lumbosacral spine ([facility] [address] [S32.000A]     Hospitalists consulted by Pramod Hough MD for: Medical management    History of Presenting Illness:   Jerilyn Bear is a [de-identified] y.o. female with history of coronary artery disease status post stenting in 2021, persistent atrial fibrillation on Coumadin, hypertension, anemia, obstructive sleep apnea presented to the ER on 3/15 after a fall where she was found to have several compression fractures. She was scheduled for surgery on Friday, 3/25/2022. However she fell again and therefore was having significantly worsening pain. She was admitted to Orthopedics service for further evaluation and management. Hospitalist consulted for preop clearance. I saw and evaluated the patient at bedside today. She reports having back pain. No fever or chills. No chest pain no shortness of breath. No bleeding. Review of Systems:  10 systems reviewed and negative except as noted in HPI. Assessment & Plan:   Principal Problem: This is a [de-identified] y female with:      Closed compression fracture of lumbosacral spine ([address] (3/21/2022)  Per primary orthopedics. Plan for surgical fixation on 3/25. Cardiology consulted for preop clearance as patient had coronary artery disease with stents placed in 2021. Coronary artery disease  Cardiology on board. Continue aspirin, beta-blocker, ACE inhibitor. Plavix held. Longstanding persistent atrial fibrillation  Coumadin on hold. INR is 3.7 this am.   Resume ASAP when okay from surgical standpoint per cardiology. Continue metoprolol. Hypertension  Continue home medications. Obstructive sleep apnea:     Thank you for allowing us the pleasure of participating in the care of this patient. Hospital Problems as of 3/22/2022 Date Reviewed: 3/11/2022          Codes Class Noted - Resolved POA    CAD (coronary artery disease) ICD-10-CM: I25.10  ICD-9-CM: 414.00  3/22/2022 - Present Yes        HTN (hypertension) ICD-10-CM: I10  ICD-9-CM: 401.9  3/22/2022 - Present Yes        * (Principal) Closed compression fracture of lumbosacral spine ([facility] [address] ICD-10-CM: S32.000A  ICD-9-CM: 805.4  3/21/2022 - Present Yes        Longstanding persistent atrial fibrillation ([facility] [address] ICD-10-CM: I48.11  ICD-9-CM: 427.31  3/8/2021 - Present Yes        Suspected sleep apnea ICD-10-CM: R29.818  ICD-9-CM: 781.99  2/11/2021 - Present Yes              Past History:  Past Medical History:   Diagnosis Date    Arrhythmia     Benign essential HTN     managed with med    CAD (coronary artery disease) 04/2021    cardiac stent distal LAD, PTCA    Macrocytic anemia 2019    Persistent atrial fibrillation (HCC)     Restless legs     Sleep apnea     no cpap, dental device from sleep dentist      Past Surgical History:   Procedure Laterality Date    HX APPENDECTOMY  age 10    HX WRIST FRACTURE TX Left 07/23/2020    [place] CARDIAC SURG PROCEDURE UNLIST  04/2021    card. cath, LAURENCE distal LAD, PTCA      No Known Allergies   Social History     Tobacco Use    Smoking status: Never Smoker    Smokeless tobacco: Never Used    Tobacco comment: heavy secondhand exposure   Substance Use Topics    Alcohol use: Never      Family History   Problem Relation Age of Onset    Cancer Brother     Alcohol abuse Brother       Family history reviewed and negative except as otherwise noted.     Immunization History   Administered Date(s) Administered    TB Skin Test (PPD) Intradermal 03/22/2022     Current Facility-Administered Medications   Medication Dose Route Frequency    aspirin delayed-release tablet 81 mg  81 mg Oral DAILY    gabapentin (NEURONTIN) capsule 100 mg  100 mg Oral BID    lisinopriL (PRINIVIL, ZESTRIL) tablet 10 mg  10 mg Oral DAILY    methocarbamoL (ROBAXIN) tablet 500 mg  500 mg Oral TID    metoprolol succinate (TOPROL-XL) XL tablet 12.5 mg  12.5 mg Oral DAILY    traMADoL (ULTRAM) tablet 50 mg  50 mg Oral Q6H PRN    tuberculin injection 5 Units  5 Units IntraDERMal ONCE    dextrose 5 % - 0.45% NaCl infusion  75 mL/hr IntraVENous CONTINUOUS    sodium chloride (NS) flush 5-40 mL  5-40 mL IntraVENous Q8H    sodium chloride (NS) flush 5-40 mL  5-40 mL IntraVENous PRN    acetaminophen (TYLENOL) tablet 650 mg  650 mg Oral Q6H PRN    Or    acetaminophen (TYLENOL) suppository 650 mg  650 mg Rectal Q6H PRN    polyethylene glycol (MIRALAX) packet 17 g  17 g Oral DAILY PRN    ondansetron (ZOFRAN ODT) tablet 4 mg  4 mg Oral Q8H PRN    Or    ondansetron (ZOFRAN) injection 4 mg  4 mg IntraVENous Q6H PRN    HYDROmorphone (DILAUDID) injection 0.5 mg  0.5 mg IntraVENous Q3H PRN    HYDROmorphone (DILAUDID) tablet 2 mg  2 mg Oral Q4H PRN       Objective:     Patient Vitals for the past 24 hrs:   Temp Pulse Resp BP SpO2   03/22/22 1116 97.7 °F (36.5 °C) 62 17 (!) 170/67 97 %   03/22/22 0738 97.7 °F (36.5 °C) (!) 59 18 (!) 155/75 95 %   03/22/22 0423 98.3 °F (36.8 °C) 60 18 135/68 98 %   03/21/22 2156 97.7 °F (36.5 °C) (!) 57  (!) 173/58 98 %   03/21/22 2117 98.1 °F (36.7 °C) 66 18 125/68 95 %   03/21/22 2008  64 17  95 %   03/21/22 1955    127/66 93 %   03/21/22 1938 97.9 °F (36.6 °C) 66 18 (!) 149/71 93 %     Oxygen Therapy  O2 Sat (%): 97 % (03/22/22 1116)  Pulse via Oximetry: 63 beats per minute (03/21/22 2008)  O2 Device: None (Room air) (03/21/22 2117)    Estimated body mass index is 25.75 kg/m² as calculated from the following:    Height as of this encounter: 5' 4\" (1.626 m). Weight as of this encounter: 68 kg (150 lb).     Intake/Output Summary (Last 24 hours) at 3/22/2022 1430  Last data filed at 3/22/2022 1225  Gross per 24 hour   Intake 340 ml   Output 750 ml   Net -410 ml Physical Exam:    Blood pressure (!) 170/67, pulse 62, temperature 97.7 °F (36.5 °C), resp. rate 17, height 5' 4\" (1.626 m), weight 68 kg (150 lb), SpO2 97 %. General:    Well nourished. No overt distress  Head:  Normocephalic, atraumatic  Eyes:  Sclerae appear normal.  Pupils equally round. ENT:  Nares appear normal, no drainage. Moist oral mucosa  Neck:  No restricted ROM. Trachea midline   CV:   RRR. No m/r/g. No jugular venous distension. Lungs:   CTAB. No wheezing, rhonchi, or rales. Respirations even, unlabored  Abdomen: Bowel sounds present. Soft, nontender, nondistended. Tenderness in lower back,   Extremities: No cyanosis or clubbing. No edema  Skin:     No rashes and normal coloration. Warm and dry. Neuro:  CN II-XII grossly intact. Sensation intact. A&Ox3  Psych:  Normal mood and affect. I have reviewed ordered lab tests and independently visualized imaging below:    Recent Labs:  Recent Results (from the past 48 hour(s))   EKG, 12 LEAD, INITIAL    Collection Time: 03/21/22  7:59 PM   Result Value Ref Range    Ventricular Rate 64 BPM    Atrial Rate 65 BPM    P-R Interval 198 ms    QRS Duration 116 ms    Q-T Interval 428 ms    QTC Calculation (Bezet) 442 ms    Calculated P Axis 18 degrees    Calculated R Axis -20 degrees    Calculated T Axis 15 degrees    Diagnosis       Normal sinus rhythm  Nonspecific T wave abnormality  Abnormal ECG  When compared with ECG of 22 APR 2021 12:32:40.   Premature atrial complexes is no longer Present  Confirmed by Gatito Chavez ([id number]) on 3/22/2022 6:29:20 AM     CBC WITH AUTOMATED DIFF    Collection Time: 03/21/22  8:07 PM   Result Value Ref Range    WBC 10.6 4.3 - 11.1 K/uL    RBC 3.37 (L) 4.05 - 5.2 M/uL    HGB 10.9 (L) 11.7 - 15.4 g/dL    HCT 33.1 (L) 35.8 - 46.3 %    MCV 98.2 (H) 79.6 - 97.8 FL    MCH 32.3 26.1 - 32.9 PG    MCHC 32.9 31.4 - 35.0 g/dL    RDW 13.1 11.9 - 14.6 %    PLATELET 895 190 - 742 K/uL    MPV 10.4 9.4 - 12.3 FL ABSOLUTE NRBC 0.00 0.0 - 0.2 K/uL    DF AUTOMATED      NEUTROPHILS 72 43 - 78 %    LYMPHOCYTES 18 13 - 44 %    MONOCYTES 7 4.0 - 12.0 %    EOSINOPHILS 2 0.5 - 7.8 %    BASOPHILS 1 0.0 - 2.0 %    IMMATURE GRANULOCYTES 0 0.0 - 5.0 %    ABS. NEUTROPHILS 7.6 1.7 - 8.2 K/UL    ABS. LYMPHOCYTES 1.9 0.5 - 4.6 K/UL    ABS. MONOCYTES 0.8 0.1 - 1.3 K/UL    ABS. EOSINOPHILS 0.2 0.0 - 0.8 K/UL    ABS. BASOPHILS 0.1 0.0 - 0.2 K/UL    ABS. IMM. GRANS. 0.0 0.0 - 0.5 K/UL   PROTHROMBIN TIME + INR    Collection Time: 03/21/22  8:07 PM   Result Value Ref Range    Prothrombin time 64.2 (H) 12.6 - 14.5 sec    INR 7.5 (HH)     METABOLIC PANEL, COMPREHENSIVE    Collection Time: 03/21/22  8:07 PM   Result Value Ref Range    Sodium 140 136 - 145 mmol/L    Potassium 3.4 (L) 3.5 - 5.1 mmol/L    Chloride 105 98 - 107 mmol/L    CO2 29 21 - 32 mmol/L    Anion gap 6 (L) 7 - 16 mmol/L    Glucose 135 (H) 65 - 100 mg/dL    BUN 20 8 - 23 MG/DL    Creatinine 1.10 (H) 0.6 - 1.0 MG/DL    GFR est AA >60 >60 ml/min/1.73m2    GFR est non-AA 51 (L) >60 ml/min/1.73m2    Calcium 8.8 8.3 - 10.4 MG/DL    Bilirubin, total 0.4 0.2 - 1.1 MG/DL    ALT (SGPT) 23 12 - 65 U/L    AST (SGOT) 31 15 - 37 U/L    Alk.  phosphatase 114 50 - 136 U/L    Protein, total 6.4 6.3 - 8.2 g/dL    Albumin 2.9 (L) 3.2 - 4.6 g/dL    Globulin 3.5 2.3 - 3.5 g/dL    A-G Ratio 0.8 (L) 1.2 - 3.5     MAGNESIUM    Collection Time: 03/21/22  8:07 PM   Result Value Ref Range    Magnesium 2.1 1.8 - 2.4 mg/dL   URINALYSIS W/ RFLX MICROSCOPIC    Collection Time: 03/22/22 12:05 AM   Result Value Ref Range    Color YELLOW      Appearance CLEAR      Specific gravity 1.016 1.001 - 1.023      pH (UA) 5.5 5.0 - 9.0      Protein Negative NEG mg/dL    Glucose Negative mg/dL    Ketone Negative NEG mg/dL    Bilirubin Negative NEG      Blood Negative NEG      Urobilinogen 1.0 0.2 - 1.0 EU/dL    Nitrites Negative NEG      Leukocyte Esterase TRACE (A) NEG      WBC 3-5 0 /hpf    RBC 0-3 0 /hpf    Epithelial cells 0-3 0 /hpf    Bacteria 0 0 /hpf    Casts 0 0 /lpf   PROTHROMBIN TIME + INR    Collection Time: 03/22/22  9:58 AM   Result Value Ref Range    Prothrombin time 37.0 (H) 12.6 - 14.5 sec    INR 3.7         All Micro Results     None          Other Studies:  CT HEAD WO CONT    Result Date: 3/21/2022  Exam: CT HEAD WO CONT on 3/21/2022 8:25 PM Clinical History: The Female patient is [de-identified]years old  presenting for pain following fall. Technique: Thin slice axial CT images through the brain were obtained. All CT scans at this facility are performed using dose reduction/dose modulation techniques, as appropriate the performed exam, including the following: Automated Exposure Control; Adjustment of the mA and/or kV according to patient size (this includes techniques or standardized protocols for targeted exams where dose is matched to indication/reason for exam); and Use of Iterative Reconstruction Technique. Radiation Exposure Indices: Reference Air Kerma (Ka,r) = 969 mGy-cm Comparison:  969. Findings:  Cerebrum: Age-related senescent changes are seen with sulcal and ventricular prominence. . There is mild chronic periventricular white matter disease with few lacunae throughout the basal ganglia. No evidence of intracranial hemorrhage, mass, or other space-occupying lesion is seen. There are no abnormal extra-axial fluid collections. Cerebellum: Involutional changes are demonstrated. CSF spaces: The ventricular system is within normal limits. The basilar cisterns are unremarkable. Brainstem: No evidence of ischemia, hemorrhage, or mass. Extracranial tissues: Visualized orbits and extracranial soft tissues are unremarkable. Paranasal sinuses/Mastoids: Well-pneumatized and aerated. . Calvarium: No acute osseous abnormality. 1.  No acute intracranial abnormality. CPT code 24901     CT SPINE CERV WO CONT    Result Date: 3/21/2022  Exam: CT SPINE CERV WO CONT on 3/21/2022 8:37 PM Clinical History:  The Female patient is [de-identified] years old  presenting for pain following fall. Technique: Thin section axial CT images were obtained through the entire cervical spine. To optimally assess the base of the dens and C2 vertebra, coronal multiplanar reformatted images were created. To optimally assess vertebral alignment and prevertebral soft tissues as well as spinous processes and posterior elements, sagittal multiplanar reformatted images were created from the original axial data. The axial and reformatted data was available for review. All CT scans at this facility are performed using dose reduction/dose modulation techniques, as appropriate the performed exam, including the following: Automated Exposure Control; Adjustment of the mA and/or kV according to patient size (this includes techniques or standardized protocols for targeted exams where dose is matched to indication/reason for exam); and Use of Iterative Reconstruction Technique. Total radiation dose 969 mGy-cm. Comparison: None. FINDINGS: The craniocervical junction is unremarkable. There is loss of cervical lordosis however with normal alignment maintained. Moderate to severe disc height loss is demonstrated at C5/6 and C6/7 with minimal ventral endplate spondylosis. There is mild associated facet arthropathy. The visualized paraspinous soft tissues are unremarkable. Please note that cord or ligamentous injury cannot be excluded on the basis of this exam.     1. Loss of cervical lordosis with chronic degenerative changes. CPT code(s) 01742       Signed:  Afshin Wagner MD    Part of this note may have been written by using a voice dictation software. The note has been proof read but may still contain some grammatical/other typographical errors.

## 2022-03-22 NOTE — PROGRESS NOTES
03/21/22 2213   Dual Skin Pressure Injury Assessment   Dual Skin Pressure Injury Assessment X   Second Care Provider (Based on [address] [place] [address]) Radha Barahona RN   Skin Integumentary   Skin Integumentary (WDL) X    Pressure  Injury Documentation No Pressure Injury Noted-Pressure Ulcer Prevention Initiated   Skin Integrity Abrasion; Intact  (bruising midline back, L buttocks)   Skin Color Appropriate for ethnicity   Skin Condition/Temp Warm;Dry   Varicosities Present   Wound Prevention and Protection Methods   Orientation of Wound Prevention Posterior   Location of Wound Prevention Sacrum/Coccyx   Dressing Present  No   Wound Offloading (Prevention Methods) Bed, pressure reduction mattress;Pillows;Repositioning

## 2022-03-23 LAB
ANION GAP SERPL CALC-SCNC: 3 MMOL/L (ref 7–16)
BUN SERPL-MCNC: 9 MG/DL (ref 8–23)
CALCIUM SERPL-MCNC: 9.2 MG/DL (ref 8.3–10.4)
CHLORIDE SERPL-SCNC: 107 MMOL/L (ref 98–107)
CO2 SERPL-SCNC: 31 MMOL/L (ref 21–32)
CREAT SERPL-MCNC: 0.8 MG/DL (ref 0.6–1)
GLUCOSE SERPL-MCNC: 129 MG/DL (ref 65–100)
INR PPP: 1.4
POTASSIUM SERPL-SCNC: 3.4 MMOL/L (ref 3.5–5.1)
PROTHROMBIN TIME: 17.6 SEC (ref 12.6–14.5)
SODIUM SERPL-SCNC: 141 MMOL/L (ref 136–145)

## 2022-03-23 PROCEDURE — 74011250637 HC RX REV CODE- 250/637: Performed by: PHYSICIAN ASSISTANT

## 2022-03-23 PROCEDURE — 74011000250 HC RX REV CODE- 250: Performed by: PHYSICIAN ASSISTANT

## 2022-03-23 PROCEDURE — 85610 PROTHROMBIN TIME: CPT

## 2022-03-23 PROCEDURE — 36415 COLL VENOUS BLD VENIPUNCTURE: CPT

## 2022-03-23 PROCEDURE — 99231 SBSQ HOSP IP/OBS SF/LOW 25: CPT | Performed by: PHYSICIAN ASSISTANT

## 2022-03-23 PROCEDURE — 74011250636 HC RX REV CODE- 250/636: Performed by: HOSPITALIST

## 2022-03-23 PROCEDURE — 2709999900 HC NON-CHARGEABLE SUPPLY

## 2022-03-23 PROCEDURE — 65660000000 HC RM CCU STEPDOWN

## 2022-03-23 PROCEDURE — 99232 SBSQ HOSP IP/OBS MODERATE 35: CPT | Performed by: INTERNAL MEDICINE

## 2022-03-23 PROCEDURE — 80048 BASIC METABOLIC PNL TOTAL CA: CPT

## 2022-03-23 PROCEDURE — 74011250637 HC RX REV CODE- 250/637: Performed by: HOSPITALIST

## 2022-03-23 PROCEDURE — 77030038269 HC DRN EXT URIN PURWCK BARD -A

## 2022-03-23 RX ORDER — LISINOPRIL 20 MG/1
20 TABLET ORAL DAILY
Status: DISCONTINUED | OUTPATIENT
Start: 2022-03-23 | End: 2022-03-27

## 2022-03-23 RX ORDER — HYDRALAZINE HYDROCHLORIDE 20 MG/ML
20 INJECTION INTRAMUSCULAR; INTRAVENOUS
Status: DISCONTINUED | OUTPATIENT
Start: 2022-03-23 | End: 2022-03-29 | Stop reason: HOSPADM

## 2022-03-23 RX ADMIN — POLYETHYLENE GLYCOL 3350 17 G: 17 POWDER, FOR SOLUTION ORAL at 20:06

## 2022-03-23 RX ADMIN — HYDROMORPHONE HYDROCHLORIDE 2 MG: 2 TABLET ORAL at 20:06

## 2022-03-23 RX ADMIN — GABAPENTIN 100 MG: 100 CAPSULE ORAL at 08:23

## 2022-03-23 RX ADMIN — GABAPENTIN 100 MG: 100 CAPSULE ORAL at 16:50

## 2022-03-23 RX ADMIN — SODIUM CHLORIDE, PRESERVATIVE FREE 10 ML: 5 INJECTION INTRAVENOUS at 22:16

## 2022-03-23 RX ADMIN — HYDROMORPHONE HYDROCHLORIDE 2 MG: 2 TABLET ORAL at 13:25

## 2022-03-23 RX ADMIN — METHOCARBAMOL TABLETS 500 MG: 500 TABLET, COATED ORAL at 08:23

## 2022-03-23 RX ADMIN — METHOCARBAMOL TABLETS 500 MG: 500 TABLET, COATED ORAL at 22:16

## 2022-03-23 RX ADMIN — METOPROLOL SUCCINATE 12.5 MG: 25 TABLET, EXTENDED RELEASE ORAL at 08:24

## 2022-03-23 RX ADMIN — TRAMADOL HYDROCHLORIDE 50 MG: 50 TABLET, COATED ORAL at 05:58

## 2022-03-23 RX ADMIN — HYDRALAZINE HYDROCHLORIDE 20 MG: 20 INJECTION INTRAMUSCULAR; INTRAVENOUS at 11:12

## 2022-03-23 RX ADMIN — HYDROMORPHONE HYDROCHLORIDE 2 MG: 2 TABLET ORAL at 08:24

## 2022-03-23 RX ADMIN — ASPIRIN 81 MG: 81 TABLET ORAL at 08:23

## 2022-03-23 RX ADMIN — SODIUM CHLORIDE, PRESERVATIVE FREE 10 ML: 5 INJECTION INTRAVENOUS at 13:43

## 2022-03-23 RX ADMIN — METHOCARBAMOL TABLETS 500 MG: 500 TABLET, COATED ORAL at 16:50

## 2022-03-23 RX ADMIN — SODIUM CHLORIDE, PRESERVATIVE FREE 10 ML: 5 INJECTION INTRAVENOUS at 05:58

## 2022-03-23 RX ADMIN — LISINOPRIL 20 MG: 20 TABLET ORAL at 08:23

## 2022-03-23 NOTE — PROGRESS NOTES
BP at 1633 was 190/80 and Hydralazine was ordered. Patients bp right now was 199/86 and the prn hydralazine is not due yet. Hospitalist notified and order for prn q4 hydralazine received and scheduled amlodipine received.

## 2022-03-23 NOTE — PROGRESS NOTES
[facility] CARDIOLOGY PROGRESS NOTE    3/23/2022 9:36 AM    Admit Date: 3/21/2022        Subjective:   Stable overnight without angina, CHF, or palpitations but having trouble getting comfortable with severe back pain overnight. Blood pressure consistently high, probably being driven by pain at this point, otherwise vitals stable and controlled. Sinus clinically on telemetry. No other complaints overnight. Tolerating meds well. Objective:      Vitals:    03/22/22 2323 03/23/22 0125 03/23/22 0353 03/23/22 0748   BP: (!) 185/82 (!) 168/71 (!) 161/68 (!) 194/77   Pulse: 68 72 66 72   Resp: 17  18 16   Temp: 98.7 °F (37.1 °C)  98.2 °F (36.8 °C) 98.3 °F (36.8 °C)   SpO2:   96% 95%   Weight:       Height:           Physical Exam:  Neck- supple, no JVD  CV- regular rate and rhythm no MRG  Lung- clear bilaterally  Abd- soft, nontender, nondistended  Ext- no edema  Skin- warm and dry    Data Review:   Recent Labs     03/23/22  0840 03/23/22  0513 03/22/22  0958 03/21/22 2007 03/21/22 2007     --   --   --  140   K 3.4*  --   --   --  3.4*   MG  --   --   --   --  2.1   BUN 9  --   --   --  20   CREA 0.80  --   --   --  1.10*   *  --   --   --  135*   WBC  --   --   --   --  10.6   HGB  --   --   --   --  10.9*   HCT  --   --   --   --  33.1*   PLT  --   --   --   --  268   INR  --  1.4 3.7   < > 7.5*    < > = values in this interval not displayed. Assessment and Plan:     Fall with compression fracture -bedrest with pain control, advance activity and pain medicine as needed per orthopedic direction. Recheck BMP, CBC, magnesium in the morning. See below. Acceptable risk to proceed with back surgery later this week, see below recommendations     Paroxysmal/longstanding persistent A. Fib -status post cardioversion, maintaining sinus rhythm. Placed on remote telemetry, continue current medications but hold Coumadin for surgery. Control pain postoperatively.   Avoid excessive volume overload perioperatively if possible.     Hypertension -stable prior to admission, continue meds and titrate as needed-currently uncontrolled but in severe pain. Needs pain control and if blood pressure remains high despite no back pain, titrate meds as needed. Previously well controlled consistently and controlled yesterday afternoon as well when not having severe pain     Chronic anticoagulation -on Coumadin and Plavix with history of atrial fibrillation and recent percutaneous intervention. Holding Plavix for surgery, start low-dose aspirin and continue this through surgery if okay with other physicians. Holding Coumadin for surgery, resume Coumadin postoperatively when okay with orthopedics from a postoperative bleeding standpoint. Intervention of the RCA was April 22 of last year. Okay to just stay off Plavix and continue Coumadin and low-dose aspirin postoperatively if continues to do well from a coronary standpoint.     Zak Reynolds MD  [facility] Cardiology  Pager [phone]

## 2022-03-23 NOTE — PROGRESS NOTES
[facility] Hospitalist Consult   Admit Date:  3/21/2022  7:35 PM   Name:  Roxane Claude   Age:  [de-identified] y.o. Sex:  female  :  1940   MRN:  [id number]   Room:  [facility]    Presenting Complaint: Fall    Reason(s) for Admission: Closed compression fracture of lumbosacral spine ([facility] [address] [S32.000A]     Hospitalists consulted by Briana Pope MD for: Medical management    History of Presenting Illness:   Roxane Claude is a [de-identified] y.o. female with history of coronary artery disease status post stenting in 2021, persistent atrial fibrillation on Coumadin, hypertension, anemia, obstructive sleep apnea presented to the ER on 3/15 after a fall where she was found to have several compression fractures. She was scheduled for surgery on Friday, 3/25/2022. However she fell again and therefore was having significantly worsening pain. She was admitted to Orthopedics service for further evaluation and management. Hospitalist consulted for preop clearance. Patient has coronary artery disease status post stenting in 2021. Cardiology was consulted. Cardiology recommends Plavix can be held. Postop recommends patient on aspirin and Coumadin when cleared by orthopedics. She had supratherapeutic INR on admission of 7.5. Received IV vitamin K and INR this morning is 1.4. Subjective    Patient is doing well this morning. She reports resting well. No chest pain no shortness of breath. Back pain better. Review of Systems:  10 systems reviewed and negative except as noted in HPI. Assessment & Plan:   Principal Problem: This is a [de-identified] y female with:      Closed compression fracture of lumbosacral spine ([address] (3/21/2022)  Per primary orthopedics. Plan for surgical fixation on 3/25. Cardiology consulted for preop clearance as patient had coronary artery disease with stents placed in 2021. Surgical risk acceptable per cardiology. Coronary artery disease  Cardiology on board.   Continue aspirin, beta-blocker, ACE inhibitor. Cardiology recommends Plavix to be discontinued, as stent placed on 4/22/2021. Postop resume Coumadin. Per cardiology, stay off of Plavix after surgery continue Coumadin and low-dose aspirin postoperatively the patient continues to do well. Longstanding persistent atrial fibrillation  Coumadin on hold. INR is 3.7 this am.   Resume ASAP when okay from surgical standpoint per cardiology. Continue metoprolol. Hypertension  Blood pressure intermittently poorly controlled due to pain. Lisinopril dose is increased to 20 mg daily. Hydralazine as needed. Obstructive sleep apnea: Thank you for allowing us the pleasure of participating in the care of this patient. Disposition per primary orthopedics. Hospital Problems as of 3/23/2022 Date Reviewed: 3/11/2022          Codes Class Noted - Resolved POA    CAD (coronary artery disease) ICD-10-CM: I25.10  ICD-9-CM: 414.00  3/22/2022 - Present Yes        HTN (hypertension) ICD-10-CM: I10  ICD-9-CM: 401.9  3/22/2022 - Present Yes        * (Principal) Closed compression fracture of lumbosacral spine ([facility] [address] ICD-10-CM: S32.000A  ICD-9-CM: 805.4  3/21/2022 - Present Yes        Longstanding persistent atrial fibrillation ([facility] [address] ICD-10-CM: I48.11  ICD-9-CM: 427.31  3/8/2021 - Present Yes        Suspected sleep apnea ICD-10-CM: R29.818  ICD-9-CM: 781.99  2/11/2021 - Present Yes              Past History:  Past Medical History:   Diagnosis Date    Arrhythmia     Benign essential HTN     managed with med    CAD (coronary artery disease) 04/2021    cardiac stent distal LAD, PTCA    Macrocytic anemia 2019    Persistent atrial fibrillation (HCC)     Restless legs     Sleep apnea     no cpap, dental device from sleep dentist      Past Surgical History:   Procedure Laterality Date    HX APPENDECTOMY  age 10    HX WRIST FRACTURE TX Left 07/23/2020    [facility] CARDI[facility] SUR[facility] PROCEDURE UNLIST  04/2021    card.  cath, LAURENCE distal LAD, PTCA      No Known Allergies   Social History     Tobacco Use    Smoking status: Never Smoker    Smokeless tobacco: Never Used    Tobacco comment: heavy secondhand exposure   Substance Use Topics    Alcohol use: Never      Family History   Problem Relation Age of Onset    Cancer Brother     Alcohol abuse Brother       Family history reviewed and negative except as otherwise noted.     Immunization History   Administered Date(s) Administered    TB Skin Test (PPD) Intradermal 03/22/2022     Current Facility-Administered Medications   Medication Dose Route Frequency    lisinopriL (PRINIVIL, ZESTRIL) tablet 20 mg  20 mg Oral DAILY    hydrALAZINE (APRESOLINE) 20 mg/mL injection 20 mg  20 mg IntraVENous Q4H PRN    aspirin delayed-release tablet 81 mg  81 mg Oral DAILY    gabapentin (NEURONTIN) capsule 100 mg  100 mg Oral BID    methocarbamoL (ROBAXIN) tablet 500 mg  500 mg Oral TID    metoprolol succinate (TOPROL-XL) XL tablet 12.5 mg  12.5 mg Oral DAILY    traMADoL (ULTRAM) tablet 50 mg  50 mg Oral Q6H PRN    sodium chloride (NS) flush 5-40 mL  5-40 mL IntraVENous Q8H    sodium chloride (NS) flush 5-40 mL  5-40 mL IntraVENous PRN    acetaminophen (TYLENOL) tablet 650 mg  650 mg Oral Q6H PRN    Or    acetaminophen (TYLENOL) suppository 650 mg  650 mg Rectal Q6H PRN    polyethylene glycol (MIRALAX) packet 17 g  17 g Oral DAILY PRN    ondansetron (ZOFRAN ODT) tablet 4 mg  4 mg Oral Q8H PRN    Or    ondansetron (ZOFRAN) injection 4 mg  4 mg IntraVENous Q6H PRN    HYDROmorphone (DILAUDID) injection 0.5 mg  0.5 mg IntraVENous Q3H PRN    HYDROmorphone (DILAUDID) tablet 2 mg  2 mg Oral Q4H PRN       Objective:     Patient Vitals for the past 24 hrs:   Temp Pulse Resp BP SpO2   03/23/22 1043 98.3 °F (36.8 °C) 65 16 (!) 164/65 94 %   03/23/22 1001  69  (!) 183/80    03/23/22 0748 98.3 °F (36.8 °C) 72 16 (!) 194/77 95 %   03/23/22 0353 98.2 °F (36.8 °C) 66 18 (!) 161/68 96 %   03/23/22 0125  72  (!) 168/71    03/22/22 2323 98.7 °F (37.1 °C) 68 17 (!) 185/82    03/22/22 1904 97.7 °F (36.5 °C) 68 16 (!) 199/86 97 %   03/22/22 1633 98.1 °F (36.7 °C) 65 18 (!) 190/80 97 %     Oxygen Therapy  O2 Sat (%): 94 % (03/23/22 1043)  Pulse via Oximetry: 63 beats per minute (03/21/22 2008)  O2 Device: None (Room air) (03/22/22 2323)    Estimated body mass index is 25.75 kg/m² as calculated from the following:    Height as of this encounter: 5' 4\" (1.626 m). Weight as of this encounter: 68 kg (150 lb). Intake/Output Summary (Last 24 hours) at 3/23/2022 1337  Last data filed at 3/23/2022 0353  Gross per 24 hour   Intake    Output 1850 ml   Net -1850 ml         Physical Exam:    Blood pressure (!) 164/65, pulse 65, temperature 98.3 °F (36.8 °C), resp. rate 16, height 5' 4\" (1.626 m), weight 68 kg (150 lb), SpO2 94 %. General:    Elderly female, No overt distress  Head:  Normocephalic, atraumatic  Eyes:  Sclerae appear normal.  Pupils equally round. ENT:  Nares appear normal, no drainage. Moist oral mucosa  Neck:  No restricted ROM. Trachea midline   CV:   RRR. No m/r/g. No jugular venous distension. Lungs:   CTAB. No wheezing, rhonchi, or rales. Respirations even, unlabored  Abdomen: Bowel sounds present. Soft, nontender, nondistended. Tenderness in lower back,   Extremities: No cyanosis or clubbing. No edema  Skin:     No rashes and normal coloration. Warm and dry. Neuro:  CN II-XII grossly intact. Sensation intact. A&Ox3  Psych:  Normal mood and affect.       I have reviewed ordered lab tests and independently visualized imaging below:    Recent Labs:  Recent Results (from the past 48 hour(s))   EKG, 12 LEAD, INITIAL    Collection Time: 03/21/22  7:59 PM   Result Value Ref Range    Ventricular Rate 64 BPM    Atrial Rate 65 BPM    P-R Interval 198 ms    QRS Duration 116 ms    Q-T Interval 428 ms    QTC Calculation (Bezet) 442 ms    Calculated P Axis 18 degrees    Calculated R Axis -20 degrees Calculated T Axis 15 degrees    Diagnosis       Normal sinus rhythm  Nonspecific T wave abnormality  Abnormal ECG  When compared with ECG of 22 APR 2021 12:32:40. Premature atrial complexes is no longer Present  Confirmed by David Baeza ([id number]) on 3/22/2022 6:29:20 AM     CBC WITH AUTOMATED DIFF    Collection Time: 03/21/22  8:07 PM   Result Value Ref Range    WBC 10.6 4.3 - 11.1 K/uL    RBC 3.37 (L) 4.05 - 5.2 M/uL    HGB 10.9 (L) 11.7 - 15.4 g/dL    HCT 33.1 (L) 35.8 - 46.3 %    MCV 98.2 (H) 79.6 - 97.8 FL    MCH 32.3 26.1 - 32.9 PG    MCHC 32.9 31.4 - 35.0 g/dL    RDW 13.1 11.9 - 14.6 %    PLATELET 030 109 - 094 K/uL    MPV 10.4 9.4 - 12.3 FL    ABSOLUTE NRBC 0.00 0.0 - 0.2 K/uL    DF AUTOMATED      NEUTROPHILS 72 43 - 78 %    LYMPHOCYTES 18 13 - 44 %    MONOCYTES 7 4.0 - 12.0 %    EOSINOPHILS 2 0.5 - 7.8 %    BASOPHILS 1 0.0 - 2.0 %    IMMATURE GRANULOCYTES 0 0.0 - 5.0 %    ABS. NEUTROPHILS 7.6 1.7 - 8.2 K/UL    ABS. LYMPHOCYTES 1.9 0.5 - 4.6 K/UL    ABS. MONOCYTES 0.8 0.1 - 1.3 K/UL    ABS. EOSINOPHILS 0.2 0.0 - 0.8 K/UL    ABS. BASOPHILS 0.1 0.0 - 0.2 K/UL    ABS. IMM. GRANS. 0.0 0.0 - 0.5 K/UL   PROTHROMBIN TIME + INR    Collection Time: 03/21/22  8:07 PM   Result Value Ref Range    Prothrombin time 64.2 (H) 12.6 - 14.5 sec    INR 7.5 (HH)     METABOLIC PANEL, COMPREHENSIVE    Collection Time: 03/21/22  8:07 PM   Result Value Ref Range    Sodium 140 136 - 145 mmol/L    Potassium 3.4 (L) 3.5 - 5.1 mmol/L    Chloride 105 98 - 107 mmol/L    CO2 29 21 - 32 mmol/L    Anion gap 6 (L) 7 - 16 mmol/L    Glucose 135 (H) 65 - 100 mg/dL    BUN 20 8 - 23 MG/DL    Creatinine 1.10 (H) 0.6 - 1.0 MG/DL    GFR est AA >60 >60 ml/min/1.73m2    GFR est non-AA 51 (L) >60 ml/min/1.73m2    Calcium 8.8 8.3 - 10.4 MG/DL    Bilirubin, total 0.4 0.2 - 1.1 MG/DL    ALT (SGPT) 23 12 - 65 U/L    AST (SGOT) 31 15 - 37 U/L    Alk.  phosphatase 114 50 - 136 U/L    Protein, total 6.4 6.3 - 8.2 g/dL    Albumin 2.9 (L) 3.2 - 4.6 g/dL Globulin 3.5 2.3 - 3.5 g/dL    A-G Ratio 0.8 (L) 1.2 - 3.5     MAGNESIUM    Collection Time: 03/21/22  8:07 PM   Result Value Ref Range    Magnesium 2.1 1.8 - 2.4 mg/dL   URINALYSIS W/ RFLX MICROSCOPIC    Collection Time: 03/22/22 12:05 AM   Result Value Ref Range    Color YELLOW      Appearance CLEAR      Specific gravity 1.016 1.001 - 1.023      pH (UA) 5.5 5.0 - 9.0      Protein Negative NEG mg/dL    Glucose Negative mg/dL    Ketone Negative NEG mg/dL    Bilirubin Negative NEG      Blood Negative NEG      Urobilinogen 1.0 0.2 - 1.0 EU/dL    Nitrites Negative NEG      Leukocyte Esterase TRACE (A) NEG      WBC 3-5 0 /hpf    RBC 0-3 0 /hpf    Epithelial cells 0-3 0 /hpf    Bacteria 0 0 /hpf    Casts 0 0 /lpf   PROTHROMBIN TIME + INR    Collection Time: 03/22/22  9:58 AM   Result Value Ref Range    Prothrombin time 37.0 (H) 12.6 - 14.5 sec    INR 3.7     PROTHROMBIN TIME + INR    Collection Time: 03/23/22  5:13 AM   Result Value Ref Range    Prothrombin time 17.6 (H) 12.6 - 14.5 sec    INR 1.4     METABOLIC PANEL, BASIC    Collection Time: 03/23/22  8:40 AM   Result Value Ref Range    Sodium 141 136 - 145 mmol/L    Potassium 3.4 (L) 3.5 - 5.1 mmol/L    Chloride 107 98 - 107 mmol/L    CO2 31 21 - 32 mmol/L    Anion gap 3 (L) 7 - 16 mmol/L    Glucose 129 (H) 65 - 100 mg/dL    BUN 9 8 - 23 MG/DL    Creatinine 0.80 0.6 - 1.0 MG/DL    GFR est AA >60 >60 ml/min/1.73m2    GFR est non-AA >60 >60 ml/min/1.73m2    Calcium 9.2 8.3 - 10.4 MG/DL       All Micro Results     None          Other Studies:  No results found. Signed:  Ave Devi MD    Part of this note may have been written by using a voice dictation software. The note has been proof read but may still contain some grammatical/other typographical errors.

## 2022-03-23 NOTE — PROGRESS NOTES
Subsequent Care Note [facility] Orthopedics    Patient ID:  Name: Cuong Saab  MRN: [id number]  AGE: [de-identified] y.o.  : 1940      2022           Admit Date: 3/21/2022  Admit Diagnosis: Closed compression fracture of lumbosacral spine ([facility] [address] [S32.000A]       Principle Problem: Closed compression fracture of lumbosacral spine ([address] Subjective: Doing well, No new complaints, pain is under control, No SOB, No Chest Pain, No Nausea or Vomiting     Review of Systems:  Pertinent items are noted in HPI.       ALLERGIES: No Known Allergies     Patient Medications    Current Facility-Administered Medications   Medication Dose Route Frequency    lisinopriL (PRINIVIL, ZESTRIL) tablet 20 mg  20 mg Oral DAILY    aspirin delayed-release tablet 81 mg  81 mg Oral DAILY    gabapentin (NEURONTIN) capsule 100 mg  100 mg Oral BID    methocarbamoL (ROBAXIN) tablet 500 mg  500 mg Oral TID    metoprolol succinate (TOPROL-XL) XL tablet 12.5 mg  12.5 mg Oral DAILY    traMADoL (ULTRAM) tablet 50 mg  50 mg Oral Q6H PRN    tuberculin injection 5 Units  5 Units IntraDERMal ONCE    hydrALAZINE (APRESOLINE) 20 mg/mL injection 10 mg  10 mg IntraVENous Q4H PRN    sodium chloride (NS) flush 5-40 mL  5-40 mL IntraVENous Q8H    sodium chloride (NS) flush 5-40 mL  5-40 mL IntraVENous PRN    acetaminophen (TYLENOL) tablet 650 mg  650 mg Oral Q6H PRN    Or    acetaminophen (TYLENOL) suppository 650 mg  650 mg Rectal Q6H PRN    polyethylene glycol (MIRALAX) packet 17 g  17 g Oral DAILY PRN    ondansetron (ZOFRAN ODT) tablet 4 mg  4 mg Oral Q8H PRN    Or    ondansetron (ZOFRAN) injection 4 mg  4 mg IntraVENous Q6H PRN    HYDROmorphone (DILAUDID) injection 0.5 mg  0.5 mg IntraVENous Q3H PRN    HYDROmorphone (DILAUDID) tablet 2 mg  2 mg Oral Q4H PRN           Patient Vitals for the past 8 hrs:   BP Temp Pulse Resp SpO2   22 0748 (!) 194/77 98.3 °F (36.8 °C) 72 16 95 %   22 0353 (!) 161/68 98.2 °F (36.8 °C) 66 18 96 %   03/23/22 0125 (!) 168/71  67         Physical Exam:      General: NAD, Alert, Oriented x 3   Lungs: Respirations even and unlabored, Breath Sounds were clear, no respiratory distress   Vascular: Distal pulses intact, good capillary refill   Skin: No redness, No Rashes, Skin is dry   Neuro: No gross deficits          Lab Results   Component Value Date/Time    HGB 10.9 (L) 03/21/2022 08:07 PM       Assessment / Plan :  Patient Active Problem List   Diagnosis Code    Suspected sleep apnea R29.818    Dyspnea on exertion R06.00    Chronic cough R05.3    RLS (restless legs syndrome) G25.81    Longstanding persistent atrial fibrillation (HCC) I48.11    Abnormal nuclear cardiac imaging test R93.1    Chronic fatigue R53.82    Bradycardia R00.1    Opioid use, unspecified with unspecified opioid-induced disorder F11.99    Closed compression fracture of lumbosacral spine (HCC) S32.000A    CAD (coronary artery disease) I25.10    HTN (hypertension) I10            Plan is for L3/L4 kyphoplasty with Dr. Isael Bennett on Friday   Cleared by Cardiology for surgery   Medical mgmt per hospitalist   NPO Friday after Midnight   Continue to hold coumadin and plavix            Signed By: DENIS Sheikh  3/23/2022,  8:56 AM

## 2022-03-24 ENCOUNTER — ANESTHESIA EVENT (OUTPATIENT)
Dept: SURGERY | Age: 82
DRG: 516 | End: 2022-03-24
Payer: MEDICARE

## 2022-03-24 PROBLEM — S32.000A CLOSED COMPRESSION FRACTURE OF LUMBOSACRAL SPINE (HCC): Status: ACTIVE | Noted: 2022-03-21

## 2022-03-24 PROBLEM — I10 HTN (HYPERTENSION): Status: ACTIVE | Noted: 2022-03-22

## 2022-03-24 PROBLEM — I25.10 CAD (CORONARY ARTERY DISEASE): Status: ACTIVE | Noted: 2022-03-22

## 2022-03-24 LAB
INR PPP: 1.2
MM INDURATION POC: 0 MM (ref 0–5)
PPD POC: NEGATIVE NEGATIVE
PROTHROMBIN TIME: 15.7 SEC (ref 12.6–14.5)

## 2022-03-24 PROCEDURE — 65660000000 HC RM CCU STEPDOWN

## 2022-03-24 PROCEDURE — 99232 SBSQ HOSP IP/OBS MODERATE 35: CPT | Performed by: INTERNAL MEDICINE

## 2022-03-24 PROCEDURE — 74011000250 HC RX REV CODE- 250: Performed by: PHYSICIAN ASSISTANT

## 2022-03-24 PROCEDURE — 85610 PROTHROMBIN TIME: CPT

## 2022-03-24 PROCEDURE — 74011250637 HC RX REV CODE- 250/637: Performed by: PHYSICIAN ASSISTANT

## 2022-03-24 PROCEDURE — 74011250637 HC RX REV CODE- 250/637: Performed by: INTERNAL MEDICINE

## 2022-03-24 PROCEDURE — 74011250637 HC RX REV CODE- 250/637: Performed by: HOSPITALIST

## 2022-03-24 PROCEDURE — 36415 COLL VENOUS BLD VENIPUNCTURE: CPT

## 2022-03-24 RX ORDER — POTASSIUM CHLORIDE 20 MEQ/1
40 TABLET, EXTENDED RELEASE ORAL
Status: COMPLETED | OUTPATIENT
Start: 2022-03-24 | End: 2022-03-24

## 2022-03-24 RX ORDER — POTASSIUM CHLORIDE 20 MEQ/1
20 TABLET, EXTENDED RELEASE ORAL
Status: DISCONTINUED | OUTPATIENT
Start: 2022-03-24 | End: 2022-03-24

## 2022-03-24 RX ORDER — METOPROLOL SUCCINATE 25 MG/1
25 TABLET, EXTENDED RELEASE ORAL DAILY
Status: DISCONTINUED | OUTPATIENT
Start: 2022-03-24 | End: 2022-03-26

## 2022-03-24 RX ADMIN — METHOCARBAMOL TABLETS 500 MG: 500 TABLET, COATED ORAL at 21:16

## 2022-03-24 RX ADMIN — HYDROMORPHONE HYDROCHLORIDE 2 MG: 2 TABLET ORAL at 21:21

## 2022-03-24 RX ADMIN — LISINOPRIL 20 MG: 20 TABLET ORAL at 08:16

## 2022-03-24 RX ADMIN — SODIUM CHLORIDE, PRESERVATIVE FREE 10 ML: 5 INJECTION INTRAVENOUS at 06:03

## 2022-03-24 RX ADMIN — GABAPENTIN 100 MG: 100 CAPSULE ORAL at 16:29

## 2022-03-24 RX ADMIN — HYDROMORPHONE HYDROCHLORIDE 2 MG: 2 TABLET ORAL at 12:49

## 2022-03-24 RX ADMIN — HYDROMORPHONE HYDROCHLORIDE 2 MG: 2 TABLET ORAL at 08:16

## 2022-03-24 RX ADMIN — GABAPENTIN 100 MG: 100 CAPSULE ORAL at 08:16

## 2022-03-24 RX ADMIN — HYDROMORPHONE HYDROCHLORIDE 2 MG: 2 TABLET ORAL at 16:53

## 2022-03-24 RX ADMIN — POTASSIUM CHLORIDE 40 MEQ: 20 TABLET, EXTENDED RELEASE ORAL at 08:16

## 2022-03-24 RX ADMIN — METHOCARBAMOL TABLETS 500 MG: 500 TABLET, COATED ORAL at 08:16

## 2022-03-24 RX ADMIN — METHOCARBAMOL TABLETS 500 MG: 500 TABLET, COATED ORAL at 16:29

## 2022-03-24 RX ADMIN — SODIUM CHLORIDE, PRESERVATIVE FREE 10 ML: 5 INJECTION INTRAVENOUS at 21:16

## 2022-03-24 RX ADMIN — METOPROLOL SUCCINATE 25 MG: 25 TABLET, EXTENDED RELEASE ORAL at 08:16

## 2022-03-24 RX ADMIN — HYDROMORPHONE HYDROCHLORIDE 2 MG: 2 TABLET ORAL at 03:07

## 2022-03-24 RX ADMIN — ASPIRIN 81 MG: 81 TABLET ORAL at 08:16

## 2022-03-24 RX ADMIN — SODIUM CHLORIDE, PRESERVATIVE FREE 10 ML: 5 INJECTION INTRAVENOUS at 13:45

## 2022-03-24 NOTE — PROGRESS NOTES
[facility] Hospitalist Consult   Admit Date:  3/21/2022  7:35 PM   Name:  Jeffry Huddleston   Age:  [de-identified] y.o. Sex:  female  :  1940   MRN:  [id number]   Room:  [facility]    Presenting Complaint: Fall    Reason(s) for Admission: Closed compression fracture of lumbosacral spine ([facility] [S32.000A]     Hospitalists consulted by Teresa Villalobos MD for: Medical management    History of Presenting Illness:   Jeffry Huddleston is a [de-identified] y.o. female with history of coronary artery disease status post stenting in 2021, persistent atrial fibrillation on Coumadin, hypertension, anemia, obstructive sleep apnea presented to the ER on 3/15 after a fall where she was found to have several compression fractures. She was scheduled for surgery on Friday, 3/25/2022. However she fell again and therefore was having significantly worsening pain. She was admitted to Orthopedics service for further evaluation and management. Hospitalist consulted for preop clearance. Patient has coronary artery disease status post stenting in 2021. Cardiology was consulted. Cardiology recommends Plavix can be held. Postop recommends patient on aspirin and Coumadin when cleared by orthopedics. She had supratherapeutic INR on admission of 7.5. Received IV vitamin K and INR this morning is 1.2. Subjective:  Patient is doing well. No fever no chills. No chest pain. Back pain improved. No nausea no vomiting. Review of Systems:  10 systems reviewed and negative except as noted in HPI. Assessment & Plan:   Principal Problem: This is a [de-identified] y female with:      Closed compression fracture of lumbosacral spine ([facility] [address] (3/21/2022)  Per primary orthopedics. Plan for L3-L4 kyphoplasty tomorrow 3/25. Cardiology consulted for preop clearance as patient had coronary artery disease with stents placed in 2021. Surgical risk acceptable per cardiology. Coronary artery disease  Cardiology on board.   Continue aspirin, beta-blocker, ACE inhibitor. Cardiology recommends Plavix to be discontinued, as stent placed on 4/22/2021. Postop resume Coumadin. Per cardiology, stay off of Plavix after surgery continue Coumadin and low-dose aspirin postoperatively the patient continues to do well. Longstanding persistent atrial fibrillation  Coumadin on hold. INR is 1.2 this am.   Resume ASAP when okay from surgical standpoint per cardiology. Continue metoprolol. Hypertension  Blood pressure intermittently poorly controlled due to pain. Lisinopril dose is increased to 20 mg daily. Metoprolol dose increased to 25 mg daily. Hydralazine as needed. Obstructive sleep apnea: Thank you for allowing us the pleasure of participating in the care of this patient. Hospitalist will sign off. Disposition per primary orthopedics.       Hospital Problems as of 3/24/2022 Date Reviewed: 3/11/2022          Codes Class Noted - Resolved POA    CAD (coronary artery disease) ICD-10-CM: I25.10  ICD-9-CM: 414.00  3/22/2022 - Present Yes        HTN (hypertension) ICD-10-CM: I10  ICD-9-CM: 401.9  3/22/2022 - Present Yes        * (Principal) Closed compression fracture of lumbosacral spine ([facility] [address] ICD-10-CM: S32.000A  ICD-9-CM: 805.4  3/21/2022 - Present Yes        Longstanding persistent atrial fibrillation ([facility] [address] ICD-10-CM: I48.11  ICD-9-CM: 427.31  3/8/2021 - Present Yes        Suspected sleep apnea ICD-10-CM: R29.818  ICD-9-CM: 781.99  2/11/2021 - Present Yes              Past History:  Past Medical History:   Diagnosis Date    Arrhythmia     Benign essential HTN     managed with med    CAD (coronary artery disease) 04/2021    cardiac stent distal LAD, PTCA    Macrocytic anemia 2019    Persistent atrial fibrillation (HCC)     Restless legs     Sleep apnea     no cpap, dental device from sleep dentist      Past Surgical History:   Procedure Laterality Date    HX APPENDECTOMY  age 10    HX WRIST FRACTURE TX Left 07/23/2020    NE CARDIAC SURG PROCEDURE UNLIST  04/2021    card. cath, LAURENCE distal LAD, PTCA      No Known Allergies   Social History     Tobacco Use    Smoking status: Never Smoker    Smokeless tobacco: Never Used    Tobacco comment: heavy secondhand exposure   Substance Use Topics    Alcohol use: Never      Family History   Problem Relation Age of Onset    Cancer Brother     Alcohol abuse Brother       Family history reviewed and negative except as otherwise noted.     Immunization History   Administered Date(s) Administered    TB Skin Test (PPD) Intradermal 03/22/2022     Current Facility-Administered Medications   Medication Dose Route Frequency    metoprolol succinate (TOPROL-XL) XL tablet 25 mg  25 mg Oral DAILY    lisinopriL (PRINIVIL, ZESTRIL) tablet 20 mg  20 mg Oral DAILY    hydrALAZINE (APRESOLINE) 20 mg/mL injection 20 mg  20 mg IntraVENous Q4H PRN    aspirin delayed-release tablet 81 mg  81 mg Oral DAILY    gabapentin (NEURONTIN) capsule 100 mg  100 mg Oral BID    methocarbamoL (ROBAXIN) tablet 500 mg  500 mg Oral TID    traMADoL (ULTRAM) tablet 50 mg  50 mg Oral Q6H PRN    sodium chloride (NS) flush 5-40 mL  5-40 mL IntraVENous Q8H    sodium chloride (NS) flush 5-40 mL  5-40 mL IntraVENous PRN    acetaminophen (TYLENOL) tablet 650 mg  650 mg Oral Q6H PRN    Or    acetaminophen (TYLENOL) suppository 650 mg  650 mg Rectal Q6H PRN    polyethylene glycol (MIRALAX) packet 17 g  17 g Oral DAILY PRN    ondansetron (ZOFRAN ODT) tablet 4 mg  4 mg Oral Q8H PRN    Or    ondansetron (ZOFRAN) injection 4 mg  4 mg IntraVENous Q6H PRN    HYDROmorphone (DILAUDID) injection 0.5 mg  0.5 mg IntraVENous Q3H PRN    HYDROmorphone (DILAUDID) tablet 2 mg  2 mg Oral Q4H PRN       Objective:     Patient Vitals for the past 24 hrs:   Temp Pulse Resp BP SpO2   03/24/22 1117 98.6 °F (37 °C) 65 16 (!) 143/65 93 %   03/24/22 0720 97.7 °F (36.5 °C) 72 17 (!) 174/68 94 %   03/24/22 0343 97.8 °F (36.6 °C) 80 18 (!) 154/92 95 %   03/24/22 0009 99.2 °F (37.3 °C) 73 18 (!) 160/78 92 %   03/23/22 1911 98.7 °F (37.1 °C) 78 18 (!) 151/70 94 %   03/23/22 1459 98.1 °F (36.7 °C) 70 17 (!) 147/71 95 %     Oxygen Therapy  O2 Sat (%): 93 % (03/24/22 1117)  Pulse via Oximetry: 63 beats per minute (03/21/22 2008)  O2 Device: None (Room air) (03/22/22 2323)    Estimated body mass index is 25.75 kg/m² as calculated from the following:    Height as of this encounter: 5' 4\" (1.626 m). Weight as of this encounter: 68 kg (150 lb). Intake/Output Summary (Last 24 hours) at 3/24/2022 1427  Last data filed at 3/23/2022 1500  Gross per 24 hour   Intake    Output 700 ml   Net -700 ml         Physical Exam:    Blood pressure (!) 143/65, pulse 65, temperature 98.6 °F (37 °C), resp. rate 16, height 5' 4\" (1.626 m), weight 68 kg (150 lb), SpO2 93 %. General:    Elderly female, No overt distress  Head:  Normocephalic, atraumatic  Eyes:  Sclerae appear normal.  Pupils equally round. ENT:  Nares appear normal, no drainage. Moist oral mucosa  Neck:  No restricted ROM. Trachea midline   CV:   RRR. No m/r/g. No jugular venous distension. Lungs:   CTAB. No wheezing, rhonchi, or rales. Respirations even, unlabored  Abdomen: Bowel sounds present. Soft, nontender, nondistended. Tenderness in lower back,   Extremities: No cyanosis or clubbing. No edema  Skin:     No rashes and normal coloration. Warm and dry. Neuro:  CN II-XII grossly intact. Sensation intact. A&Ox3  Psych:  Normal mood and affect.       I have reviewed ordered lab tests and independently visualized imaging below:    Recent Labs:  Recent Results (from the past 48 hour(s))   PROTHROMBIN TIME + INR    Collection Time: 03/23/22  5:13 AM   Result Value Ref Range    Prothrombin time 17.6 (H) 12.6 - 14.5 sec    INR 1.4     METABOLIC PANEL, BASIC    Collection Time: 03/23/22  8:40 AM   Result Value Ref Range    Sodium 141 136 - 145 mmol/L    Potassium 3.4 (L) 3.5 - 5.1 mmol/L    Chloride 107 98 - 107 mmol/L    CO2 31 21 - 32 mmol/L    Anion gap 3 (L) 7 - 16 mmol/L    Glucose 129 (H) 65 - 100 mg/dL    BUN 9 8 - 23 MG/DL    Creatinine 0.80 0.6 - 1.0 MG/DL    GFR est AA >60 >60 ml/min/1.73m2    GFR est non-AA >60 >60 ml/min/1.73m2    Calcium 9.2 8.3 - 10.4 MG/DL   PROTHROMBIN TIME + INR    Collection Time: 03/24/22  5:04 AM   Result Value Ref Range    Prothrombin time 15.7 (H) 12.6 - 14.5 sec    INR 1.2     PLEASE READ & DOCUMENT PPD TEST IN 48 HRS    Collection Time: 03/24/22  9:38 AM   Result Value Ref Range    PPD Negative Negative    mm Induration 0 0 - 5 mm       All Micro Results     None          Other Studies:  No results found. Signed:  Loren Mendez MD    Part of this note may have been written by using a voice dictation software. The note has been proof read but may still contain some grammatical/other typographical errors.

## 2022-03-24 NOTE — ANESTHESIA PREPROCEDURE EVALUATION
Relevant Problems   RESPIRATORY SYSTEM   (+) Dyspnea on exertion      CARDIOVASCULAR   (+) CAD (coronary artery disease)   (+) HTN (hypertension)   (+) Longstanding persistent atrial fibrillation (HCC)       Anesthetic History               Review of Systems / Medical History  Patient summary reviewed, nursing notes reviewed and pertinent labs reviewed    Pulmonary        Sleep apnea           Neuro/Psych              Cardiovascular    Hypertension        Dysrhythmias : atrial fibrillation  CAD and cardiac stents (4/21)    Exercise tolerance: <4 METS  Comments: Echo 7/21:  ·LV: Estimated LVEF is 50 - 55%. Normal cavity size, wall thickness and systolic function (ejection fraction normal). ·LA: Moderately dilated left atrium. The left atrial appendage with interrogated thoroughly with 2-D, X-plane and 3-D imaging. . No spontaneous echo contrast  ·RA: Mildly dilated right atrium. ·MV: Mild mitral valve regurgitation is present.        GI/Hepatic/Renal                Endo/Other        Anemia     Other Findings            Physical Exam    Airway  Mallampati: II  TM Distance: 4 - 6 cm  Neck ROM: normal range of motion   Mouth opening: Normal     Cardiovascular    Rhythm: irregular  Rate: normal         Dental  No notable dental hx       Pulmonary  Breath sounds clear to auscultation               Abdominal         Other Findings            Anesthetic Plan    ASA: 3  Anesthesia type: general          Induction: Intravenous  Anesthetic plan and risks discussed with: Patient

## 2022-03-24 NOTE — PROGRESS NOTES
3/24/2022 9:13 AM    Admit Date: 3/21/2022    Admit Diagnosis: Closed compression fracture of lumbosacral spine (HCC) [S32.000A]      Subjective:   Denies chest pain or shortness of breath      Objective:      Visit Vitals  BP (!) 174/68 (BP 1 Location: Right upper arm, BP Patient Position: Supine)   Pulse 72   Temp 97.7 °F (36.5 °C)   Resp 17   Ht 5' 4\" (1.626 m)   Wt 150 lb (68 kg)   SpO2 94%   BMI 25.75 kg/m²       Physical Exam:  Lalo Neat, Well Nourished, No Acute Distress, Alert & Oriented x 3, appropriate mood. Neck- supple, no JVD  CV- regular rate and rhythm no MRG  Lung- clear bilaterally  Abd- soft, nontender, nondistended  Ext- no edema bilaterally. Skin- warm and dry        Data Review:   Recent Labs     03/24/22  0504 03/23/22  0840 03/23/22  0513 03/22/22  0958 03/21/22 2007   NA  --  141  --   --  140   K  --  3.4*  --   --  3.4*   BUN  --  9  --   --  20   CREA  --  0.80  --   --  1.10*   WBC  --   --   --   --  10.6   HGB  --   --   --   --  10.9*   HCT  --   --   --   --  33.1*   PLT  --   --   --   --  268   INR 1.2  --    < >   < > 7.5*    < > = values in this interval not displayed. Assessment/Plan:     Principal Problem:    Closed compression fracture of lumbosacral spine ([address] (3/21/2022) plans for procedure on Friday. Active Problems:    Suspected sleep apnea (2/11/2021)      Longstanding persistent atrial fibrillation ([address] (3/8/2021)      CAD (coronary artery disease) (3/22/2022)      HTN (hypertension) (3/22/2022) blood pressure high. Lisinopril increased yesterday. Will increase Toprol today to 25 mg a day.

## 2022-03-24 NOTE — PROGRESS NOTES
Spine    AF,VSS  Patient still complains of back pain and wants to proceed with kyphoplasty surgery tomorrow  She will probably need snf for placement after surgery.

## 2022-03-25 ENCOUNTER — ANESTHESIA (OUTPATIENT)
Dept: SURGERY | Age: 82
DRG: 516 | End: 2022-03-25
Payer: MEDICARE

## 2022-03-25 ENCOUNTER — APPOINTMENT (OUTPATIENT)
Dept: GENERAL RADIOLOGY | Age: 82
DRG: 516 | End: 2022-03-25
Attending: ORTHOPAEDIC SURGERY
Payer: MEDICARE

## 2022-03-25 PROBLEM — M80.08XA VERTEBRAL FRACTURE, OSTEOPOROTIC (HCC): Status: ACTIVE | Noted: 2022-03-21

## 2022-03-25 LAB
INR PPP: 1.1
MM INDURATION POC: 0 MM (ref 0–5)
PPD POC: NEGATIVE NEGATIVE
PROTHROMBIN TIME: 14.6 SEC (ref 12.6–14.5)

## 2022-03-25 PROCEDURE — 74011250637 HC RX REV CODE- 250/637: Performed by: INTERNAL MEDICINE

## 2022-03-25 PROCEDURE — 74011000258 HC RX REV CODE- 258: Performed by: ORTHOPAEDIC SURGERY

## 2022-03-25 PROCEDURE — 74011250637 HC RX REV CODE- 250/637: Performed by: PHYSICIAN ASSISTANT

## 2022-03-25 PROCEDURE — 77030026361 HC SYR INFL XII KYPH -C: Performed by: ORTHOPAEDIC SURGERY

## 2022-03-25 PROCEDURE — 77030039425 HC BLD LARYNG TRULITE DISP TELE -A: Performed by: ANESTHESIOLOGY

## 2022-03-25 PROCEDURE — 22515 PERQ VERTEBRAL AUGMENTATION: CPT | Performed by: ORTHOPAEDIC SURGERY

## 2022-03-25 PROCEDURE — 74011000250 HC RX REV CODE- 250: Performed by: ORTHOPAEDIC SURGERY

## 2022-03-25 PROCEDURE — 76210000006 HC OR PH I REC 0.5 TO 1 HR: Performed by: ORTHOPAEDIC SURGERY

## 2022-03-25 PROCEDURE — 74011250637 HC RX REV CODE- 250/637: Performed by: ORTHOPAEDIC SURGERY

## 2022-03-25 PROCEDURE — 74011250636 HC RX REV CODE- 250/636: Performed by: ORTHOPAEDIC SURGERY

## 2022-03-25 PROCEDURE — 77030037088 HC TUBE ENDOTRACH ORAL NSL COVD-A: Performed by: ANESTHESIOLOGY

## 2022-03-25 PROCEDURE — C1713 ANCHOR/SCREW BN/BN,TIS/BN: HCPCS | Performed by: ORTHOPAEDIC SURGERY

## 2022-03-25 PROCEDURE — 77030011218 HC DEV BIOP BN KYPH -C: Performed by: ORTHOPAEDIC SURGERY

## 2022-03-25 PROCEDURE — 72100 X-RAY EXAM L-S SPINE 2/3 VWS: CPT

## 2022-03-25 PROCEDURE — 65660000000 HC RM CCU STEPDOWN

## 2022-03-25 PROCEDURE — 0QS03ZZ REPOSITION LUMBAR VERTEBRA, PERCUTANEOUS APPROACH: ICD-10-PCS | Performed by: ORTHOPAEDIC SURGERY

## 2022-03-25 PROCEDURE — 77030040922 HC BLNKT HYPOTHRM STRY -A: Performed by: ANESTHESIOLOGY

## 2022-03-25 PROCEDURE — 77030002916 HC SUT ETHLN J&J -A: Performed by: ORTHOPAEDIC SURGERY

## 2022-03-25 PROCEDURE — 77030018673: Performed by: ORTHOPAEDIC SURGERY

## 2022-03-25 PROCEDURE — 0QU03JZ SUPPLEMENT LUMBAR VERTEBRA WITH SYNTHETIC SUBSTITUTE, PERCUTANEOUS APPROACH: ICD-10-PCS | Performed by: ORTHOPAEDIC SURGERY

## 2022-03-25 PROCEDURE — 74011000250 HC RX REV CODE- 250: Performed by: PHYSICIAN ASSISTANT

## 2022-03-25 PROCEDURE — 74011000636 HC RX REV CODE- 636: Performed by: ORTHOPAEDIC SURGERY

## 2022-03-25 PROCEDURE — 85610 PROTHROMBIN TIME: CPT

## 2022-03-25 PROCEDURE — 77030037318 HC KT SPN BN TAMP AF KYPH -H: Performed by: ORTHOPAEDIC SURGERY

## 2022-03-25 PROCEDURE — 36415 COLL VENOUS BLD VENIPUNCTURE: CPT

## 2022-03-25 PROCEDURE — 77030019908 HC STETH ESOPH SIMS -A: Performed by: ANESTHESIOLOGY

## 2022-03-25 PROCEDURE — 77030028759 HC KT SPN BN TAMP FF KYPH -I2: Performed by: ORTHOPAEDIC SURGERY

## 2022-03-25 PROCEDURE — 99231 SBSQ HOSP IP/OBS SF/LOW 25: CPT | Performed by: INTERNAL MEDICINE

## 2022-03-25 PROCEDURE — 74011250636 HC RX REV CODE- 250/636: Performed by: ANESTHESIOLOGY

## 2022-03-25 PROCEDURE — 22514 PERQ VERTEBRAL AUGMENTATION: CPT | Performed by: ORTHOPAEDIC SURGERY

## 2022-03-25 PROCEDURE — 97162 PT EVAL MOD COMPLEX 30 MIN: CPT

## 2022-03-25 PROCEDURE — 74011000250 HC RX REV CODE- 250: Performed by: NURSE ANESTHETIST, CERTIFIED REGISTERED

## 2022-03-25 PROCEDURE — 76060000033 HC ANESTHESIA 1 TO 1.5 HR: Performed by: ORTHOPAEDIC SURGERY

## 2022-03-25 PROCEDURE — 2709999900 HC NON-CHARGEABLE SUPPLY: Performed by: ORTHOPAEDIC SURGERY

## 2022-03-25 PROCEDURE — 97530 THERAPEUTIC ACTIVITIES: CPT

## 2022-03-25 PROCEDURE — 74011250637 HC RX REV CODE- 250/637: Performed by: HOSPITALIST

## 2022-03-25 PROCEDURE — 74011250636 HC RX REV CODE- 250/636: Performed by: NURSE ANESTHETIST, CERTIFIED REGISTERED

## 2022-03-25 PROCEDURE — 76010000149 HC OR TIME 1 TO 1.5 HR: Performed by: ORTHOPAEDIC SURGERY

## 2022-03-25 PROCEDURE — 74011250637 HC RX REV CODE- 250/637: Performed by: ANESTHESIOLOGY

## 2022-03-25 DEVICE — BONE CEMENT CX01A XPEDE US
Type: IMPLANTABLE DEVICE | Site: SPINE LUMBAR | Status: FUNCTIONAL
Brand: KYPHON® XPEDE™ BONE CEMENT

## 2022-03-25 RX ORDER — SODIUM CHLORIDE, SODIUM LACTATE, POTASSIUM CHLORIDE, CALCIUM CHLORIDE 600; 310; 30; 20 MG/100ML; MG/100ML; MG/100ML; MG/100ML
75 INJECTION, SOLUTION INTRAVENOUS CONTINUOUS
Status: DISCONTINUED | OUTPATIENT
Start: 2022-03-25 | End: 2022-03-25 | Stop reason: HOSPADM

## 2022-03-25 RX ORDER — GLYCOPYRROLATE 0.2 MG/ML
INJECTION INTRAMUSCULAR; INTRAVENOUS AS NEEDED
Status: DISCONTINUED | OUTPATIENT
Start: 2022-03-25 | End: 2022-03-25 | Stop reason: HOSPADM

## 2022-03-25 RX ORDER — PROPOFOL 10 MG/ML
INJECTION, EMULSION INTRAVENOUS AS NEEDED
Status: DISCONTINUED | OUTPATIENT
Start: 2022-03-25 | End: 2022-03-25 | Stop reason: HOSPADM

## 2022-03-25 RX ORDER — NEOSTIGMINE METHYLSULFATE 1 MG/ML
INJECTION, SOLUTION INTRAVENOUS AS NEEDED
Status: DISCONTINUED | OUTPATIENT
Start: 2022-03-25 | End: 2022-03-25 | Stop reason: HOSPADM

## 2022-03-25 RX ORDER — MIDAZOLAM HYDROCHLORIDE 1 MG/ML
2 INJECTION, SOLUTION INTRAMUSCULAR; INTRAVENOUS ONCE
Status: DISCONTINUED | OUTPATIENT
Start: 2022-03-25 | End: 2022-03-25 | Stop reason: HOSPADM

## 2022-03-25 RX ORDER — OXYCODONE HYDROCHLORIDE 5 MG/1
10 TABLET ORAL
Status: COMPLETED | OUTPATIENT
Start: 2022-03-25 | End: 2022-03-25

## 2022-03-25 RX ORDER — FENTANYL CITRATE 50 UG/ML
INJECTION, SOLUTION INTRAMUSCULAR; INTRAVENOUS AS NEEDED
Status: DISCONTINUED | OUTPATIENT
Start: 2022-03-25 | End: 2022-03-25 | Stop reason: HOSPADM

## 2022-03-25 RX ORDER — FAMOTIDINE 20 MG/1
20 TABLET, FILM COATED ORAL ONCE
Status: COMPLETED | OUTPATIENT
Start: 2022-03-25 | End: 2022-03-25

## 2022-03-25 RX ORDER — ROCURONIUM BROMIDE 10 MG/ML
INJECTION, SOLUTION INTRAVENOUS AS NEEDED
Status: DISCONTINUED | OUTPATIENT
Start: 2022-03-25 | End: 2022-03-25 | Stop reason: HOSPADM

## 2022-03-25 RX ORDER — LIDOCAINE HYDROCHLORIDE 20 MG/ML
INJECTION, SOLUTION EPIDURAL; INFILTRATION; INTRACAUDAL; PERINEURAL AS NEEDED
Status: DISCONTINUED | OUTPATIENT
Start: 2022-03-25 | End: 2022-03-25 | Stop reason: HOSPADM

## 2022-03-25 RX ORDER — CEFAZOLIN SODIUM/WATER 2 G/20 ML
2 SYRINGE (ML) INTRAVENOUS ONCE
Status: COMPLETED | OUTPATIENT
Start: 2022-03-25 | End: 2022-03-25

## 2022-03-25 RX ORDER — LIDOCAINE HYDROCHLORIDE 10 MG/ML
0.1 INJECTION INFILTRATION; PERINEURAL AS NEEDED
Status: DISCONTINUED | OUTPATIENT
Start: 2022-03-25 | End: 2022-03-25 | Stop reason: HOSPADM

## 2022-03-25 RX ORDER — DEXAMETHASONE SODIUM PHOSPHATE 4 MG/ML
INJECTION, SOLUTION INTRA-ARTICULAR; INTRALESIONAL; INTRAMUSCULAR; INTRAVENOUS; SOFT TISSUE AS NEEDED
Status: DISCONTINUED | OUTPATIENT
Start: 2022-03-25 | End: 2022-03-25 | Stop reason: HOSPADM

## 2022-03-25 RX ORDER — ONDANSETRON 2 MG/ML
INJECTION INTRAMUSCULAR; INTRAVENOUS AS NEEDED
Status: DISCONTINUED | OUTPATIENT
Start: 2022-03-25 | End: 2022-03-25 | Stop reason: HOSPADM

## 2022-03-25 RX ORDER — LABETALOL HYDROCHLORIDE 5 MG/ML
INJECTION, SOLUTION INTRAVENOUS AS NEEDED
Status: DISCONTINUED | OUTPATIENT
Start: 2022-03-25 | End: 2022-03-25 | Stop reason: HOSPADM

## 2022-03-25 RX ORDER — HYDROMORPHONE HYDROCHLORIDE 2 MG/ML
0.5 INJECTION, SOLUTION INTRAMUSCULAR; INTRAVENOUS; SUBCUTANEOUS
Status: DISCONTINUED | OUTPATIENT
Start: 2022-03-25 | End: 2022-03-25 | Stop reason: HOSPADM

## 2022-03-25 RX ORDER — BUPIVACAINE HYDROCHLORIDE AND EPINEPHRINE 2.5; 5 MG/ML; UG/ML
INJECTION, SOLUTION EPIDURAL; INFILTRATION; INTRACAUDAL; PERINEURAL AS NEEDED
Status: DISCONTINUED | OUTPATIENT
Start: 2022-03-25 | End: 2022-03-25 | Stop reason: HOSPADM

## 2022-03-25 RX ORDER — FENTANYL CITRATE 50 UG/ML
100 INJECTION, SOLUTION INTRAMUSCULAR; INTRAVENOUS ONCE
Status: DISCONTINUED | OUTPATIENT
Start: 2022-03-25 | End: 2022-03-25 | Stop reason: HOSPADM

## 2022-03-25 RX ORDER — OXYCODONE HYDROCHLORIDE 5 MG/1
5 TABLET ORAL
Status: DISCONTINUED | OUTPATIENT
Start: 2022-03-25 | End: 2022-03-25 | Stop reason: HOSPADM

## 2022-03-25 RX ADMIN — METOPROLOL SUCCINATE 25 MG: 25 TABLET, EXTENDED RELEASE ORAL at 05:27

## 2022-03-25 RX ADMIN — Medication 3 MG: at 08:51

## 2022-03-25 RX ADMIN — PROPOFOL 20 MG: 10 INJECTION, EMULSION INTRAVENOUS at 08:32

## 2022-03-25 RX ADMIN — GABAPENTIN 100 MG: 100 CAPSULE ORAL at 17:46

## 2022-03-25 RX ADMIN — SODIUM CHLORIDE, SODIUM LACTATE, POTASSIUM CHLORIDE, AND CALCIUM CHLORIDE 75 ML/HR: 600; 310; 30; 20 INJECTION, SOLUTION INTRAVENOUS at 06:42

## 2022-03-25 RX ADMIN — CEFAZOLIN SODIUM 1 G: 1 INJECTION, POWDER, FOR SOLUTION INTRAMUSCULAR; INTRAVENOUS at 23:56

## 2022-03-25 RX ADMIN — ROCURONIUM BROMIDE 30 MG: 10 INJECTION, SOLUTION INTRAVENOUS at 08:08

## 2022-03-25 RX ADMIN — CEFAZOLIN SODIUM 1 G: 1 INJECTION, POWDER, FOR SOLUTION INTRAMUSCULAR; INTRAVENOUS at 16:28

## 2022-03-25 RX ADMIN — METHOCARBAMOL TABLETS 500 MG: 500 TABLET, COATED ORAL at 22:11

## 2022-03-25 RX ADMIN — FENTANYL CITRATE 50 MCG: 50 INJECTION INTRAMUSCULAR; INTRAVENOUS at 08:08

## 2022-03-25 RX ADMIN — ONDANSETRON 4 MG: 2 INJECTION INTRAMUSCULAR; INTRAVENOUS at 08:15

## 2022-03-25 RX ADMIN — FAMOTIDINE 20 MG: 20 TABLET ORAL at 06:41

## 2022-03-25 RX ADMIN — GABAPENTIN 100 MG: 100 CAPSULE ORAL at 05:27

## 2022-03-25 RX ADMIN — ASPIRIN 81 MG: 81 TABLET ORAL at 05:27

## 2022-03-25 RX ADMIN — SODIUM CHLORIDE, PRESERVATIVE FREE 5 ML: 5 INJECTION INTRAVENOUS at 05:04

## 2022-03-25 RX ADMIN — OXYCODONE 10 MG: 5 TABLET ORAL at 09:48

## 2022-03-25 RX ADMIN — PHENOL 1 SPRAY: 1.5 LIQUID ORAL at 14:56

## 2022-03-25 RX ADMIN — PROPOFOL 100 MG: 10 INJECTION, EMULSION INTRAVENOUS at 08:08

## 2022-03-25 RX ADMIN — SODIUM CHLORIDE, PRESERVATIVE FREE 10 ML: 5 INJECTION INTRAVENOUS at 13:05

## 2022-03-25 RX ADMIN — HYDROMORPHONE HYDROCHLORIDE 2 MG: 2 TABLET ORAL at 10:27

## 2022-03-25 RX ADMIN — Medication 2 G: at 08:16

## 2022-03-25 RX ADMIN — LIDOCAINE HYDROCHLORIDE 100 MG: 20 INJECTION, SOLUTION EPIDURAL; INFILTRATION; INTRACAUDAL; PERINEURAL at 08:08

## 2022-03-25 RX ADMIN — HYDROMORPHONE HYDROCHLORIDE 2 MG: 2 TABLET ORAL at 15:16

## 2022-03-25 RX ADMIN — GLYCOPYRROLATE 0.4 MG: 0.2 INJECTION, SOLUTION INTRAMUSCULAR; INTRAVENOUS at 08:51

## 2022-03-25 RX ADMIN — SODIUM CHLORIDE, PRESERVATIVE FREE 5 ML: 5 INJECTION INTRAVENOUS at 22:11

## 2022-03-25 RX ADMIN — DEXAMETHASONE SODIUM PHOSPHATE 10 MG: 4 INJECTION, SOLUTION INTRAMUSCULAR; INTRAVENOUS at 08:15

## 2022-03-25 RX ADMIN — LABETALOL HYDROCHLORIDE 10 MG: 5 INJECTION INTRAVENOUS at 08:35

## 2022-03-25 RX ADMIN — Medication 3 AMPULE: at 06:41

## 2022-03-25 RX ADMIN — FENTANYL CITRATE 50 MCG: 50 INJECTION INTRAMUSCULAR; INTRAVENOUS at 08:32

## 2022-03-25 RX ADMIN — METHOCARBAMOL TABLETS 500 MG: 500 TABLET, COATED ORAL at 16:28

## 2022-03-25 RX ADMIN — PROPOFOL 20 MG: 10 INJECTION, EMULSION INTRAVENOUS at 08:35

## 2022-03-25 NOTE — PERIOP NOTES
TRANSFER - OUT REPORT:    Verbal report given to MILTON Hollingsworth on Willam Rivera  being transferred to  for routine progression of care       Report consisted of patients Situation, Background, Assessment and   Recommendations(SBAR). Information from the following report(s) OR Summary, Procedure Summary, Intake/Output, MAR, Accordion, Recent Results, Med Rec Status and Cardiac Rhythm NS was reviewed with the receiving nurse. Lines:   Peripheral IV 03/23/22 Anterior;Distal;Right Wrist (Active)   Site Assessment Clean, dry, & intact 03/25/22 0908   Phlebitis Assessment 0 03/25/22 0908   Infiltration Assessment 0 03/25/22 0908   Dressing Status Clean, dry, & intact 03/25/22 0908   Dressing Type Transparent;Tape 03/25/22 0908   Hub Color/Line Status Patent 03/25/22 0908   Alcohol Cap Used Yes 03/24/22 0816        Opportunity for questions and clarification was provided. Patient transported with:   O2 @ 3 liters    VTE prophylaxis orders have been written for Willam Rivera. Patient and family given floor number and nurses name. Family updated re: pt status after security code verified.

## 2022-03-25 NOTE — PROGRESS NOTES
3/25/2022 3:37 PM    Admit Date: 3/21/2022    Admit Diagnosis: Closed compression fracture of lumbosacral spine (HCC) [S32.000A]      Subjective:   No chest pain or shortness of breath      Objective:      Visit Vitals  BP (!) 151/100   Pulse 70   Temp 98.3 °F (36.8 °C)   Resp 18   Ht 5' 4\" (1.626 m)   Wt 150 lb (68 kg)   SpO2 91%   BMI 25.75 kg/m²       Physical Exam:  Linford Morgans, Well Nourished, No Acute Distress, Alert & Oriented x 3, appropriate mood. Neck- supple, no JVD  CV- regular rate and rhythm no MRG  Lung- clear bilaterally  Abd- soft, nontender, nondistended  Ext- no edema bilaterally. Skin- warm and dry        Data Review:   Recent Labs     03/25/22  0543 03/24/22  0504 03/23/22  0840   NA  --   --  141   K  --   --  3.4*   BUN  --   --  9   CREA  --   --  0.80   INR 1.1   < >  --     < > = values in this interval not displayed. Assessment/Plan:     Principal Problem:    Vertebral fracture, osteoporotic ([address] (3/21/2022)    Active Problems:    Suspected sleep apnea (2/11/2021)      Longstanding persistent atrial fibrillation ([address] (3/8/2021)      CAD (coronary artery disease) (3/22/2022)Stable. Continue current medical therapy. No cv complications with anesthesia      HTN (hypertension) (3/22/2022) blood pressure little high post procedure.   We will monitor and make medicine adjustments as needed

## 2022-03-25 NOTE — ANESTHESIA POSTPROCEDURE EVALUATION
Procedure(s):  KYPHOPLASTY L3-L4.     general    Anesthesia Post Evaluation      Multimodal analgesia: multimodal analgesia not used between 6 hours prior to anesthesia start to PACU discharge  Patient location during evaluation: PACU  Patient participation: complete - patient participated  Level of consciousness: awake and alert  Pain management: adequate  Airway patency: patent  Anesthetic complications: no  Cardiovascular status: hemodynamically stable  Respiratory status: acceptable  Hydration status: acceptable        INITIAL Post-op Vital signs:   Vitals Value Taken Time   /84 03/25/22 0942   Temp 36.7 °C (98 °F) 03/25/22 0942   Pulse 63 03/25/22 0942   Resp 15 03/25/22 0942   SpO2 97 % 03/25/22 0942

## 2022-03-25 NOTE — BRIEF OP NOTE
Brief Postoperative Note    Patient: Stacey Lynn  YOB: 1940  MRN: [id number]    Date of Procedure: 3/25/2022     Pre-Op Diagnosis: Age-related osteoporosis with current pathological fracture of vertebra, initial encounter ([facility] [M80.08XA]    Post-Op Diagnosis: L3 & L4 fractures    Procedure(s):  KYPHOPLASTY L3-L4    Surgeon(s):  Lady Myrna MD    Surgical Assistant: None    Anesthesia: General     Estimated Blood Loss (mL):minimal    Complications: none    Specimens: * No specimens in log *     Implants:   Implant Name Type Inv.  Item Serial No.  Lot No. LRB No. Used Action   CEMENT BNE Cristobal Gómez - [id number]  CEMENT BNE Katherine RAM_ [id number] N/A 1 Implanted       Drains:   External Urinary Catheter 03/21/22 (Active)   Site Assessment Clean, dry, & intact 03/24/22 1730   Repositioned Yes 03/24/22 1730   Perineal Care Yes 03/24/22 1730   Wick Changed Yes 03/24/22 1730   Suction Canister/Tubing Changed No 03/24/22 1730   Urine Output (mL) 750 ml 03/24/22 1730       Findings: fracture    Electronically Signed by Yadira Mchugh MD on 3/25/2022 at 8:52 AM

## 2022-03-25 NOTE — DISCHARGE INSTRUCTIONS
KYPHOPLASTY DISCHARGE INSTRUCTIONS    General Information: This procedure is done to help with the back pain that is associated with compression fractures in the spine. The kyphoplasty involves placing a balloon into the space of the vertebrae that is fractured, blowing up the balloon, therefore realigning the broken pieces of bone, and then injecting cement into the space to strengthen the vertebrae. The pain experienced from compression fractures is caused by the vertebrae not being stabilized. The cement stabilizes the bone, therefore reducing the pain. Home Care Instructions: You can resume your regular diet and medication regimen. Do not drink alcohol, drive, or make any important legal decisions in the next 24 hours. Do not lift anything heavier than a gallon of milk, or do anything strenuous for the next 24 hours. You will notice a dressing on your lower back after your procedure. This dressing can be removed in 24 hours. Showering is acceptable in 24 hours, but you should refrain from tub baths or swimming for 5 days. Call If:     You should call your Physician if you have any bleeding other than a small spot on your bandage. Call if you have any signs of infection, fever, or increased pain at the site. Call if you should have new or worsening pain in your back, or if you lose control of your bladder or bowel. Any tingling or loss of feeling or movement in your legs should also be reported. Follow-Up Instructions: Please see your ordering doctor as he has requested.      To Reach Us:  [employer] [phone]

## 2022-03-25 NOTE — OP NOTES
[address] [place] [address]  OPERATIVE REPORT    Name:  Lizeth Pierre  MR#:  [id number]  :  1940  ACCOUNT #:  [de-identified]  DATE OF SERVICE:  2022    PREOPERATIVE DIAGNOSIS:  Osteoporotic L3 and L4 compression fractures. POSTOPERATIVE DIAGNOSIS:  Osteoporotic L3 and L4 compression fractures. PROCEDURE PERFORMED:  Bilateral L3 and L4 kyphoplasty with attempted bone biopsy and procedure was carried out using biplanar C-arm fluoroscopy imaging. CPT code 00672 and [id number]. SURGEON:  Vivia Paget, MD    ASSISTANT:  None. ANESTHESIA:  GETA. COMPLICATIONS:  None. SPECIMENS REMOVED:  None. IMPLANTS:  None. ESTIMATED BLOOD LOSS:  Minimal.    FLUIDS:  200 mL of crystalloid. DRAINS:  None. INDICATIONS:  The patient is an 67-year-old female who had seen in the office with intractable back pain following a fall with fractures of L3 and L4. She was scheduled for surgery, but she had fallen again at home and was in intractable pain and could not be gotten out of the floor, so she was just admitted to the hospital and she is now presenting for surgery. PROCEDURE:  The patient was brought to operating room. After administration of anesthesia, IV antibiotics and placement of monitoring lines, she was positioned prone on the [facility] frame. Her back was prepped with Betadine and sterilely draped. AP and lateral C-arms were brought in. We lined up the L3 and L4 vertebral bodies and pedicles on both AP and lateral views. At this point, surgeon called a time-out and confirmed the procedure to be performed, her allergy history and the fact she had received preoperative IV antibiotics. We marked the lateral aspect of the pedicle on the skin bilaterally. We made a stab wound about a fingerbreadth lateral to this chikis bilaterally at each level and inserted our starting trocars down to the lateral edge of the pedicle.   Checking on both AP and lateral views, we inserted the trocars down to the pedicle and anchored up into the posterior aspect of the vertebral body. We attempted a bone biopsy at every site bilaterally and no material was obtained. We drilled towards the anterior cortex bilaterally at each level and site as well. The bone felt extremely osteopenic. We then inserted our inflatable tamps and inflated and got very good filling of the vertebral body and restoration of normal endplate height. During this time, the methyl methacrylate was reconstituted and placed into the insertion devices. The inflatable tamps were then deflated and the insertion devices were inserted and we compressed the methyl methacrylate out into the void created by the bone tamps. We got a very good filling through the fracture area side-to-side all across the superior endplate. There was no extravasation seen. We let the cement harden and once it had, we removed the bone fillers and checked for tails and none were seen, so the trocar sleeves were removed. A final AP and lateral C-arm x-ray was obtained. We anesthetized the skin and subcutaneous tissue with a total of 30 mL of 0.5% Marcaine with epinephrine. Each skin incision was closed with a single stitch of 3-0 nylon. Dry sterile dressings were applied to each incision and the patient was rolled supine onto the recovery room bed having tolerated the procedure well.       Irene Alejandre MD      [id number]  D:  03/25/2022 9:15  T:  03/25/2022 11:04  JOB #:  [id number]

## 2022-03-25 NOTE — PROGRESS NOTES
TRANSFER - IN REPORT:    Verbal report received from MILTON Cespedes(name) on Thu Mcgee  being received from [employer]) for routine progression of care      Report consisted of patients Situation, Background, Assessment and   Recommendations(SBAR). Information from the following report(s) SBAR was reviewed with the receiving nurse. Opportunity for questions and clarification was provided. Assessment completed upon patients arrival to unit and care assumed.

## 2022-03-25 NOTE — PROGRESS NOTES
ACUTE PHYSICAL THERAPY GOALS:  (Developed with and agreed upon by patient and/or caregiver.)    (1.) Nuris Gordon  will move from supine to sit and sit to supine  with INDEPENDENCE within 7 treatment day(s). (2.) Nuris Gordon will transfer from bed to chair and chair to bed with MODIFIED INDEPENDENCE using the least restrictive device within 7 treatment day(s). (3.) Nuris Gordon will ambulate with MODIFIED INDEPENDENCE for 150 feet with the least restrictive device within 7 treatment day(s). (4.) Nuris Gordon will perform standing static and dynamic balance activities x 10 minutes with SUPERVISION to improve safety within 7 treatment day(s). (5.) Nuris Gordon will perform bilateral lower extremity exercises x 20 min for HEP with INDEPENDENCE to improve strength, endurance, and functional mobility within 7 treatment day(s).      PHYSICAL THERAPY ASSESSMENT: Initial Assessment, Daily Note and AM PT Treatment Day # 1      Nuris Gordon is a [de-identified] y.o. female   PRIMARY DIAGNOSIS: Vertebral fracture, osteoporotic (HCC)  Closed compression fracture of lumbosacral spine (HCC) [S32.000A]  Procedure(s) (LRB):  KYPHOPLASTY L3-L4 (N/A)  Day of Surgery  Reason for Referral:    ICD-10: Treatment Diagnosis: Difficulty in walking, Not elsewhere classified (R26.2)  History of falling (Z91.81)  Low Back Pain (M54.5)  [place] / [place] / Product Type: Medicare /     ASSESSMENT:     REHAB RECOMMENDATIONS:   Recommendation to date pending progress:  Setting:   Short-term Rehab   will likely progress to [facility], family prefers this  Equipment:    Rolling Walker     PRIOR LEVEL OF FUNCTION:  (Prior to Hospitalization) INITIAL/CURRENT LEVEL OF FUNCTION:  (Most Recently Demonstrated)   Bed Mobility:   Independent  Sit to Stand:   Independent  Transfers:   Modified Independent  Gait/Mobility:   Modified Independent Bed Mobility:   Minimal Assistance  Sit to Stand:   Minimal Assistance  Transfers:   Minimal Assistance  Gait/Mobility:   Minimal Assistance     ASSESSMENT:  Ms. Etienne Bardales  is a [de-identified]year old F who presents s/p L3-4 kyphoplasty, POD 0. Pt with 2 recent falls due to back pain, started using a rollator because of this. Pt lives alone in a [place] community, daughter is close by and able to assist. This date pt performs mobility including bed mobility via log roll technique with Carlo. Upon sitting pt became dizzy, /100, RN present and aware. STS practiced x 3, initially required modA but progressed to Carlo. She ambulated 3 ft and transferred to chair with RW and Carlo, unable to tolerate further ambulation. She tolerated sitting up ~10 mins before becoming dizzy again and requesting to go back to bed. Carlo for 3 ft and return to supine. Given pt's decreased activity tolerance and balance and history of falling she would benefit from STR. Pt and daughter prefer HH, hopefully pt can progress to this level with a few more acute therapy sessions. Pt presents as functioning below her baseline, with deficits in mobility including transfers, gait, balance, and activity tolerance. Pt will benefit from skilled therapy services to address stated deficits to promote return to highest level of function, independence, and safety. Will continue to follow.      SUBJECTIVE:   Ms. Etienne Bardales states, \"I feel victorious\"    SOCIAL HISTORY/LIVING ENVIRONMENT: PLOF: typically independent, started using rollator in recent weeks, lives alone in [place] community with daughter nearby and able to assist, elevator to enter house, 2 recent falls  Home Environment: Private residence  One/Two Story Residence: One story  Living Alone: No  Support Systems: Child(vikram),Other Family Member(s),Other (Comment) (lives in [facility] (lives independently-not an AMBER))  OBJECTIVE:     PAIN: VITAL SIGNS: LINES/DRAINS:   Pre Treatment: Pain Screen  Pain Scale 1: Numeric (0 - 10)  Pain Intensity 1: 0  Post Treatment: 0   Purewick  O2 Device: None (Room air)     GROSS EVALUATION:  B LE Within Functional Limits Abnormal/ Functional Abnormal/ Non-Functional (see comments) Not Tested Comments:   AROM [x] [] [] []    PROM [] [] [] [x]    Strength [] [x] [] [] Generalized weakness   Balance [] [x] [] [] Fair+ sitting, fair standing    Posture [] [x] [] [] Forward flexed    Sensation [x] [] [] []    Coordination [] [] [] [x]    Tone [] [] [] [x]    Edema [] [] [] [x]    Activity Tolerance [] [x] [] [] Fatigues easily     [] [] [] []      COGNITION/  PERCEPTION: Intact Impaired   (see comments) Comments:   Orientation [x] []    Vision [x] []    Hearing [x] []    Command Following [x] []    Safety Awareness [x] []     [] []      MOBILITY: I Mod I S SBA CGA Min Mod Max Total  NT x2 Comments:   Bed Mobility    Rolling [] [] [] [] [] [x] [] [] [] [] []    Supine to Sit [] [] [] [] [] [x] [] [] [] [] []    Scooting [] [] [] [] [] [x] [] [] [] [] []    Sit to Supine [] [] [] [] [] [x] [] [] [] [] []    Transfers    Sit to Stand [] [] [] [] [] [x] [] [] [] [] []    Bed to Chair [] [] [] [] [] [x] [] [] [] [] []    Stand to Sit [] [] [] [] [] [x] [] [] [] [] []    I=Independent, Mod I=Modified Independent, S=Supervision, SBA=Standby Assistance, CGA=Contact Guard Assistance,   Min=Minimal Assistance, Mod=Moderate Assistance, Max=Maximal Assistance, Total=Total Assistance, NT=Not Tested  GAIT: I Mod I S SBA CGA Min Mod Max Total  NT x2 Comments:   Level of Assistance [] [] [] [] [] [x] [] [] [] [] []    Distance 3 ft x 2    DME Rolling Walker and Gait Belt    Gait Quality Decreased gait speed, unsteady, fatigues quickly    Weightbearing Status N/A     I=Independent, Mod I=Modified Independent, S=Supervision, SBA=Standby Assistance, CGA=Contact Guard Assistance,   Min=Minimal Assistance, Mod=Moderate Assistance, Max=Maximal Assistance, Total=Total Assistance, NT=Not [facility] AM-PAC 6 Clicks   Basic Mobility Inpatient Short Form       How much difficulty does the patient currently have. .. Unable A Lot A Little None   1. Turning over in bed (including adjusting bedclothes, sheets and blankets)? [] 1   [] 2   [x] 3   [] 4   2. Sitting down on and standing up from a chair with arms ( e.g., wheelchair, bedside commode, etc.)   [] 1   [] 2   [x] 3   [] 4   3. Moving from lying on back to sitting on the side of the bed? [] 1   [] 2   [x] 3   [] 4   How much help from another person does the patient currently need. .. Total A Lot A Little None   4. Moving to and from a bed to a chair (including a wheelchair)? [] 1   [] 2   [x] 3   [] 4   5. Need to walk in hospital room? [] 1   [x] 2   [] 3   [] 4   6. Climbing 3-5 steps with a railing? [] 1   [x] 2   [] 3   [] 4   © 2007, Trustees of [address] [place], [place] [address], under license to [employer]. All rights reserved     Score:  Initial: 16 Most Recent: X (Date: -- )    Interpretation of Tool:  Represents activities that are increasingly more difficult (i.e. Bed mobility, Transfers, Gait). PLAN:   FREQUENCY/DURATION: PT Plan of Care: Daily for duration of hospital stay or until stated goals are met, whichever comes first.    PROBLEM LIST:   (Skilled intervention is medically necessary to address:)  1. Decreased ADL/Functional Activities  2. Decreased Activity Tolerance  3. Decreased Balance  4. Decreased Gait Ability  5. Decreased Strength  6. Decreased Transfer Abilities  7. Increased Pain   INTERVENTIONS PLANNED:   (Benefits and precautions of physical therapy have been discussed with the patient.)  1. Therapeutic Activity  2. Therapeutic Exercise/HEP  3. Neuromuscular Re-education  4. Gait Training  5. Education     TREATMENT:     EVALUATION: Moderate Complexity : (Untimed Charge)    TREATMENT:   ($$ Therapeutic Activity: 38-52 mins    )  Therapeutic Activity (42 Minutes):  Therapeutic activity included Rolling, Supine to Sit, Sit to Supine, Scooting, Transfer Training, Ambulation on level ground, Sitting balance  and Standing balance to improve functional Mobility, Strength and Activity tolerance.     TREATMENT GRID:  N/A    AFTER TREATMENT POSITION/PRECAUTIONS:  Bed, Needs within reach, RN notified and Visitors at bedside    INTERDISCIPLINARY COLLABORATION:  RN/PCT and RN Case Manager/     TOTAL TREATMENT DURATION:  PT Patient Time In/Time Out  Time In: [address]  Time Out: Jens Valerio PT

## 2022-03-25 NOTE — PROGRESS NOTES
Pt had kyphoplasty this morning. [place] met with pt/dtr to discuss dc planning and possible rehab placement. PT/OT evals pending. Pt/dtr have changed their minds and want the pt to dc home with [facility] therapy. Dtr plans to stay with the pt after dc. List of [facility] agencies provided for their review. SW will follow up with them later today for their preferred agency and proceed with the referral accordingly. Pt confirmed she has all needed DME in the home. SW following. 1515:  Pt/dtr requesting [facility] referral to [facility]. Referral submitted for PT/OT services. Care Management Interventions  PCP Verified by CM: Yes  Mode of Transport at Discharge:  Other (see comment) (daughter)  Transition of Care Consult (CM Consult): [address]: Yes  Discharge Durable Medical Equipment: No  Physical Therapy Consult: Yes  Occupational Therapy Consult: Yes  Speech Therapy Consult: No  Support Systems: Child(vikram),Other Family Member(s),Other (Comment) (lives in [facility] (lives independently-not an assisted))  Confirm Follow Up Transport: Family  The Plan for Transition of Care is Related to the Following Treatment Goals : Home health therapy services to improve pt's strength and functional abilities s/p kyphoplasty  The Patient and/or Patient Representative was Provided with a Choice of Provider and Agrees with the Discharge Plan?: Yes  Name of the Patient Representative Who was Provided with a Choice of Provider and Agrees with the Discharge Plan: Miriam/dtr  Freedom of Choice List was Provided with Basic Dialogue that Supports the Patient's Individualized Plan of Care/Goals, Treatment Preferences and Shares the Quality Data Associated with the Providers?: Yes  Discharge Location  Patient Expects to be Discharged to[de-identified] Home with [facility])

## 2022-03-26 ENCOUNTER — HOME HEALTH ADMISSION (OUTPATIENT)
Dept: HOME HEALTH SERVICES | Facility: HOME HEALTH | Age: 82
End: 2022-03-26
Payer: MEDICARE

## 2022-03-26 LAB
INR PPP: 1.1
PROTHROMBIN TIME: 15 SEC (ref 12.6–14.5)

## 2022-03-26 PROCEDURE — 74011250636 HC RX REV CODE- 250/636: Performed by: ORTHOPAEDIC SURGERY

## 2022-03-26 PROCEDURE — 99232 SBSQ HOSP IP/OBS MODERATE 35: CPT | Performed by: INTERNAL MEDICINE

## 2022-03-26 PROCEDURE — 97535 SELF CARE MNGMENT TRAINING: CPT

## 2022-03-26 PROCEDURE — 74011250637 HC RX REV CODE- 250/637: Performed by: ORTHOPAEDIC SURGERY

## 2022-03-26 PROCEDURE — 74011000258 HC RX REV CODE- 258: Performed by: ORTHOPAEDIC SURGERY

## 2022-03-26 PROCEDURE — 74011250637 HC RX REV CODE- 250/637: Performed by: INTERNAL MEDICINE

## 2022-03-26 PROCEDURE — 36415 COLL VENOUS BLD VENIPUNCTURE: CPT

## 2022-03-26 PROCEDURE — 97530 THERAPEUTIC ACTIVITIES: CPT

## 2022-03-26 PROCEDURE — 97165 OT EVAL LOW COMPLEX 30 MIN: CPT

## 2022-03-26 PROCEDURE — 85610 PROTHROMBIN TIME: CPT

## 2022-03-26 PROCEDURE — 74011000250 HC RX REV CODE- 250: Performed by: ORTHOPAEDIC SURGERY

## 2022-03-26 PROCEDURE — 65660000000 HC RM CCU STEPDOWN

## 2022-03-26 RX ORDER — METOPROLOL SUCCINATE 50 MG/1
50 TABLET, EXTENDED RELEASE ORAL DAILY
Status: DISCONTINUED | OUTPATIENT
Start: 2022-03-26 | End: 2022-03-29 | Stop reason: HOSPADM

## 2022-03-26 RX ADMIN — GABAPENTIN 100 MG: 100 CAPSULE ORAL at 16:54

## 2022-03-26 RX ADMIN — SODIUM CHLORIDE, PRESERVATIVE FREE 10 ML: 5 INJECTION INTRAVENOUS at 14:13

## 2022-03-26 RX ADMIN — METHOCARBAMOL TABLETS 500 MG: 500 TABLET, COATED ORAL at 16:54

## 2022-03-26 RX ADMIN — METHOCARBAMOL TABLETS 500 MG: 500 TABLET, COATED ORAL at 21:48

## 2022-03-26 RX ADMIN — HYDROMORPHONE HYDROCHLORIDE 2 MG: 2 TABLET ORAL at 21:48

## 2022-03-26 RX ADMIN — HYDRALAZINE HYDROCHLORIDE 20 MG: 20 INJECTION INTRAMUSCULAR; INTRAVENOUS at 00:03

## 2022-03-26 RX ADMIN — GABAPENTIN 100 MG: 100 CAPSULE ORAL at 09:12

## 2022-03-26 RX ADMIN — HYDROMORPHONE HYDROCHLORIDE 2 MG: 2 TABLET ORAL at 14:01

## 2022-03-26 RX ADMIN — METHOCARBAMOL TABLETS 500 MG: 500 TABLET, COATED ORAL at 09:12

## 2022-03-26 RX ADMIN — SODIUM CHLORIDE, PRESERVATIVE FREE 10 ML: 5 INJECTION INTRAVENOUS at 21:48

## 2022-03-26 RX ADMIN — METOPROLOL SUCCINATE 50 MG: 50 TABLET, EXTENDED RELEASE ORAL at 09:12

## 2022-03-26 RX ADMIN — LISINOPRIL 20 MG: 20 TABLET ORAL at 09:12

## 2022-03-26 RX ADMIN — SODIUM CHLORIDE, PRESERVATIVE FREE 5 ML: 5 INJECTION INTRAVENOUS at 05:55

## 2022-03-26 RX ADMIN — CEFAZOLIN SODIUM 1 G: 1 INJECTION, POWDER, FOR SOLUTION INTRAMUSCULAR; INTRAVENOUS at 09:12

## 2022-03-26 NOTE — PROGRESS NOTES
Problem: Falls - Risk of  Goal: *Absence of Falls  Description: Document Radha Dailey Fall Risk and appropriate interventions in the flowsheet.   Outcome: Progressing Towards Goal  Note: Fall Risk Interventions:  Mobility Interventions: Bed/chair exit alarm         Medication Interventions: Bed/chair exit alarm    Elimination Interventions: Bed/chair exit alarm    History of Falls Interventions: Bed/chair exit alarm         Problem: Patient Education: Go to Patient Education Activity  Goal: Patient/Family Education  Outcome: Progressing Towards Goal     Problem: Pain  Goal: *Control of Pain  Outcome: Progressing Towards Goal     Problem: Patient Education: Go to Patient Education Activity  Goal: Patient/Family Education  Outcome: Progressing Towards Goal     Problem: Patient Education: Go to Patient Education Activity  Goal: Patient/Family Education  Outcome: Progressing Towards Goal

## 2022-03-26 NOTE — PROGRESS NOTES
Orthopedic Joint Progress Note    2022  Admit Date: 3/21/2022  Admit Diagnosis: Closed compression fracture of lumbosacral spine (HCC) [S32.000A]    1 Day Post-Op    Subjective:     Nettie Shepherd  doing well. Pain well-controlled. Denies CP, SOB, N/V, abd pain. Review of Systems: Pertinent items are noted in HPI. Objective:     PT/OT:     PATIENT MOBILITY                           Vital Signs:    Blood pressure (!) 153/70, pulse 83, temperature 97.2 °F (36.2 °C), resp. rate 20, height 5' 4\" (1.626 m), weight 150 lb (68 kg), SpO2 97 %.   Temp (24hrs), Av.8 °F (36.6 °C), Min:97.2 °F (36.2 °C), Max:98.3 °F (36.8 °C)      Pain Control:   Pain Assessment  Pain Scale 1: Numeric (0 - 10)  Pain Intensity 1: 0  Pain Onset 1: postop  Pain Location 1: Back,Hip  Pain Orientation 1: Lower,Right  Pain Description 1: Aching  Pain Intervention(s) 1: Medication (see MAR)    Meds:  Current Facility-Administered Medications   Medication Dose Route Frequency    metoprolol succinate (TOPROL-XL) XL tablet 50 mg  50 mg Oral DAILY    ceFAZolin (ANCEF) 1 g in 0.9% sodium chloride (MBP/ADV) 50 mL MBP  1 g IntraVENous Q8H    phenol throat spray (CHLORASEPTIC) 1 Spray  1 Spray Oral PRN    lisinopriL (PRINIVIL, ZESTRIL) tablet 20 mg  20 mg Oral DAILY    hydrALAZINE (APRESOLINE) 20 mg/mL injection 20 mg  20 mg IntraVENous Q4H PRN    gabapentin (NEURONTIN) capsule 100 mg  100 mg Oral BID    methocarbamoL (ROBAXIN) tablet 500 mg  500 mg Oral TID    traMADoL (ULTRAM) tablet 50 mg  50 mg Oral Q6H PRN    sodium chloride (NS) flush 5-40 mL  5-40 mL IntraVENous Q8H    sodium chloride (NS) flush 5-40 mL  5-40 mL IntraVENous PRN    acetaminophen (TYLENOL) tablet 650 mg  650 mg Oral Q6H PRN    Or    acetaminophen (TYLENOL) suppository 650 mg  650 mg Rectal Q6H PRN    polyethylene glycol (MIRALAX) packet 17 g  17 g Oral DAILY PRN    ondansetron (ZOFRAN ODT) tablet 4 mg  4 mg Oral Q8H PRN    Or    ondansetron (ZOFRAN) injection 4 mg  4 mg IntraVENous Q6H PRN    HYDROmorphone (DILAUDID) injection 0.5 mg  0.5 mg IntraVENous Q3H PRN    HYDROmorphone (DILAUDID) tablet 2 mg  2 mg Oral Q4H PRN        LAB:    Lab Results   Component Value Date/Time    INR 1.1 03/26/2022 05:08 AM    INR 1.1 03/25/2022 05:43 AM    INR 1.2 03/24/2022 05:04 AM     Lab Results   Component Value Date/Time    HGB 10.9 (L) 03/21/2022 08:07 PM    HGB 11.5 (L) 11/19/2021 02:09 PM    HGB 12.1 07/28/2021 11:29 AM       Wound Hand Left (Active)   Number of days: 611       Incision 03/25/22 Back (Active)   Dressing Status Clean;Dry; Intact; Old drainage noted 03/25/22 1945   Dressing/Treatment Tegaderm/Transparent film dressing 03/25/22 1945   Drainage Amount Scant 03/25/22 1945   Drainage Description Serosanguinous 03/25/22 1945   Wound Odor None 03/25/22 1945   Number of days: 1         Physical Exam:  No significant changes    Assessment:      Principal Problem:    Vertebral fracture, osteoporotic ([address] (3/21/2022)    Active Problems:    Suspected sleep apnea (2/11/2021)      Longstanding persistent atrial fibrillation ([address] (3/8/2021)      CAD (coronary artery disease) (3/22/2022)      HTN (hypertension) (3/22/2022)         Plan:     Patient is stable. Continue PT/OT/Rehab  Continue medical management per hospitalist.   Continue to monitor   D/c when cleared by cardiology and PT.            Patient Expects to be Discharged to[de-identified] Home with [facility]

## 2022-03-26 NOTE — PROGRESS NOTES
ACUTE OT GOALS:  (Developed with and agreed upon by patient and/or caregiver.)  1. Patient will verbalize and demonstrate understanding of spinal precautions with 100% accuracy during ADLs. 2. Patient will complete lower body bathing and dressing with SUPERVISION and adaptive equipment as needed. 3. Patient will complete functional transfers with SUPERVISION and adaptive equipment as needed. 4. Patient will complete toileting and toilet transfer with SUPERVISION. 5. Patient will complete functional mobility of household distances with SUPERVISION and adaptive equipment as needed.    6. Patient will demonstrate ability to log roll in bed INDEPENDENTLY    Timeframe: 7 visits     OCCUPATIONAL THERAPY ASSESSMENT: Initial Assessment and Daily Note OT Treatment Day # 1    Willam Rivera is a [de-identified] y.o. female   PRIMARY DIAGNOSIS: Vertebral fracture, osteoporotic (HCC)  Closed compression fracture of lumbosacral spine (HCC) [S32.000A]  Procedure(s) (LRB):  KYPHOPLASTY L3-L4 (N/A)  1 Day Post-Op  Reason for Referral:    ICD-10: Treatment Diagnosis: Generalized Muscle Weakness (M62.81)  Difficulty in walking, Not elsewhere classified (R26.2)  Other abnormalities of gait and mobility (R26.89)  History of falling (Z91.81)  Low Back Pain (M54.5)  [place] / Product Type: Medicare /   ASSESSMENT:     REHAB RECOMMENDATIONS:   Recommendation to date pending progress:  Setting:   Short-term Rehab    Will likely progress to [facility], daughter will be with pt 24/7  Equipment:    Rolling Walker    Pt has BSC and rollator     PRIOR LEVEL OF FUNCTION:  (Prior to Hospitalization)  INITIAL/CURRENT LEVEL OF FUNCTION:  (Based on today's evaluation)   Bathing:   Modified Independent  Dressing:   Independent  Feeding/Grooming:   Independent  Toileting:   Modified Independent raised toilet seat  Functional Mobility:   Modified Independent rollator Bathing:   Minimal Assistance  Dressing:   Minimal Assistance  Feeding/Grooming:   Contact Guard Assistance  Toileting:   Contact Guard Assistance  Functional Mobility:   Contact Guard Assistance     ASSESSMENT:  Ms. Christiano Rush is an [de-identified] y/o female presents s/p L3-L4 kyphoplasty and educated on spinal precautions to minimize pain with ADLs and mobility. Today pt presents with decreased activity tolerance, strength and mobility impacting ADLs. Pt overall CGA RW for functional transfers and mobility of household distances. Pt practiced toilet transfer and walk in shower transfer with CGA RW and increased time. Dtr present for education purposes. Pt noted to ambulate slowly and cautiously. Pt fatigues quickly and requested to return to supine after functional transfers. Pt with RLE throbbing pain radiating from back, RN notified. Pt is currently functioning below baseline and would benefit from skilled OT services to address OT goals and plan of care.       SUBJECTIVE:   Ms. Christiano Rush states, \"My leg is starting to hurt\"    SOCIAL HISTORY/LIVING ENVIRONMENT: lives alone but will be d/c with daughter, one level, walk in shower w/ BSC as SC--pt also uses BSC over toilet for raised toilet seat  Home Environment: Private residence  One/Two Story Residence: One story  Living Alone: No  Support Systems: Child(vikram),Other Family Member(s),Other (Comment) (lives in [facility] (lives independently-not an AMBER))    OBJECTIVE:     PAIN: VITAL SIGNS: LINES/DRAINS:   Pre Treatment: Pain Screen  Pain Scale 1: Numeric (0 - 10)  Pain Intensity 1: 4  Pain Onset 1: with mobility  Pain Location 1: Back;Leg  Pain Orientation 1: Lower;Right  Pain Description 1: Throbbing  Pain Intervention(s) 1: Nurse notified  Post Treatment: same, RN notified   None  O2 Device: None (Room air)     GROSS EVALUATION:  BUE Within Functional Limits Abnormal/ Functional Abnormal/ Non-Functional (see comments) Not Tested Comments:   AROM [] [x] [] [] PROM [] [x] [] []    Strength [] [x] [] [] Generally decreased   Balance [] [x] [] [] Sitting: good  Standing: fair+   Posture [] [x] [] [] Forward leaning with RW   Sensation [x] [] [] []    Coordination [x] [] [] []    Tone [x] [] [] []    Edema [x] [] [] []    Activity Tolerance [] [x] [] [] Fatigues quickly and requires RB    [] [] [] []      COGNITION/  PERCEPTION: Intact Impaired   (see comments) Comments:   Orientation [x] []    Vision [x] []    Hearing [x] []    Judgment/ Insight [x] []    Attention [x] []    Memory [x] []    Command Following [x] []    Emotional Regulation [x] []     [] []      ACTIVITIES OF DAILY LIVING: I Mod I S SBA CGA Min Mod Max Total NT Comments   BASIC ADLs:              Bathing/ Showering [] [] [] [] [x] [] [] [] [] [] RW for walk in shower transfer   Toileting [] [] [] [] [x] [] [] [] [] [] RW for toilet transfer   Dressing [] [] [] [x] [] [] [] [] [] [] For balance EOB pulling up socks   Feeding [] [] [] [] [] [] [] [] [] [x]    Grooming [] [] [] [] [] [] [] [] [] [x]    Personal Device Care [] [] [] [] [] [] [] [] [] [x]    Functional Mobility [] [] [] [] [x] [] [] [] [] [] RW   I=Independent, Mod I=Modified Independent, S=Supervision, SBA=Standby Assistance, CGA=Contact Guard Assistance,   Min=Minimal Assistance, Mod=Moderate Assistance, Max=Maximal Assistance, Total=Total Assistance, NT=Not Tested    MOBILITY: I Mod I S SBA CGA Min Mod Max Total  NT x2 Comments:   Supine to sit [] [] [] [] [] [x] [] [] [] [] [] Log roll   Sit to supine [] [] [] [] [] [x] [] [] [] [] [] Reverse log roll   Sit to stand [] [] [] [] [x] [] [] [] [] [] [] RW   Bed to chair [] [] [] [] [x] [] [] [] [] [] [] RW   I=Independent, Mod I=Modified Independent, S=Supervision, SBA=Standby Assistance, CGA=Contact Guard Assistance,   Min=Minimal Assistance, Mod=Moderate Assistance, Max=Maximal Assistance, Total=Total Assistance, NT=Not Tested    [employer] AM-PAC 6 Clicks   Daily Activity Inpatient Short Form        How much help from another person does the patient currently need. .. Total A Lot A Little None   1. Putting on and taking off regular lower body clothing? [] 1   [] 2   [x] 3   [] 4   2. Bathing (including washing, rinsing, drying)? [] 1   [] 2   [x] 3   [] 4   3. Toileting, which includes using toilet, bedpan or urinal?   [] 1   [] 2   [x] 3   [] 4   4. Putting on and taking off regular upper body clothing? [] 1   [] 2   [x] 3   [] 4   5. Taking care of personal grooming such as brushing teeth? [] 1   [] 2   [x] 3   [] 4   6. Eating meals? [] 1   [] 2   [] 3   [x] 4   © 2007, Trustees of Claudia Piedra, under license to [employer]. All rights reserved     Score:  Initial: 19 Most Recent: X (Date: -- )   Interpretation of Tool:  Represents activities that are increasingly more difficult (i.e. Bed mobility, Transfers, Gait). PLAN:   FREQUENCY/DURATION: OT Plan of Care: 3 times/week for duration of hospital stay or until stated goals are met, whichever comes first.    PROBLEM LIST:   (Skilled intervention is medically necessary to address:)  1. Decreased ADL/Functional Activities  2. Decreased Activity Tolerance  3. Decreased AROM/PROM  4. Decreased Balance  5. Decreased Gait Ability  6. Decreased Strength  7. Decreased Transfer Abilities  8. Increased Pain   INTERVENTIONS PLANNED:   (Benefits and precautions of occupational therapy have been discussed with the patient.)  1. Self Care Training  2. Therapeutic Activity  3. Therapeutic Exercise/HEP  4. Neuromuscular Re-education  5.  Education     TREATMENT:     EVALUATION: Low Complexity : (Untimed Charge)    TREATMENT:   ($$ Self Care/Home Management: 23-37 mins    )  Self Care (25 Minutes): Self care including Toileting, ADL Adaptive Equipment Training, Energy Conservation Training and walk in shower transfer and ambulating household distances to increase independence and decrease level of assistance required.     TREATMENT GRID:  N/A    AFTER TREATMENT POSITION/PRECAUTIONS:  Bed, Needs within reach, RN notified and Visitors at bedside    INTERDISCIPLINARY COLLABORATION:  RN/PCT and OT/NASSAR    TOTAL TREATMENT DURATION:  OT Patient Time In/Time Out  Time In: 1310  Time Out: [address], OT

## 2022-03-26 NOTE — PROGRESS NOTES
3/26/2022 9:42 AM    Admit Date: 3/21/2022    Admit Diagnosis: Closed compression fracture of lumbosacral spine (HCC) [S32.000A]      Subjective:   No chest pain or shortness of breath. Back continues to cause pain      Objective:      Visit Vitals  BP (!) 153/70 (BP 1 Location: Right arm, BP Patient Position: At rest)   Pulse 83   Temp 97.2 °F (36.2 °C)   Resp 20   Ht 5' 4\" (1.626 m)   Wt 150 lb (68 kg)   SpO2 97%   BMI 25.75 kg/m²       Physical Exam:  Urszula Sirenao, Well Nourished, No Acute Distress, Alert & Oriented x 3, appropriate mood. Neck- supple, no JVD  CV- regular rate and rhythm no MRG  Lung- clear bilaterally  Abd- soft, nontender, nondistended  Ext- no edema bilaterally. Skin- warm and dry        Data Review:   Recent Labs     03/26/22  0508   INR 1.1       Assessment/Plan:     Principal Problem:    Vertebral fracture, osteoporotic ([address] (3/21/2022)    Active Problems:    Suspected sleep apnea (2/11/2021)      Longstanding persistent atrial fibrillation ([address] (3/8/2021)      CAD (coronary artery disease) (3/22/2022) stable no anginal symptoms. Continue current medical therapy. HTN (hypertension) (3/22/2022) blood pressure is consistently elevated.   Will increase Toprol to 50 mg a day

## 2022-03-26 NOTE — PROGRESS NOTES
ACUTE PHYSICAL THERAPY GOALS:  (Developed with and agreed upon by patient and/or caregiver.)      (1.) Coni Barrera  will move from supine to sit and sit to supine  with INDEPENDENCE within 7 treatment day(s). (2.) Coni Barrera will transfer from bed to chair and chair to bed with MODIFIED INDEPENDENCE using the least restrictive device within 7 treatment day(s). (3.) Coni Barrera will ambulate with MODIFIED INDEPENDENCE for 150 feet with the least restrictive device within 7 treatment day(s). (4.) Coni Barrera will perform standing static and dynamic balance activities x 10 minutes with SUPERVISION to improve safety within 7 treatment day(s). (5.) Coni Barrera will perform bilateral lower extremity exercises x 20 min for HEP with INDEPENDENCE to improve strength, endurance, and functional mobility within 7 treatment day(s). PHYSICAL THERAPY: Daily Note and AM Treatment Day # 2    Coni Barrera is a [de-identified] y.o. female   PRIMARY DIAGNOSIS: Vertebral fracture, osteoporotic (HCC)  Closed compression fracture of lumbosacral spine (HCC) [S32.000A]  Procedure(s) (LRB):  KYPHOPLASTY L3-L4 (N/A)  1 Day Post-Op    ASSESSMENT:     REHAB RECOMMENDATIONS: CURRENT LEVEL OF FUNCTION:  (Most Recently Demonstrated)   Recommendation to date pending progress:  [address] [place] [address]  Equipment:    Rolling Walker Bed Mobility:   Minimal Assistance  Sit to Stand:   Contact Guard Assistance  Transfers:   Contact Guard Assistance  Gait/Mobility:   Contact Guard Assistance - min     ASSESSMENT:  Ms. Marley Farnsworth is making good progress toward her goals by requiring less assistance and amb a farther distance. She got to the chair and sat several minutes before standing and amb into hallway. Returned to room and performed ex,     SUBJECTIVE:   Ms. Marley Farnsworth states \"I'm not feeling dizzy today\".     SOCIAL HISTORY/ LIVING ENVIRONMENT: PLOF: typically independent, started using rollator in recent   weeks, lives alone in [facility] community with daughter nearby and able to assist, elevator to enter house,   2 recent falls  Home Environment: Private residence  [address] [place] [address] St: One story  Living Alone: No  Support Systems: Child(vikram),Other Family Member(s),Other (Comment) (lives in [facility] (lives independently-not an correction))  OBJECTIVE:     PAIN: VITAL SIGNS: LINES/DRAINS:   Pre Treatment:  5/10  Post Treatment: 5/10   IV  O2 Device: None (Room air)     MOBILITY: I Mod I S SBA CGA Min Mod Max Total  NT x2 Comments:   Bed Mobility    Rolling [] [] [] [] [] [x] [] [] [] [] []    Supine to Sit [] [] [] [] [] [x] [] [] [] [] []    Scooting [] [] [] [] [x] [] [] [] [] [] []    Sit to Supine [] [] [] [] [] [] [] [] [] [x] []    Transfers    Sit to Stand [] [] [] [] [x] [] [] [] [] [] []    Bed to Chair [] [] [] [] [x] [] [] [] [] [] []    Stand to Sit [] [] [] [] [x] [] [] [] [] [] []    I=Independent, Mod I=Modified Independent, S=Supervision, SBA=Standby Assistance, CGA=Contact Guard Assistance,   Min=Minimal Assistance, Mod=Moderate Assistance, Max=Maximal Assistance, Total=Total Assistance, NT=Not Tested    BALANCE: Good Fair+ Fair Fair- Poor NT Comments   Sitting Static [x] [] [] [] [] []    Sitting Dynamic [x] [] [] [] [] []              Standing Static [] [x] [] [] [] []    Standing Dynamic [] [x] [x] [] [] []      GAIT: I Mod I S SBA CGA Min Mod Max Total  NT x2 Comments:   Level of Assistance [] [] [] [] [x] [x] [] [] [] [] []    Distance 39'    DME Rolling Walker and Gait Belt    Gait Quality slow    Weightbearing  Status N/A     I=Independent, Mod I=Modified Independent, S=Supervision, SBA=Standby Assistance, CGA=Contact Guard Assistance,   Min=Minimal Assistance, Mod=Moderate Assistance, Max=Maximal Assistance, Total=Total Assistance, NT=Not Tested    PLAN:   FREQUENCY/DURATION: PT Plan of Care: Daily for duration of hospital stay or until stated goals are met, whichever comes first.  TREATMENT:     TREATMENT:   ($$ Therapeutic Activity: 23-37 mins    )  Therapeutic Activity (25 Minutes): Therapeutic activity included Rolling, Supine to Sit, Scooting, Transfer Training, Ambulation on level ground, Sitting balance  and Standing balance to improve functional Mobility, Strength and Activity tolerance.     TREATMENT GRID:  N/A    AFTER TREATMENT POSITION/PRECAUTIONS:  Alarm Activated, Chair, Needs within reach and Visitors at bedside    INTERDISCIPLINARY COLLABORATION:  RN/PCT and PT/PTA    TOTAL TREATMENT DURATION:  PT Patient Time In/Time Out  Time In: 1030  Time Out: [address], PTA

## 2022-03-27 LAB
INR PPP: 1.1
PROTHROMBIN TIME: 15 SEC (ref 12.6–14.5)

## 2022-03-27 PROCEDURE — 65660000000 HC RM CCU STEPDOWN

## 2022-03-27 PROCEDURE — 97530 THERAPEUTIC ACTIVITIES: CPT

## 2022-03-27 PROCEDURE — 36415 COLL VENOUS BLD VENIPUNCTURE: CPT

## 2022-03-27 PROCEDURE — 99024 POSTOP FOLLOW-UP VISIT: CPT | Performed by: PHYSICIAN ASSISTANT

## 2022-03-27 PROCEDURE — 85610 PROTHROMBIN TIME: CPT

## 2022-03-27 PROCEDURE — 74011250637 HC RX REV CODE- 250/637: Performed by: ORTHOPAEDIC SURGERY

## 2022-03-27 PROCEDURE — 74011250637 HC RX REV CODE- 250/637: Performed by: INTERNAL MEDICINE

## 2022-03-27 PROCEDURE — 74011000250 HC RX REV CODE- 250: Performed by: ORTHOPAEDIC SURGERY

## 2022-03-27 RX ORDER — LISINOPRIL 20 MG/1
20 TABLET ORAL 2 TIMES DAILY
Status: DISCONTINUED | OUTPATIENT
Start: 2022-03-27 | End: 2022-03-29 | Stop reason: HOSPADM

## 2022-03-27 RX ADMIN — LISINOPRIL 20 MG: 20 TABLET ORAL at 08:51

## 2022-03-27 RX ADMIN — METHOCARBAMOL TABLETS 500 MG: 500 TABLET, COATED ORAL at 08:50

## 2022-03-27 RX ADMIN — METHOCARBAMOL TABLETS 500 MG: 500 TABLET, COATED ORAL at 17:37

## 2022-03-27 RX ADMIN — SODIUM CHLORIDE, PRESERVATIVE FREE 10 ML: 5 INJECTION INTRAVENOUS at 05:27

## 2022-03-27 RX ADMIN — METOPROLOL SUCCINATE 50 MG: 50 TABLET, EXTENDED RELEASE ORAL at 08:51

## 2022-03-27 RX ADMIN — GABAPENTIN 100 MG: 100 CAPSULE ORAL at 08:50

## 2022-03-27 RX ADMIN — LISINOPRIL 20 MG: 20 TABLET ORAL at 17:38

## 2022-03-27 RX ADMIN — METHOCARBAMOL TABLETS 500 MG: 500 TABLET, COATED ORAL at 23:21

## 2022-03-27 RX ADMIN — GABAPENTIN 100 MG: 100 CAPSULE ORAL at 17:38

## 2022-03-27 RX ADMIN — TRAMADOL HYDROCHLORIDE 50 MG: 50 TABLET, COATED ORAL at 17:38

## 2022-03-27 RX ADMIN — HYDROMORPHONE HYDROCHLORIDE 2 MG: 2 TABLET ORAL at 07:25

## 2022-03-27 NOTE — PROGRESS NOTES
Orthopedic Joint Progress Note    2022  Admit Date: 3/21/2022  Admit Diagnosis: Closed compression fracture of lumbosacral spine (HCC) [S32.000A]    2 Day Post-Op    Subjective:     Thu Mcgee  doing well. R Leg pain has improved. Pain well-controlled. Denies CP, SOB, N/V, abd pain. States she feels ready to go home. Review of Systems: Pertinent items are noted in HPI. Objective:     PT/OT:     PATIENT MOBILITY                           Vital Signs:    Blood pressure (!) 178/81, pulse 69, temperature 98.2 °F (36.8 °C), resp. rate 20, height 5' 4\" (1.626 m), weight 150 lb (68 kg), SpO2 94 %.   Temp (24hrs), Av °F (36.7 °C), Min:97.2 °F (36.2 °C), Max:98.5 °F (36.9 °C)      Pain Control:   Pain Assessment  Pain Scale 1: Numeric (0 - 10)  Pain Intensity 1: 9  Pain Onset 1: ACUTE  Pain Location 1: Back  Pain Orientation 1: Lower  Pain Description 1: Aching  Pain Intervention(s) 1: Medication (see MAR)    Meds:  Current Facility-Administered Medications   Medication Dose Route Frequency    lisinopriL (PRINIVIL, ZESTRIL) tablet 20 mg  20 mg Oral BID    metoprolol succinate (TOPROL-XL) XL tablet 50 mg  50 mg Oral DAILY    phenol throat spray (CHLORASEPTIC) 1 Spray  1 Spray Oral PRN    hydrALAZINE (APRESOLINE) 20 mg/mL injection 20 mg  20 mg IntraVENous Q4H PRN    gabapentin (NEURONTIN) capsule 100 mg  100 mg Oral BID    methocarbamoL (ROBAXIN) tablet 500 mg  500 mg Oral TID    traMADoL (ULTRAM) tablet 50 mg  50 mg Oral Q6H PRN    sodium chloride (NS) flush 5-40 mL  5-40 mL IntraVENous Q8H    sodium chloride (NS) flush 5-40 mL  5-40 mL IntraVENous PRN    acetaminophen (TYLENOL) tablet 650 mg  650 mg Oral Q6H PRN    Or    acetaminophen (TYLENOL) suppository 650 mg  650 mg Rectal Q6H PRN    polyethylene glycol (MIRALAX) packet 17 g  17 g Oral DAILY PRN    ondansetron (ZOFRAN ODT) tablet 4 mg  4 mg Oral Q8H PRN    Or    ondansetron (ZOFRAN) injection 4 mg  4 mg IntraVENous Q6H PRN    HYDROmorphone (DILAUDID) injection 0.5 mg  0.5 mg IntraVENous Q3H PRN    HYDROmorphone (DILAUDID) tablet 2 mg  2 mg Oral Q4H PRN        LAB:    Lab Results   Component Value Date/Time    INR 1.1 03/27/2022 05:32 AM    INR 1.1 03/26/2022 05:08 AM    INR 1.1 03/25/2022 05:43 AM     Lab Results   Component Value Date/Time    HGB 10.9 (L) 03/21/2022 08:07 PM    HGB 11.5 (L) 11/19/2021 02:09 PM    HGB 12.1 07/28/2021 11:29 AM       Wound Hand Left (Active)   Number of days: 611       Incision 03/25/22 Back (Active)   Dressing Status Clean;Dry; Intact; Old drainage noted 03/25/22 1945   Dressing/Treatment Tegaderm/Transparent film dressing 03/25/22 1945   Drainage Amount Scant 03/25/22 1945   Drainage Description Serosanguinous 03/25/22 1945   Wound Odor None 03/25/22 1945   Number of days: 1         Physical Exam:  No significant changes    Assessment:      Principal Problem:    Vertebral fracture, osteoporotic ([address] (3/21/2022)    Active Problems:    Suspected sleep apnea (2/11/2021)      Longstanding persistent atrial fibrillation ([address] (3/8/2021)      CAD (coronary artery disease) (3/22/2022)      HTN (hypertension) (3/22/2022)         Plan:     Patient is stable. Explained to patient she cannot go home until cleared by PT/OT when goals are met and they feel she is able to safely go home. Continue PT/OT/Rehab  Continue to monitor   D/c when cleared by cardiology and PT.            Patient Expects to be Discharged to[de-identified] Home with [facility])

## 2022-03-27 NOTE — PROGRESS NOTES
ACUTE PHYSICAL THERAPY GOALS:  (Developed with and agreed upon by patient and/or caregiver.)      (1.) Candice Tom  will move from supine to sit and sit to supine  with INDEPENDENCE within 7 treatment day(s). (2.) Candice Tom will transfer from bed to chair and chair to bed with MODIFIED INDEPENDENCE using the least restrictive device within 7 treatment day(s). (3.) Candice Tom will ambulate with MODIFIED INDEPENDENCE for 150 feet with the least restrictive device within 7 treatment day(s). (4.) Candice Tom will perform standing static and dynamic balance activities x 10 minutes with SUPERVISION to improve safety within 7 treatment day(s). (5.) Candice Tom will perform bilateral lower extremity exercises x 20 min for HEP with INDEPENDENCE to improve strength, endurance, and functional mobility within 7 treatment day(s). PHYSICAL THERAPY: Daily Note and PM Treatment Day # 3    Candice Tom is a [de-identified] y.o. female   PRIMARY DIAGNOSIS: Vertebral fracture, osteoporotic (HCC)  Closed compression fracture of lumbosacral spine (HCC) [S32.000A]  Procedure(s) (LRB):  KYPHOPLASTY L3-L4 (N/A)  2 Days Post-Op    ASSESSMENT:     REHAB RECOMMENDATIONS: CURRENT LEVEL OF FUNCTION:  (Most Recently Demonstrated)   Recommendation to date pending progress:  [address] [place] [address]  Equipment:    Rolling Walker Bed Mobility:   Minimal Assistance  Sit to Stand:   Contact Guard Assistance  Transfers:   Contact Guard Assistance  Gait/Mobility:   Contact Guard Assistance      ASSESSMENT:  Ms. Je Funez is making good progress toward her goals by amb a farther distance. She was fatigued after 150'. Returned to room and to chair to perform ex. SUBJECTIVE:   Ms. Je Funez states \"Ok. I'm good\".     SOCIAL HISTORY/ LIVING ENVIRONMENT: PLOF: typically independent, started using rollator in recent   weeks, lives alone in prison community with daughter nearby and able to assist, elevator to enter house,   2 recent falls  Home Environment: Private residence  [address] [place] [address] St: One story  Living Alone: No  Support Systems: Child(vikram),Other Family Member(s),Other (Comment) (lives in [facility] (lives independently-not an AMBER))  OBJECTIVE:     PAIN: VITAL SIGNS: LINES/DRAINS:   Pre Treatment:    Post Treatment:    IV  O2 Device: None (Room air)     MOBILITY: I Mod I S SBA CGA Min Mod Max Total  NT x2 Comments:   Bed Mobility    Rolling [] [] [] [] [] [x] [] [] [] [] []    Supine to Sit [] [] [] [] [] [x] [] [] [] [] []    Scooting [] [] [] [] [x] [] [] [] [] [] []    Sit to Supine [] [] [] [] [] [] [] [] [] [x] []    Transfers    Sit to Stand [] [] [] [] [x] [] [] [] [] [] []    Bed to Chair [] [] [] [] [x] [] [] [] [] [] []    Stand to Sit [] [] [] [] [x] [] [] [] [] [] []    I=Independent, Mod I=Modified Independent, S=Supervision, SBA=Standby Assistance, CGA=Contact Guard Assistance,   Min=Minimal Assistance, Mod=Moderate Assistance, Max=Maximal Assistance, Total=Total Assistance, NT=Not Tested    BALANCE: Good Fair+ Fair Fair- Poor NT Comments   Sitting Static [x] [] [] [] [] []    Sitting Dynamic [x] [] [] [] [] []              Standing Static [] [x] [] [] [] []    Standing Dynamic [] [x] [x] [] [] []      GAIT: I Mod I S SBA CGA Min Mod Max Total  NT x2 Comments:   Level of Assistance [] [] [] [] [x] [x] [] [] [] [] []    Distance 150'    DME Rolling Walker    Gait Quality slow    Weightbearing  Status N/A     I=Independent, Mod I=Modified Independent, S=Supervision, SBA=Standby Assistance, CGA=Contact Guard Assistance,   Min=Minimal Assistance, Mod=Moderate Assistance, Max=Maximal Assistance, Total=Total Assistance, NT=Not Tested    PLAN:   FREQUENCY/DURATION: PT Plan of Care: Daily for duration of hospital stay or until stated goals are met, whichever comes first.  TREATMENT:     TREATMENT:   ($$ Therapeutic Activity: 23-37 mins    )  Therapeutic Activity (23 Minutes): Therapeutic activity included Rolling, Supine to Sit, Scooting, Transfer Training, Ambulation on level ground, Sitting balance  and Standing balance to improve functional Mobility, Strength and Activity tolerance.     TREATMENT GRID:  N/A    AFTER TREATMENT POSITION/PRECAUTIONS:  Alarm Activated, Chair and Needs within reach    INTERDISCIPLINARY COLLABORATION:  RN/PCT and PT/PTA    TOTAL TREATMENT DURATION:  PT Patient Time In/Time Out  Time In: 1409  Time Out: [address], PTA

## 2022-03-28 LAB
INR PPP: 1.1
PROTHROMBIN TIME: 14.6 SEC (ref 12.6–14.5)

## 2022-03-28 PROCEDURE — 74011250637 HC RX REV CODE- 250/637: Performed by: ORTHOPAEDIC SURGERY

## 2022-03-28 PROCEDURE — 97535 SELF CARE MNGMENT TRAINING: CPT

## 2022-03-28 PROCEDURE — 97530 THERAPEUTIC ACTIVITIES: CPT

## 2022-03-28 PROCEDURE — 74011250637 HC RX REV CODE- 250/637: Performed by: INTERNAL MEDICINE

## 2022-03-28 PROCEDURE — 99231 SBSQ HOSP IP/OBS SF/LOW 25: CPT | Performed by: INTERNAL MEDICINE

## 2022-03-28 PROCEDURE — 77030038269 HC DRN EXT URIN PURWCK BARD -A

## 2022-03-28 PROCEDURE — 74011000250 HC RX REV CODE- 250: Performed by: ORTHOPAEDIC SURGERY

## 2022-03-28 PROCEDURE — 36415 COLL VENOUS BLD VENIPUNCTURE: CPT

## 2022-03-28 PROCEDURE — 65660000000 HC RM CCU STEPDOWN

## 2022-03-28 PROCEDURE — 85610 PROTHROMBIN TIME: CPT

## 2022-03-28 RX ORDER — TRAMADOL HYDROCHLORIDE 50 MG/1
50 TABLET ORAL
Qty: 28 TABLET | Refills: 0 | Status: SHIPPED | OUTPATIENT
Start: 2022-03-28 | End: 2022-04-04

## 2022-03-28 RX ADMIN — GABAPENTIN 100 MG: 100 CAPSULE ORAL at 09:17

## 2022-03-28 RX ADMIN — LISINOPRIL 20 MG: 20 TABLET ORAL at 09:19

## 2022-03-28 RX ADMIN — METOPROLOL SUCCINATE 50 MG: 50 TABLET, EXTENDED RELEASE ORAL at 09:19

## 2022-03-28 RX ADMIN — TRAMADOL HYDROCHLORIDE 50 MG: 50 TABLET, COATED ORAL at 21:46

## 2022-03-28 RX ADMIN — TRAMADOL HYDROCHLORIDE 50 MG: 50 TABLET, COATED ORAL at 15:06

## 2022-03-28 RX ADMIN — METHOCARBAMOL TABLETS 500 MG: 500 TABLET, COATED ORAL at 09:19

## 2022-03-28 RX ADMIN — METHOCARBAMOL TABLETS 500 MG: 500 TABLET, COATED ORAL at 17:09

## 2022-03-28 RX ADMIN — SODIUM CHLORIDE, PRESERVATIVE FREE 10 ML: 5 INJECTION INTRAVENOUS at 21:46

## 2022-03-28 RX ADMIN — LISINOPRIL 20 MG: 20 TABLET ORAL at 17:08

## 2022-03-28 RX ADMIN — SODIUM CHLORIDE, PRESERVATIVE FREE 10 ML: 5 INJECTION INTRAVENOUS at 00:20

## 2022-03-28 RX ADMIN — METHOCARBAMOL TABLETS 500 MG: 500 TABLET, COATED ORAL at 21:46

## 2022-03-28 RX ADMIN — SODIUM CHLORIDE, PRESERVATIVE FREE 10 ML: 5 INJECTION INTRAVENOUS at 05:59

## 2022-03-28 RX ADMIN — HYDROMORPHONE HYDROCHLORIDE 2 MG: 2 TABLET ORAL at 01:29

## 2022-03-28 RX ADMIN — TRAMADOL HYDROCHLORIDE 50 MG: 50 TABLET, COATED ORAL at 09:17

## 2022-03-28 RX ADMIN — SODIUM CHLORIDE, PRESERVATIVE FREE 5 ML: 5 INJECTION INTRAVENOUS at 14:35

## 2022-03-28 NOTE — PROGRESS NOTES
Resting quietly, awake, resp even, unlab, skin warm, dry. AP reg, lungs sounds clear. Lower back incisions x4 and sutures, clean, dry, intact with areas of bruising noted. Reports comfortable. Call light in reach, bed alarm set. No distress.

## 2022-03-28 NOTE — PROGRESS NOTES
ACUTE OT GOALS:  (Developed with and agreed upon by patient and/or caregiver.)  1. Patient will verbalize and demonstrate understanding of spinal precautions with 100% accuracy during ADLs. 2. Patient will complete lower body bathing and dressing with SUPERVISION and adaptive equipment as needed. 3. Patient will complete functional transfers with SUPERVISION and adaptive equipment as needed. 4. Patient will complete toileting and toilet transfer with SUPERVISION. 5. Patient will complete functional mobility of household distances with SUPERVISION and adaptive equipment as needed. 6. Patient will demonstrate ability to log roll in bed INDEPENDENTLY     Timeframe: 7 visits       OCCUPATIONAL THERAPY: Daily Note OT Treatment Day # 2    Thu Mcgee is a [de-identified] y.o. female   PRIMARY DIAGNOSIS: Vertebral fracture, osteoporotic (HCC)  Closed compression fracture of lumbosacral spine (HCC) [S32.000A]  Procedure(s) (LRB):  KYPHOPLASTY L3-L4 (N/A)  3 Days Post-Op  [place] / [place] / Product Type: Medicare /   ASSESSMENT:     REHAB RECOMMENDATIONS: CURRENT LEVEL OF FUNCTION:  (Most Recently Demonstrated)   Recommendation to date pending progress:  [address] [place] [address] Therapy  Equipment:    Rolling Walker Bathing:   Not tested  Dressing:   Supervision using reacher and sock aid for LB  Feeding/Grooming:   Set Up for brushing teeth/washing face/washing hair with shampoo cap  Toileting:   Not tested  Functional Mobility:   Minimal Assistance     ASSESSMENT:  Ms. Mere Jacome is doing fair today. Pt presents supine upon arrival. Pt required min A for bed mobility and transfers. Pt demonstrates fair sitting/standing balance at EOB. Pt introduced to and practiced using AE (reacher and sock aid) to assist with LB dressing. Pt performed grooming at sink with set up. Pt assisted with washing her hair with shampoo cap.  Pt fatigued from activities so she was assisted back to bed with same amount of assistance. Decent session for patient today. Making some progress with goals. Will continue to benefit from skilled OT during stay.      SUBJECTIVE:   Ms. Deangelo Patel states, \"My leg is hurting right here\"    SOCIAL HISTORY/LIVING ENVIRONMENT:   Home Environment: Private residence  One/Two Story Residence: One story  Living Alone: No  Support Systems: Child(vikram),Other Family Member(s),Other (Comment) (lives in [facility] (lives independently-not an long-term))    OBJECTIVE:     PAIN: VITAL SIGNS: LINES/DRAINS:   Pre Treatment: Pain Screen  Pain Scale 1: Numeric (0 - 10)  Pain Intensity 1: 0  Post Treatment: 0   N/A  O2 Device: None (Room air)     ACTIVITIES OF DAILY LIVING: I Mod I S SBA CGA Min Mod Max Total NT Comments   BASIC ADLs:              Bathing/ Showering [] [] [] [] [] [] [] [] [] [x]    Toileting [] [] [] [] [] [] [] [] [] [x]    Dressing [] [] [x] [] [] [] [] [] [] []    Feeding [] [] [] [] [] [] [] [] [] [x]    Grooming [] [] [x] [] [] [] [] [] [] []    Personal Device Care [] [] [] [] [] [] [] [] [] [x]    Functional Mobility [] [] [] [] [x] [x] [] [] [] []    I=Independent, Mod I=Modified Independent, S=Supervision, SBA=Standby Assistance, CGA=Contact Guard Assistance,   Min=Minimal Assistance, Mod=Moderate Assistance, Max=Maximal Assistance, Total=Total Assistance, NT=Not Tested    MOBILITY: I Mod I S SBA CGA Min Mod Max Total  NT x2 Comments:   Supine to sit [] [] [] [] [] [x] [] [] [] [] []    Sit to supine [] [] [] [] [x] [x] [] [] [] [] []    Sit to stand [] [] [] [] [] [x] [] [] [] [] []    Bed to chair [] [] [] [] [x] [x] [] [] [] [] []    I=Independent, Mod I=Modified Independent, S=Supervision, SBA=Standby Assistance, CGA=Contact Guard Assistance,   Min=Minimal Assistance, Mod=Moderate Assistance, Max=Maximal Assistance, Total=Total Assistance, NT=Not Tested    BALANCE: Good Fair+ Fair Fair- Poor NT Comments   Sitting Static [] [x] [] [] [] []    Sitting Dynamic [] [x] [] [] [] []              Standing Static [] [] [x] [] [] []    Standing Dynamic [] [] [x] [] [] []      PLAN:   FREQUENCY/DURATION: OT Plan of Care: 3 times/week for duration of hospital stay or until stated goals are met, whichever comes first.    TREATMENT:   TREATMENT:   ($$ Self Care/Home Management: 8-22 mins$$ Therapeutic Activity: 23-37 mins   )  Therapeutic Activity (25 Minutes): Therapeutic activity included Supine to Sit, Sit to Supine, Scooting, Transfer Training, Ambulation on level ground, Sitting balance  and Standing balance to improve functional Mobility, Strength and Activity tolerance. Self Care (15 Minutes): Self care including Lower Body Dressing, Grooming and ADL Adaptive Equipment Training to increase independence and decrease level of assistance required.     TREATMENT GRID:  N/A    AFTER TREATMENT POSITION/PRECAUTIONS:  Bed, Needs within reach, RN notified and Visitors at bedside    INTERDISCIPLINARY COLLABORATION:  RN/PCT and OT/NASSAR    TOTAL TREATMENT DURATION:  OT Patient Time In/Time Out  Time In: 1030  Time Out: [address], MANUEL

## 2022-03-28 NOTE — PROGRESS NOTES
Chart review complete. Pt's DCP remains home with [facility] services and her dtr staying with her. SW remains available to assist as needed.

## 2022-03-28 NOTE — PROGRESS NOTES
ACUTE PHYSICAL THERAPY GOALS:  (Developed with and agreed upon by patient and/or caregiver.)      (1.) Thony Olea  will move from supine to sit and sit to supine  with INDEPENDENCE within 7 treatment day(s). (2.) Thoyn Olea will transfer from bed to chair and chair to bed with MODIFIED INDEPENDENCE using the least restrictive device within 7 treatment day(s). (3.) Thony Olea will ambulate with MODIFIED INDEPENDENCE for 150 feet with the least restrictive device within 7 treatment day(s). (4.) Thony Olea will perform standing static and dynamic balance activities x 10 minutes with SUPERVISION to improve safety within 7 treatment day(s). (5.) Thony Olea will perform bilateral lower extremity exercises x 20 min for HEP with INDEPENDENCE to improve strength, endurance, and functional mobility within 7 treatment day(s). PHYSICAL THERAPY: Daily Note and AM Treatment Day # 4    Thony Olea is a [de-identified] y.o. female   PRIMARY DIAGNOSIS: Vertebral fracture, osteoporotic (HCC)  Closed compression fracture of lumbosacral spine (HCC) [S32.000A]  Procedure(s) (LRB):  KYPHOPLASTY L3-L4 (N/A)  3 Days Post-Op    ASSESSMENT:     REHAB RECOMMENDATIONS: CURRENT LEVEL OF FUNCTION:  (Most Recently Demonstrated)   Recommendation to date pending progress:  [address] [place] [address] Therapy  Equipment:    Rolling Walker Bed Mobility:   Minimal Assistance  Sit to Stand:   Contact Guard Assistance  Transfers:   Contact Guard Assistance  Gait/Mobility:   Contact Guard Assistance      ASSESSMENT:  Ms. Deangelo Patel is making good progress toward her goals but seemed to be in more pain. Returned to room and to bed to perform ex. SUBJECTIVE:   Ms. Deangelo Patel states \"My hip os hurting\" pointing to R hip.     SOCIAL HISTORY/ LIVING ENVIRONMENT: PLOF: typically independent, started using rollator in recent   weeks, lives alone in halfway community with daughter nearby and able to assist, elevator to enter house,   2 recent falls  Home Environment: Private residence  [address] [place] [address] St: One story  Living Alone: No  Support Systems: Child(vikram),Other Family Member(s),Other (Comment) (lives in [facility] (lives independently-not an AMBER))  OBJECTIVE:     PAIN: VITAL SIGNS: LINES/DRAINS:   Pre Treatment:    Post Treatment:    IV  O2 Device: None (Room air)     MOBILITY: I Mod I S SBA CGA Min Mod Max Total  NT x2 Comments:   Bed Mobility    Rolling [] [] [] [] [] [x] [] [] [] [] []    Supine to Sit [] [] [] [] [] [x] [] [] [] [] []    Scooting [] [] [] [] [x] [] [] [] [] [] []    Sit to Supine [] [] [] [] [] [] [] [] [] [x] []    Transfers    Sit to Stand [] [] [] [] [x] [] [] [] [] [] []    Bed to Chair [] [] [] [] [x] [] [] [] [] [] []    Stand to Sit [] [] [] [] [x] [] [] [] [] [] []    I=Independent, Mod I=Modified Independent, S=Supervision, SBA=Standby Assistance, CGA=Contact Guard Assistance,   Min=Minimal Assistance, Mod=Moderate Assistance, Max=Maximal Assistance, Total=Total Assistance, NT=Not Tested    BALANCE: Good Fair+ Fair Fair- Poor NT Comments   Sitting Static [x] [] [] [] [] []    Sitting Dynamic [x] [] [] [] [] []              Standing Static [] [x] [] [] [] []    Standing Dynamic [] [x] [x] [] [] []      GAIT: I Mod I S SBA CGA Min Mod Max Total  NT x2 Comments:   Level of Assistance [] [] [] [] [x] [x] [] [] [] [] []    Distance 125'    DME Rolling Walker    Gait Quality slow    Weightbearing  Status N/A     I=Independent, Mod I=Modified Independent, S=Supervision, SBA=Standby Assistance, CGA=Contact Guard Assistance,   Min=Minimal Assistance, Mod=Moderate Assistance, Max=Maximal Assistance, Total=Total Assistance, NT=Not Tested    PLAN:   FREQUENCY/DURATION: PT Plan of Care: Daily for duration of hospital stay or until stated goals are met, whichever comes first.  TREATMENT:     TREATMENT:   ($$ Therapeutic Activity: 23-37 mins    )  Therapeutic Activity (24 Minutes): Therapeutic activity included Rolling, Supine to Sit, Sit to Supine, Scooting, Transfer Training, Ambulation on level ground, Sitting balance  and Standing balance to improve functional Mobility, Strength and Activity tolerance.     TREATMENT GRID:  N/A    AFTER TREATMENT POSITION/PRECAUTIONS:  Bed, Chair, Needs within reach, RN notified and Visitors at bedside    INTERDISCIPLINARY COLLABORATION:  RN/PCT and PT/PTA    TOTAL TREATMENT DURATION:  PT Patient Time In/Time Out  Time In: 1432  Time Out: [address] PTA

## 2022-03-28 NOTE — PROGRESS NOTES
POD 3    AF,VSS  Patient feeling much better and progressing well with PT  Neuro intact  Plan to discharge to home tomorrow with [facility].

## 2022-03-28 NOTE — PROGRESS NOTES
3/28/2022 2:46 PM    Admit Date: 3/21/2022    Admit Diagnosis: Closed compression fracture of lumbosacral spine (HCC) [S32.000A]      Subjective:   No cp or sob      Objective:      Visit Vitals  BP (!) 156/77 (BP 1 Location: Right upper arm, BP Patient Position: Supine)   Pulse (!) 59   Temp 97.4 °F (36.3 °C)   Resp 18   Ht 5' 4\" (1.626 m)   Wt 150 lb (68 kg)   SpO2 96%   BMI 25.75 kg/m²       Physical Exam:  Christine Trang, Well Nourished, No Acute Distress, Alert & Oriented x 3, appropriate mood. Neck- supple, no JVD  CV- regular rate and rhythm no MRG  Lung- clear bilaterally  Abd- soft, nontender, nondistended  Ext- no edema bilaterally. Skin- warm and dry        Data Review:   Recent Labs     03/28/22  0435   INR 1.1       Assessment/Plan:     Principal Problem:    Vertebral fracture, osteoporotic ([address] (3/21/2022)    Active Problems:    Suspected sleep apnea (2/11/2021)      Longstanding persistent atrial fibrillation ([address] (3/8/2021)      CAD (coronary artery disease) (3/22/2022)Stable. Continue current medical therapy. HTN (hypertension) (3/22/2022)Improved with current therapy.  Will continue medications

## 2022-03-28 NOTE — PROGRESS NOTES
Awake with no c/o. Call light in reach, bed alarm set, door open. No distress noted. Report given to Raudel Rodas RN.

## 2022-03-29 VITALS
WEIGHT: 150 LBS | HEIGHT: 64 IN | BODY MASS INDEX: 25.61 KG/M2 | TEMPERATURE: 97.9 F | HEART RATE: 104 BPM | DIASTOLIC BLOOD PRESSURE: 75 MMHG | OXYGEN SATURATION: 96 % | RESPIRATION RATE: 18 BRPM | SYSTOLIC BLOOD PRESSURE: 146 MMHG

## 2022-03-29 LAB
INR PPP: 1.1
PROTHROMBIN TIME: 14.1 SEC (ref 12.6–14.5)

## 2022-03-29 PROCEDURE — 97530 THERAPEUTIC ACTIVITIES: CPT

## 2022-03-29 PROCEDURE — 74011250637 HC RX REV CODE- 250/637: Performed by: ORTHOPAEDIC SURGERY

## 2022-03-29 PROCEDURE — 36415 COLL VENOUS BLD VENIPUNCTURE: CPT

## 2022-03-29 PROCEDURE — 74011000250 HC RX REV CODE- 250: Performed by: ORTHOPAEDIC SURGERY

## 2022-03-29 PROCEDURE — 85610 PROTHROMBIN TIME: CPT

## 2022-03-29 PROCEDURE — 99232 SBSQ HOSP IP/OBS MODERATE 35: CPT | Performed by: INTERNAL MEDICINE

## 2022-03-29 PROCEDURE — 74011250637 HC RX REV CODE- 250/637: Performed by: INTERNAL MEDICINE

## 2022-03-29 RX ADMIN — TRAMADOL HYDROCHLORIDE 50 MG: 50 TABLET, COATED ORAL at 14:09

## 2022-03-29 RX ADMIN — TRAMADOL HYDROCHLORIDE 50 MG: 50 TABLET, COATED ORAL at 08:01

## 2022-03-29 RX ADMIN — LISINOPRIL 20 MG: 20 TABLET ORAL at 08:01

## 2022-03-29 RX ADMIN — METOPROLOL SUCCINATE 50 MG: 50 TABLET, EXTENDED RELEASE ORAL at 08:00

## 2022-03-29 RX ADMIN — GABAPENTIN 100 MG: 100 CAPSULE ORAL at 08:00

## 2022-03-29 RX ADMIN — METHOCARBAMOL TABLETS 500 MG: 500 TABLET, COATED ORAL at 08:00

## 2022-03-29 RX ADMIN — SODIUM CHLORIDE, PRESERVATIVE FREE 10 ML: 5 INJECTION INTRAVENOUS at 05:42

## 2022-03-29 NOTE — PROGRESS NOTES
Several of patient's medications have been increased while during her hospital stay. Per cardiology, Dr. Yolanda Brink, she is to continue her lisinopril and metoprolol as she has been taking them in the hospital. Lisinopril 20 mg twice daily, and metoprolol 50 mg once daily. She is to hold off on taking her coumadin until her follow-up appointment with cardiology on April 11th, also per cardiology. Pt and daughter educated on medication changes and verbalized understanding.

## 2022-03-29 NOTE — PROGRESS NOTES
ACUTE PHYSICAL THERAPY GOALS:  (Developed with and agreed upon by patient and/or caregiver.)      (1.) Slade Dwyer  will move from supine to sit and sit to supine  with INDEPENDENCE within 7 treatment day(s). (2.) Slade Dwyer will transfer from bed to chair and chair to bed with MODIFIED INDEPENDENCE using the least restrictive device within 7 treatment day(s). (3.) Slade Dwyer will ambulate with MODIFIED INDEPENDENCE for 150 feet with the least restrictive device within 7 treatment day(s). (4.) Slade Dwyer will perform standing static and dynamic balance activities x 10 minutes with SUPERVISION to improve safety within 7 treatment day(s). (5.) Slade Dwyer will perform bilateral lower extremity exercises x 20 min for HEP with INDEPENDENCE to improve strength, endurance, and functional mobility within 7 treatment day(s). PHYSICAL THERAPY: Daily Note and AM Treatment Day # 5    Slade Dwyer is a [de-identified] y.o. female   PRIMARY DIAGNOSIS: Vertebral fracture, osteoporotic (HCC)  Closed compression fracture of lumbosacral spine (HCC) [S32.000A]  Procedure(s) (LRB):  KYPHOPLASTY L3-L4 (N/A)  4 Days Post-Op    ASSESSMENT:     REHAB RECOMMENDATIONS: CURRENT LEVEL OF FUNCTION:  (Most Recently Demonstrated)   Recommendation to date pending progress:  [address] [place] [address] Therapy  Equipment:    Rolling Walker Bed Mobility:   Minimal Assistance  Sit to Stand:   Contact Guard Assistance  Transfers:   Contact Guard Assistance  Gait/Mobility:   Contact Guard Assistance      ASSESSMENT:  Ms. Maia Avina is supine in bed and agreeable to therapy. Bed mobility is with min assist.  Sitting balance edge of bed is better with time  Sit to stand with min assist to the walker. Gait training with rolling walker x 250 feet with slow harsha and one sitting rest break. Patient is returned to the room and to then to the [facility] then to the recliner  Patient was able to clean herself. Making good progress toward her goals. Patient is left in the recliner with needs within reach and alarm intact. Continue PT efforts. Home with HHPT. Patient states that her daughter will help care for her. SUBJECTIVE:   Ms. Mathieu Herrmann states \"Does it get better? \".     SOCIAL HISTORY/ LIVING ENVIRONMENT: PLOF: typically independent, started using rollator in recent   weeks, lives alone in residential community with daughter nearby and able to assist, elevator to enter house,   2 recent falls  Home Environment: Private residence  [address] [place] [address] St: One story  Living Alone: No  Support Systems: Child(vikram),Other Family Member(s),Other (Comment) (lives in [facility] (lives independently-not an USP))  OBJECTIVE:     PAIN: VITAL SIGNS: LINES/DRAINS:   Pre Treatment: Pain Screen  Pain Scale 1: Numeric (0 - 10)  Pain Intensity 1:  (no number given)  Post Treatment:  No number given   None  O2 Device: None (Room air)     MOBILITY: I Mod I S SBA CGA Min Mod Max Total  NT x2 Comments:   Bed Mobility    Rolling [] [] [] [] [] [x] [] [] [] [] []    Supine to Sit [] [] [] [] [] [x] [] [] [] [] []    Scooting [] [] [] [] [] [x] [] [] [] [] []    Sit to Supine [] [] [] [] [] [] [] [] [] [x] []    Transfers    Sit to Stand [] [] [] [] [x] [] [] [] [] [] []    Bed to Chair [] [] [] [] [x] [] [] [] [] [] []    Stand to Sit [] [] [] [] [x] [] [] [] [] [] []    I=Independent, Mod I=Modified Independent, S=Supervision, SBA=Standby Assistance, CGA=Contact Guard Assistance,   Min=Minimal Assistance, Mod=Moderate Assistance, Max=Maximal Assistance, Total=Total Assistance, NT=Not Tested    BALANCE: Good Fair+ Fair Fair- Poor NT Comments   Sitting Static [x] [] [] [] [] []    Sitting Dynamic [x] [] [] [] [] []              Standing Static [] [x] [] [] [] []    Standing Dynamic [] [x] [] [] [] []      GAIT: I Mod I S SBA CGA Min Mod Max Total  NT x2 Comments:   Level of Assistance [] [] [] [] [x] [] [] [] [] [] []    Distance 250 feet     DME Rolling Walker    Gait Quality slow    Weightbearing  Status N/A     I=Independent, Mod I=Modified Independent, S=Supervision, SBA=Standby Assistance, CGA=Contact Guard Assistance,   Min=Minimal Assistance, Mod=Moderate Assistance, Max=Maximal Assistance, Total=Total Assistance, NT=Not Tested    PLAN:   FREQUENCY/DURATION: PT Plan of Care: Daily for duration of hospital stay or until stated goals are met, whichever comes first.  TREATMENT:     TREATMENT:   ($$ Therapeutic Activity: 23-37 mins    )  Therapeutic Activity (31 Minutes): Therapeutic activity included Rolling, Supine to Sit, Sit to Supine, Scooting, Transfer Training, Ambulation on level ground, Sitting balance  and Standing balance to improve functional Mobility, Strength and Activity tolerance.     TREATMENT GRID:  N/A    AFTER TREATMENT POSITION/PRECAUTIONS:  Alarm Activated, Chair, Needs within reach and RN notified    INTERDISCIPLINARY COLLABORATION:  RN/PCT and PT/PTA    TOTAL TREATMENT DURATION:  PT Patient Time In/Time Out  Time In: 0953  Time Out: 1024    Deneen Fish-Kristi, PTA

## 2022-03-29 NOTE — PROGRESS NOTES
Pt is medically cleared for dc to home today with [facility] therapy services through [facility] [place]. No other dc needs or concerns identified or reported. SW remains available to assist as needed. Care Management Interventions  PCP Verified by CM: Yes  Mode of Transport at Discharge:  Other (see comment) (daughter)  Transition of Care Consult (CM Consult): [address]: Yes  Discharge Durable Medical Equipment: No  Physical Therapy Consult: Yes  Occupational Therapy Consult: Yes  Speech Therapy Consult: No  Support Systems: Child(vikram),Other Family Member(s),Other (Comment) (lives in [employer] (lives independently-not an AMBER))  Confirm Follow Up Transport: Family  The Plan for Transition of Care is Related to the Following Treatment Goals : Home health therapy services to improve pt's strength and functional abilities s/p kyphoplasty  The Patient and/or Patient Representative was Provided with a Choice of Provider and Agrees with the Discharge Plan?: Yes  Name of the Patient Representative Who was Provided with a Choice of Provider and Agrees with the Discharge Plan: Miriam/dtr  Freedom of Choice List was Provided with Basic Dialogue that Supports the Patient's Individualized Plan of Care/Goals, Treatment Preferences and Shares the Quality Data Associated with the Providers?: Yes  Discharge Location  Patient Expects to be Discharged to[de-identified] Home with [facility])

## 2022-03-29 NOTE — PROGRESS NOTES
Patient requesting rolling walker as recommended by therapy. Patient only has a rollator at home.   CM placed order, provided to patient and faxed signed consent/acknowledgement to [facility] - JCARLOS NINO

## 2022-03-29 NOTE — PROGRESS NOTES
3/29/2022 2:12 PM    Admit Date: 3/21/2022    Admit Diagnosis: Closed compression fracture of lumbosacral spine (HCC) [S32.000A]      Subjective:   No co or sob      Objective:      Visit Vitals  BP (!) 146/75 (BP 1 Location: Right upper arm, BP Patient Position: At rest)   Pulse (!) 104   Temp 97.9 °F (36.6 °C)   Resp 18   Ht 5' 4\" (1.626 m)   Wt 150 lb (68 kg)   SpO2 96%   BMI 25.75 kg/m²       Physical Exam:  Donny Bellow, Well Nourished, No Acute Distress, Alert & Oriented x 3, appropriate mood. Neck- supple, no JVD  CV- regular rate and rhythm no MRG  Lung- clear bilaterally  Abd- soft, nontender, nondistended  Ext- no edema bilaterally. Skin- warm and dry        Data Review:   Recent Labs     03/29/22  0453   INR 1.1       Assessment/Plan:     Principal Problem:    Vertebral fracture, osteoporotic ([address] (3/21/2022)    Active Problems:    Suspected sleep apnea (2/11/2021)      Longstanding persistent atrial fibrillation ([address] (3/8/2021)      CAD (coronary artery disease) (3/22/2022)Stable. Continue current medical therapy.         HTN (hypertension) (3/22/2022)home on higher dose anti-htn meds

## 2022-03-30 ENCOUNTER — HOME CARE VISIT (OUTPATIENT)
Dept: SCHEDULING | Facility: HOME HEALTH | Age: 82
End: 2022-03-30
Payer: MEDICARE

## 2022-03-30 VITALS
OXYGEN SATURATION: 98 % | SYSTOLIC BLOOD PRESSURE: 154 MMHG | RESPIRATION RATE: 18 BRPM | DIASTOLIC BLOOD PRESSURE: 82 MMHG | HEART RATE: 78 BPM | TEMPERATURE: 97.8 F

## 2022-03-30 PROCEDURE — 3331090002 HH PPS REVENUE DEBIT

## 2022-03-30 PROCEDURE — 400013 HH SOC

## 2022-03-30 PROCEDURE — G0151 HHCP-SERV OF PT,EA 15 MIN: HCPCS

## 2022-03-30 PROCEDURE — 3331090001 HH PPS REVENUE CREDIT

## 2022-03-30 PROCEDURE — 400018 HH-NO PAY CLAIM PROCEDURE

## 2022-03-31 ENCOUNTER — HOME CARE VISIT (OUTPATIENT)
Dept: SCHEDULING | Facility: HOME HEALTH | Age: 82
End: 2022-03-31
Payer: MEDICARE

## 2022-03-31 PROCEDURE — 3331090002 HH PPS REVENUE DEBIT

## 2022-03-31 PROCEDURE — 3331090001 HH PPS REVENUE CREDIT

## 2022-03-31 PROCEDURE — G0156 HHCP-SVS OF AIDE,EA 15 MIN: HCPCS

## 2022-04-01 ENCOUNTER — HOME CARE VISIT (OUTPATIENT)
Dept: SCHEDULING | Facility: HOME HEALTH | Age: 82
End: 2022-04-01
Payer: MEDICARE

## 2022-04-01 ENCOUNTER — HOME CARE VISIT (OUTPATIENT)
Dept: HOME HEALTH SERVICES | Facility: HOME HEALTH | Age: 82
End: 2022-04-01
Payer: MEDICARE

## 2022-04-01 VITALS
TEMPERATURE: 97.8 F | SYSTOLIC BLOOD PRESSURE: 130 MMHG | HEART RATE: 78 BPM | RESPIRATION RATE: 18 BRPM | DIASTOLIC BLOOD PRESSURE: 72 MMHG

## 2022-04-01 VITALS
RESPIRATION RATE: 17 BRPM | HEART RATE: 60 BPM | TEMPERATURE: 97.9 F | DIASTOLIC BLOOD PRESSURE: 68 MMHG | SYSTOLIC BLOOD PRESSURE: 128 MMHG | OXYGEN SATURATION: 97 %

## 2022-04-01 PROCEDURE — G0157 HHC PT ASSISTANT EA 15: HCPCS

## 2022-04-01 PROCEDURE — 3331090001 HH PPS REVENUE CREDIT

## 2022-04-01 PROCEDURE — 3331090002 HH PPS REVENUE DEBIT

## 2022-04-02 PROCEDURE — 3331090002 HH PPS REVENUE DEBIT

## 2022-04-02 PROCEDURE — 3331090001 HH PPS REVENUE CREDIT

## 2022-04-03 PROCEDURE — 3331090002 HH PPS REVENUE DEBIT

## 2022-04-03 PROCEDURE — 3331090001 HH PPS REVENUE CREDIT

## 2022-04-04 ENCOUNTER — HOME CARE VISIT (OUTPATIENT)
Dept: SCHEDULING | Facility: HOME HEALTH | Age: 82
End: 2022-04-04
Payer: MEDICARE

## 2022-04-04 VITALS
DIASTOLIC BLOOD PRESSURE: 72 MMHG | RESPIRATION RATE: 18 BRPM | TEMPERATURE: 97.8 F | HEART RATE: 79 BPM | SYSTOLIC BLOOD PRESSURE: 132 MMHG

## 2022-04-04 PROCEDURE — G0156 HHCP-SVS OF AIDE,EA 15 MIN: HCPCS

## 2022-04-04 PROCEDURE — 3331090002 HH PPS REVENUE DEBIT

## 2022-04-04 PROCEDURE — 3331090001 HH PPS REVENUE CREDIT

## 2022-04-05 ENCOUNTER — HOME CARE VISIT (OUTPATIENT)
Dept: SCHEDULING | Facility: HOME HEALTH | Age: 82
End: 2022-04-05
Payer: MEDICARE

## 2022-04-05 VITALS
SYSTOLIC BLOOD PRESSURE: 138 MMHG | DIASTOLIC BLOOD PRESSURE: 84 MMHG | RESPIRATION RATE: 18 BRPM | HEART RATE: 60 BPM | OXYGEN SATURATION: 98 % | TEMPERATURE: 98.3 F

## 2022-04-05 PROCEDURE — G0157 HHC PT ASSISTANT EA 15: HCPCS

## 2022-04-05 PROCEDURE — 3331090001 HH PPS REVENUE CREDIT

## 2022-04-05 PROCEDURE — 3331090002 HH PPS REVENUE DEBIT

## 2022-04-06 ENCOUNTER — HOME CARE VISIT (OUTPATIENT)
Dept: SCHEDULING | Facility: HOME HEALTH | Age: 82
End: 2022-04-06
Payer: MEDICARE

## 2022-04-06 VITALS
DIASTOLIC BLOOD PRESSURE: 72 MMHG | SYSTOLIC BLOOD PRESSURE: 118 MMHG | HEART RATE: 64 BPM | TEMPERATURE: 97.8 F | OXYGEN SATURATION: 93 %

## 2022-04-06 PROCEDURE — 3331090002 HH PPS REVENUE DEBIT

## 2022-04-06 PROCEDURE — G0152 HHCP-SERV OF OT,EA 15 MIN: HCPCS

## 2022-04-06 PROCEDURE — 3331090001 HH PPS REVENUE CREDIT

## 2022-04-07 PROCEDURE — 3331090002 HH PPS REVENUE DEBIT

## 2022-04-07 PROCEDURE — 3331090001 HH PPS REVENUE CREDIT

## 2022-04-07 NOTE — DISCHARGE SUMMARY
[facility]  DISCHARGE SUMMARY    Name:  Jennifer Bullock  MR#:  [id number]  :  1940  ACCOUNT #:  [de-identified]  ADMIT DATE:  2022  DISCHARGE DATE:  2022    PRIMARY DIAGNOSIS:  Osteoporotic L3 and L4 compression fractures. PROCEDURE:  L3 and L4 kyphoplasty. HISTORY OF PRESENT ILLNESS:  The patient is an 81-year-old frail female who had sustained an L3 and L4 compression fracture that caused intractable pain and she was unable to do any activity or mobilize by herself and she started deteriorating physically. She was seen in the office and plans were made to do a kyphoplasty to alleviate the pain and get her mobile again. HOSPITAL COURSE:  She was brought to the hospital on 2022 and she underwent an L3 and an L4 kyphoplasty which was uneventful, and because of her medical history of cardiac issues and her age, we kept her overnight and the following day, we got her mobilizing with physical therapy but she has already had significant deconditioning prior to this so it took several days before she progressed well enough with therapy that she could get in and out of bed by herself and walk up and down the halls. She remained afebrile with stable vital signs and her cardiac situation remained stable throughout the hospital course. The patient reported very good relief of her preoperative pain and by postoperative day #4, 2022, she was ready for discharge so we are going to discharge her to home and Therapy will come out to help her ambulate and we are only going to give her tramadol for pain and I will see her back in the office in 1-2 weeks to evaluate.       MD BAILEY Duran/[email or web address]  D:  2022 8:43  T:  2022 16:58  JOB #:  [id number]

## 2022-04-08 ENCOUNTER — HOME CARE VISIT (OUTPATIENT)
Dept: SCHEDULING | Facility: HOME HEALTH | Age: 82
End: 2022-04-08
Payer: MEDICARE

## 2022-04-08 VITALS
DIASTOLIC BLOOD PRESSURE: 76 MMHG | HEART RATE: 60 BPM | TEMPERATURE: 98 F | OXYGEN SATURATION: 96 % | SYSTOLIC BLOOD PRESSURE: 128 MMHG | RESPIRATION RATE: 17 BRPM

## 2022-04-08 VITALS
TEMPERATURE: 98.1 F | OXYGEN SATURATION: 97 % | HEART RATE: 62 BPM | RESPIRATION RATE: 18 BRPM | DIASTOLIC BLOOD PRESSURE: 72 MMHG | SYSTOLIC BLOOD PRESSURE: 128 MMHG

## 2022-04-08 PROCEDURE — 3331090001 HH PPS REVENUE CREDIT

## 2022-04-08 PROCEDURE — G0299 HHS/HOSPICE OF RN EA 15 MIN: HCPCS

## 2022-04-08 PROCEDURE — G0157 HHC PT ASSISTANT EA 15: HCPCS

## 2022-04-08 PROCEDURE — 3331090002 HH PPS REVENUE DEBIT

## 2022-04-09 PROCEDURE — 3331090001 HH PPS REVENUE CREDIT

## 2022-04-09 PROCEDURE — 3331090002 HH PPS REVENUE DEBIT

## 2022-04-10 PROCEDURE — 3331090002 HH PPS REVENUE DEBIT

## 2022-04-10 PROCEDURE — 3331090001 HH PPS REVENUE CREDIT

## 2022-04-11 PROCEDURE — 3331090001 HH PPS REVENUE CREDIT

## 2022-04-11 PROCEDURE — 3331090002 HH PPS REVENUE DEBIT

## 2022-04-12 ENCOUNTER — HOME CARE VISIT (OUTPATIENT)
Dept: SCHEDULING | Facility: HOME HEALTH | Age: 82
End: 2022-04-12
Payer: MEDICARE

## 2022-04-12 VITALS
RESPIRATION RATE: 15 BRPM | DIASTOLIC BLOOD PRESSURE: 72 MMHG | OXYGEN SATURATION: 99 % | HEART RATE: 59 BPM | TEMPERATURE: 97.8 F | SYSTOLIC BLOOD PRESSURE: 132 MMHG

## 2022-04-12 VITALS
OXYGEN SATURATION: 98 % | RESPIRATION RATE: 18 BRPM | DIASTOLIC BLOOD PRESSURE: 82 MMHG | SYSTOLIC BLOOD PRESSURE: 132 MMHG | TEMPERATURE: 97.8 F | HEART RATE: 78 BPM

## 2022-04-12 PROCEDURE — G0151 HHCP-SERV OF PT,EA 15 MIN: HCPCS

## 2022-04-12 PROCEDURE — 3331090001 HH PPS REVENUE CREDIT

## 2022-04-12 PROCEDURE — G0299 HHS/HOSPICE OF RN EA 15 MIN: HCPCS

## 2022-04-12 PROCEDURE — 3331090002 HH PPS REVENUE DEBIT

## 2022-04-13 ENCOUNTER — HOME CARE VISIT (OUTPATIENT)
Dept: SCHEDULING | Facility: HOME HEALTH | Age: 82
End: 2022-04-13
Payer: MEDICARE

## 2022-04-13 VITALS
HEART RATE: 60 BPM | SYSTOLIC BLOOD PRESSURE: 132 MMHG | OXYGEN SATURATION: 96 % | DIASTOLIC BLOOD PRESSURE: 58 MMHG | TEMPERATURE: 98.1 F | RESPIRATION RATE: 15 BRPM

## 2022-04-13 PROCEDURE — 3331090001 HH PPS REVENUE CREDIT

## 2022-04-13 PROCEDURE — 3331090002 HH PPS REVENUE DEBIT

## 2022-04-13 PROCEDURE — G0158 HHC OT ASSISTANT EA 15: HCPCS

## 2022-04-14 PROCEDURE — 3331090001 HH PPS REVENUE CREDIT

## 2022-04-14 PROCEDURE — 3331090002 HH PPS REVENUE DEBIT

## 2022-04-15 ENCOUNTER — HOME CARE VISIT (OUTPATIENT)
Dept: SCHEDULING | Facility: HOME HEALTH | Age: 82
End: 2022-04-15
Payer: MEDICARE

## 2022-04-15 VITALS
RESPIRATION RATE: 15 BRPM | OXYGEN SATURATION: 95 % | DIASTOLIC BLOOD PRESSURE: 80 MMHG | HEART RATE: 61 BPM | TEMPERATURE: 97.4 F | SYSTOLIC BLOOD PRESSURE: 135 MMHG

## 2022-04-15 VITALS
OXYGEN SATURATION: 98 % | TEMPERATURE: 98.4 F | SYSTOLIC BLOOD PRESSURE: 142 MMHG | DIASTOLIC BLOOD PRESSURE: 76 MMHG | RESPIRATION RATE: 17 BRPM

## 2022-04-15 PROCEDURE — 3331090001 HH PPS REVENUE CREDIT

## 2022-04-15 PROCEDURE — 3331090002 HH PPS REVENUE DEBIT

## 2022-04-15 PROCEDURE — G0158 HHC OT ASSISTANT EA 15: HCPCS

## 2022-04-15 PROCEDURE — G0157 HHC PT ASSISTANT EA 15: HCPCS

## 2022-04-16 PROCEDURE — 3331090001 HH PPS REVENUE CREDIT

## 2022-04-16 PROCEDURE — 3331090002 HH PPS REVENUE DEBIT

## 2022-04-17 PROCEDURE — 3331090002 HH PPS REVENUE DEBIT

## 2022-04-17 PROCEDURE — 3331090001 HH PPS REVENUE CREDIT

## 2022-04-18 ENCOUNTER — HOME CARE VISIT (OUTPATIENT)
Dept: SCHEDULING | Facility: HOME HEALTH | Age: 82
End: 2022-04-18
Payer: MEDICARE

## 2022-04-18 VITALS
HEART RATE: 64 BPM | DIASTOLIC BLOOD PRESSURE: 78 MMHG | OXYGEN SATURATION: 96 % | SYSTOLIC BLOOD PRESSURE: 122 MMHG | TEMPERATURE: 97.3 F | RESPIRATION RATE: 15 BRPM

## 2022-04-18 PROCEDURE — G0158 HHC OT ASSISTANT EA 15: HCPCS

## 2022-04-18 PROCEDURE — 3331090002 HH PPS REVENUE DEBIT

## 2022-04-18 PROCEDURE — 3331090001 HH PPS REVENUE CREDIT

## 2022-04-19 ENCOUNTER — HOME CARE VISIT (OUTPATIENT)
Dept: SCHEDULING | Facility: HOME HEALTH | Age: 82
End: 2022-04-19
Payer: MEDICARE

## 2022-04-19 VITALS
TEMPERATURE: 98 F | DIASTOLIC BLOOD PRESSURE: 60 MMHG | OXYGEN SATURATION: 96 % | SYSTOLIC BLOOD PRESSURE: 120 MMHG | HEART RATE: 60 BPM | RESPIRATION RATE: 14 BRPM

## 2022-04-19 PROCEDURE — G0299 HHS/HOSPICE OF RN EA 15 MIN: HCPCS

## 2022-04-19 PROCEDURE — 3331090001 HH PPS REVENUE CREDIT

## 2022-04-19 PROCEDURE — 3331090002 HH PPS REVENUE DEBIT

## 2022-04-20 ENCOUNTER — HOME CARE VISIT (OUTPATIENT)
Dept: HOME HEALTH SERVICES | Facility: HOME HEALTH | Age: 82
End: 2022-04-20
Payer: MEDICARE

## 2022-04-20 ENCOUNTER — HOME CARE VISIT (OUTPATIENT)
Dept: SCHEDULING | Facility: HOME HEALTH | Age: 82
End: 2022-04-20
Payer: MEDICARE

## 2022-04-20 VITALS
TEMPERATURE: 98.6 F | OXYGEN SATURATION: 99 % | HEART RATE: 60 BPM | SYSTOLIC BLOOD PRESSURE: 124 MMHG | DIASTOLIC BLOOD PRESSURE: 72 MMHG | RESPIRATION RATE: 17 BRPM

## 2022-04-20 PROCEDURE — 3331090002 HH PPS REVENUE DEBIT

## 2022-04-20 PROCEDURE — 3331090001 HH PPS REVENUE CREDIT

## 2022-04-20 PROCEDURE — G0157 HHC PT ASSISTANT EA 15: HCPCS

## 2022-04-21 ENCOUNTER — HOME CARE VISIT (OUTPATIENT)
Dept: SCHEDULING | Facility: HOME HEALTH | Age: 82
End: 2022-04-21
Payer: MEDICARE

## 2022-04-21 VITALS
HEART RATE: 54 BPM | TEMPERATURE: 98.5 F | RESPIRATION RATE: 15 BRPM | OXYGEN SATURATION: 98 % | DIASTOLIC BLOOD PRESSURE: 58 MMHG | SYSTOLIC BLOOD PRESSURE: 119 MMHG

## 2022-04-21 PROCEDURE — G0158 HHC OT ASSISTANT EA 15: HCPCS

## 2022-04-21 PROCEDURE — 3331090002 HH PPS REVENUE DEBIT

## 2022-04-21 PROCEDURE — 3331090001 HH PPS REVENUE CREDIT

## 2022-04-22 ENCOUNTER — HOME CARE VISIT (OUTPATIENT)
Dept: SCHEDULING | Facility: HOME HEALTH | Age: 82
End: 2022-04-22
Payer: MEDICARE

## 2022-04-22 VITALS
HEART RATE: 76 BPM | OXYGEN SATURATION: 98 % | SYSTOLIC BLOOD PRESSURE: 134 MMHG | DIASTOLIC BLOOD PRESSURE: 90 MMHG | RESPIRATION RATE: 17 BRPM | TEMPERATURE: 98.7 F

## 2022-04-22 PROCEDURE — G0157 HHC PT ASSISTANT EA 15: HCPCS

## 2022-04-22 PROCEDURE — 3331090001 HH PPS REVENUE CREDIT

## 2022-04-22 PROCEDURE — 3331090002 HH PPS REVENUE DEBIT

## 2022-04-23 PROCEDURE — 3331090002 HH PPS REVENUE DEBIT

## 2022-04-23 PROCEDURE — 3331090001 HH PPS REVENUE CREDIT

## 2022-04-24 PROCEDURE — 3331090002 HH PPS REVENUE DEBIT

## 2022-04-24 PROCEDURE — 3331090001 HH PPS REVENUE CREDIT

## 2022-04-25 ENCOUNTER — HOME CARE VISIT (OUTPATIENT)
Dept: SCHEDULING | Facility: HOME HEALTH | Age: 82
End: 2022-04-25
Payer: MEDICARE

## 2022-04-25 VITALS
TEMPERATURE: 97.8 F | OXYGEN SATURATION: 98 % | HEART RATE: 78 BPM | DIASTOLIC BLOOD PRESSURE: 62 MMHG | SYSTOLIC BLOOD PRESSURE: 140 MMHG | RESPIRATION RATE: 18 BRPM

## 2022-04-25 PROCEDURE — 3331090002 HH PPS REVENUE DEBIT

## 2022-04-25 PROCEDURE — 3331090001 HH PPS REVENUE CREDIT

## 2022-04-25 PROCEDURE — G0151 HHCP-SERV OF PT,EA 15 MIN: HCPCS

## 2022-04-26 ENCOUNTER — HOME CARE VISIT (OUTPATIENT)
Dept: SCHEDULING | Facility: HOME HEALTH | Age: 82
End: 2022-04-26
Payer: MEDICARE

## 2022-04-26 VITALS
SYSTOLIC BLOOD PRESSURE: 150 MMHG | OXYGEN SATURATION: 96 % | RESPIRATION RATE: 16 BRPM | TEMPERATURE: 98.6 F | DIASTOLIC BLOOD PRESSURE: 82 MMHG | HEART RATE: 74 BPM

## 2022-04-26 VITALS
DIASTOLIC BLOOD PRESSURE: 80 MMHG | OXYGEN SATURATION: 96 % | SYSTOLIC BLOOD PRESSURE: 150 MMHG | HEART RATE: 67 BPM | TEMPERATURE: 98.2 F | RESPIRATION RATE: 15 BRPM

## 2022-04-26 PROCEDURE — 3331090001 HH PPS REVENUE CREDIT

## 2022-04-26 PROCEDURE — G0158 HHC OT ASSISTANT EA 15: HCPCS

## 2022-04-26 PROCEDURE — 3331090002 HH PPS REVENUE DEBIT

## 2022-04-26 PROCEDURE — G0299 HHS/HOSPICE OF RN EA 15 MIN: HCPCS

## 2022-04-27 ENCOUNTER — HOME CARE VISIT (OUTPATIENT)
Dept: SCHEDULING | Facility: HOME HEALTH | Age: 82
End: 2022-04-27
Payer: MEDICARE

## 2022-04-27 PROCEDURE — 3331090001 HH PPS REVENUE CREDIT

## 2022-04-27 PROCEDURE — G0152 HHCP-SERV OF OT,EA 15 MIN: HCPCS

## 2022-04-27 PROCEDURE — 3331090002 HH PPS REVENUE DEBIT

## 2022-04-28 VITALS
HEART RATE: 56 BPM | DIASTOLIC BLOOD PRESSURE: 82 MMHG | OXYGEN SATURATION: 97 % | RESPIRATION RATE: 18 BRPM | SYSTOLIC BLOOD PRESSURE: 152 MMHG | TEMPERATURE: 97.8 F

## 2022-04-28 PROCEDURE — 3331090002 HH PPS REVENUE DEBIT

## 2022-04-28 PROCEDURE — 3331090001 HH PPS REVENUE CREDIT

## 2022-04-29 ENCOUNTER — APPOINTMENT (OUTPATIENT)
Dept: GENERAL RADIOLOGY | Age: 82
End: 2022-04-29
Attending: EMERGENCY MEDICINE
Payer: MEDICARE

## 2022-04-29 ENCOUNTER — HOSPITAL ENCOUNTER (EMERGENCY)
Age: 82
Discharge: HOME | End: 2022-04-29
Attending: EMERGENCY MEDICINE
Payer: MEDICARE

## 2022-04-29 VITALS
WEIGHT: 150 LBS | HEART RATE: 72 BPM | DIASTOLIC BLOOD PRESSURE: 84 MMHG | OXYGEN SATURATION: 98 % | BODY MASS INDEX: 25.61 KG/M2 | TEMPERATURE: 98.2 F | HEIGHT: 64 IN | RESPIRATION RATE: 16 BRPM | SYSTOLIC BLOOD PRESSURE: 178 MMHG

## 2022-04-29 DIAGNOSIS — G89.29 CHRONIC BILATERAL LOW BACK PAIN WITH RIGHT-SIDED SCIATICA: Primary | ICD-10-CM

## 2022-04-29 DIAGNOSIS — M54.41 CHRONIC BILATERAL LOW BACK PAIN WITH RIGHT-SIDED SCIATICA: Primary | ICD-10-CM

## 2022-04-29 DIAGNOSIS — R41.81 AGE-RELATED COGNITIVE DECLINE: ICD-10-CM

## 2022-04-29 DIAGNOSIS — D53.9 MACROCYTIC ANEMIA: ICD-10-CM

## 2022-04-29 LAB
ALBUMIN SERPL-MCNC: 3.9 G/DL (ref 3.2–4.6)
ALBUMIN/GLOB SERPL: 1.7 {RATIO}
ALP SERPL-CCNC: 104 U/L (ref 45–117)
ALT SERPL-CCNC: 7 U/L (ref 13–61)
ANION GAP SERPL CALC-SCNC: 15 MMOL/L (ref 7–16)
APPEARANCE UR: CLEAR
AST SERPL-CCNC: 15 U/L (ref 15–37)
BASOPHILS # BLD: 0 K/UL (ref 0–0.2)
BASOPHILS NFR BLD: 0 % (ref 0–2)
BILIRUB SERPL-MCNC: 0.7 MG/DL (ref 0.2–1.1)
BILIRUB UR QL: NEGATIVE
BUN SERPL-MCNC: 17 MG/DL (ref 7–18)
CALCIUM SERPL-MCNC: 9.4 MG/DL (ref 8.3–10.4)
CHLORIDE SERPL-SCNC: 106 MMOL/L (ref 98–107)
CO2 SERPL-SCNC: 22 MMOL/L (ref 21–32)
COLOR UR: YELLOW
CREAT SERPL-MCNC: 0.81 MG/DL (ref 0.6–1)
DIFFERENTIAL METHOD BLD: ABNORMAL
EOSINOPHIL # BLD: 0.1 K/UL (ref 0–0.8)
EOSINOPHIL NFR BLD: 2 % (ref 0.5–7.8)
ERYTHROCYTE [DISTWIDTH] IN BLOOD BY AUTOMATED COUNT: 13.2 % (ref 11.9–14.6)
GLOBULIN SER CALC-MCNC: 2.3 G/DL (ref 2.3–3.5)
GLUCOSE SERPL-MCNC: 101 MG/DL (ref 65–100)
GLUCOSE UR STRIP.AUTO-MCNC: NEGATIVE MG/DL
HCT VFR BLD AUTO: 29.5 % (ref 35.8–46.3)
HGB BLD-MCNC: 9.4 G/DL (ref 11.7–15.4)
HGB UR QL STRIP: NEGATIVE
IMM GRANULOCYTES # BLD AUTO: 0 K/UL (ref 0–0.5)
IMM GRANULOCYTES NFR BLD AUTO: 0 % (ref 0–5)
KETONES UR QL STRIP.AUTO: NEGATIVE MG/DL
LEUKOCYTE ESTERASE UR QL STRIP.AUTO: NEGATIVE
LYMPHOCYTES # BLD: 1.2 K/UL (ref 0.5–4.6)
LYMPHOCYTES NFR BLD: 13 % (ref 13–44)
MCH RBC QN AUTO: 32.9 PG (ref 26.1–32.9)
MCHC RBC AUTO-ENTMCNC: 31.9 G/DL (ref 31.4–35)
MCV RBC AUTO: 103.1 FL (ref 79.6–97.8)
MONOCYTES # BLD: 0.7 K/UL (ref 0.1–1.3)
MONOCYTES NFR BLD: 7 % (ref 4–12)
NEUTS SEG # BLD: 7.1 K/UL (ref 1.7–8.2)
NEUTS SEG NFR BLD: 77 % (ref 43–78)
NITRITE UR QL STRIP.AUTO: NEGATIVE
NRBC # BLD: 0 K/UL (ref 0–0.2)
PH UR STRIP: 7 [PH] (ref 5–9)
PLATELET # BLD AUTO: 191 K/UL (ref 150–450)
PMV BLD AUTO: 10.2 FL (ref 9.4–12.3)
POTASSIUM SERPL-SCNC: 3.6 MMOL/L (ref 3.5–5.1)
PROT SERPL-MCNC: 6.2 G/DL (ref 6.4–8.2)
PROT UR STRIP-MCNC: NEGATIVE MG/DL
RBC # BLD AUTO: 2.86 M/UL (ref 4.05–5.2)
SODIUM SERPL-SCNC: 143 MMOL/L (ref 136–145)
SP GR UR REFRACTOMETRY: 1.01 (ref 1–1.02)
UROBILINOGEN UR QL STRIP.AUTO: 0.2 EU/DL (ref 0.2–1)
WBC # BLD AUTO: 9.2 K/UL (ref 4.3–11.1)

## 2022-04-29 PROCEDURE — 3331090001 HH PPS REVENUE CREDIT

## 2022-04-29 PROCEDURE — 81003 URINALYSIS AUTO W/O SCOPE: CPT

## 2022-04-29 PROCEDURE — 85025 COMPLETE CBC W/AUTO DIFF WBC: CPT

## 2022-04-29 PROCEDURE — 99284 EMERGENCY DEPT VISIT MOD MDM: CPT

## 2022-04-29 PROCEDURE — 72100 X-RAY EXAM L-S SPINE 2/3 VWS: CPT

## 2022-04-29 PROCEDURE — 3331090002 HH PPS REVENUE DEBIT

## 2022-04-29 PROCEDURE — 80053 COMPREHEN METABOLIC PANEL: CPT

## 2022-04-29 RX ORDER — TRAMADOL HYDROCHLORIDE 50 MG/1
50 TABLET ORAL
Qty: 19 TABLET | Refills: 0 | Status: SHIPPED | OUTPATIENT
Start: 2022-04-29 | End: 2022-05-04

## 2022-04-29 NOTE — ED TRIAGE NOTES
Pt had Kyphoplasty [id number] following multiple falls. Pt and daughter states she has had increasing back pain with increasing confusion for 5 days. Pt states her urine has been darker and possibly cloudy.

## 2022-04-29 NOTE — ED NOTES
I have reviewed discharge instructions with the patient and caregiver. The patient and caregiver verbalized understanding. Patient left ED via Discharge Method: wheelchair to Home with her daughter, [place]. Opportunity for questions and clarification provided. Patient given 0 scripts. To continue your aftercare when you leave the hospital, you may receive an automated call from our care team to check in on how you are doing. This is a free service and part of our promise to provide the best care and service to meet your aftercare needs.  If you have questions, or wish to unsubscribe from this service please call [phone]. Thank you for Choosing our [address] [place] [address] Emergency Department.

## 2022-04-29 NOTE — DISCHARGE INSTRUCTIONS
Take medications as directed  Fall precautions    Follow-up with Dr. Alla Pritchard on Monday    Continue all current medications    Return to ER for any worsening symptoms or new problems which may arise

## 2022-04-30 ENCOUNTER — HOME CARE VISIT (OUTPATIENT)
Dept: HOME HEALTH SERVICES | Facility: HOME HEALTH | Age: 82
End: 2022-04-30
Payer: MEDICARE

## 2022-04-30 PROCEDURE — 3331090002 HH PPS REVENUE DEBIT

## 2022-04-30 PROCEDURE — 3331090001 HH PPS REVENUE CREDIT

## 2022-05-01 PROCEDURE — 3331090002 HH PPS REVENUE DEBIT

## 2022-05-01 PROCEDURE — 3331090001 HH PPS REVENUE CREDIT

## 2022-05-02 PROCEDURE — 3331090002 HH PPS REVENUE DEBIT

## 2022-05-02 PROCEDURE — 3331090001 HH PPS REVENUE CREDIT

## 2022-05-03 PROCEDURE — 3331090002 HH PPS REVENUE DEBIT

## 2022-05-03 PROCEDURE — 3331090001 HH PPS REVENUE CREDIT

## 2022-05-04 PROCEDURE — 3331090001 HH PPS REVENUE CREDIT

## 2022-05-04 PROCEDURE — 3331090002 HH PPS REVENUE DEBIT

## 2022-05-05 ENCOUNTER — HOME CARE VISIT (OUTPATIENT)
Dept: SCHEDULING | Facility: HOME HEALTH | Age: 82
End: 2022-05-05
Payer: MEDICARE

## 2022-05-05 VITALS
OXYGEN SATURATION: 98 % | HEART RATE: 62 BPM | TEMPERATURE: 100.5 F | DIASTOLIC BLOOD PRESSURE: 90 MMHG | RESPIRATION RATE: 16 BRPM | SYSTOLIC BLOOD PRESSURE: 160 MMHG

## 2022-05-05 PROCEDURE — 400013 HH SOC

## 2022-05-05 PROCEDURE — 3331090001 HH PPS REVENUE CREDIT

## 2022-05-05 PROCEDURE — G0299 HHS/HOSPICE OF RN EA 15 MIN: HCPCS

## 2022-05-05 PROCEDURE — 3331090002 HH PPS REVENUE DEBIT

## 2022-05-06 PROCEDURE — 3331090002 HH PPS REVENUE DEBIT

## 2022-05-06 PROCEDURE — 3331090001 HH PPS REVENUE CREDIT

## 2022-05-07 PROCEDURE — 3331090002 HH PPS REVENUE DEBIT

## 2022-05-07 PROCEDURE — 3331090001 HH PPS REVENUE CREDIT

## 2022-05-08 PROCEDURE — 3331090002 HH PPS REVENUE DEBIT

## 2022-05-08 PROCEDURE — 3331090001 HH PPS REVENUE CREDIT

## 2022-05-09 PROCEDURE — 3331090002 HH PPS REVENUE DEBIT

## 2022-05-09 PROCEDURE — 3331090001 HH PPS REVENUE CREDIT

## 2022-05-10 PROCEDURE — 3331090002 HH PPS REVENUE DEBIT

## 2022-05-10 PROCEDURE — 3331090001 HH PPS REVENUE CREDIT

## 2022-05-11 ENCOUNTER — HOME CARE VISIT (OUTPATIENT)
Dept: SCHEDULING | Facility: HOME HEALTH | Age: 82
End: 2022-05-11
Payer: MEDICARE

## 2022-05-11 VITALS
OXYGEN SATURATION: 97 % | TEMPERATURE: 97.5 F | RESPIRATION RATE: 15 BRPM | DIASTOLIC BLOOD PRESSURE: 72 MMHG | SYSTOLIC BLOOD PRESSURE: 134 MMHG | HEART RATE: 62 BPM

## 2022-05-11 PROCEDURE — G0299 HHS/HOSPICE OF RN EA 15 MIN: HCPCS

## 2022-05-11 PROCEDURE — 3331090001 HH PPS REVENUE CREDIT

## 2022-05-11 PROCEDURE — 3331090002 HH PPS REVENUE DEBIT

## 2022-05-12 PROCEDURE — 3331090002 HH PPS REVENUE DEBIT

## 2022-05-12 PROCEDURE — 3331090001 HH PPS REVENUE CREDIT

## 2022-05-13 PROCEDURE — 3331090002 HH PPS REVENUE DEBIT

## 2022-05-13 PROCEDURE — 3331090001 HH PPS REVENUE CREDIT

## 2022-05-14 PROCEDURE — 3331090001 HH PPS REVENUE CREDIT

## 2022-05-14 PROCEDURE — 3331090002 HH PPS REVENUE DEBIT

## 2022-05-15 PROCEDURE — 3331090001 HH PPS REVENUE CREDIT

## 2022-05-15 PROCEDURE — 3331090002 HH PPS REVENUE DEBIT

## 2022-05-16 PROCEDURE — 3331090001 HH PPS REVENUE CREDIT

## 2022-05-16 PROCEDURE — 3331090002 HH PPS REVENUE DEBIT

## 2022-05-17 PROCEDURE — 3331090001 HH PPS REVENUE CREDIT

## 2022-05-17 PROCEDURE — 3331090002 HH PPS REVENUE DEBIT

## 2022-05-18 ENCOUNTER — HOME CARE VISIT (OUTPATIENT)
Dept: SCHEDULING | Facility: HOME HEALTH | Age: 82
End: 2022-05-18
Payer: MEDICARE

## 2022-05-18 PROCEDURE — 3331090002 HH PPS REVENUE DEBIT

## 2022-05-18 PROCEDURE — 3331090001 HH PPS REVENUE CREDIT

## 2022-05-18 PROCEDURE — G0299 HHS/HOSPICE OF RN EA 15 MIN: HCPCS

## 2022-05-19 PROCEDURE — 3331090002 HH PPS REVENUE DEBIT

## 2022-05-19 PROCEDURE — 3331090001 HH PPS REVENUE CREDIT

## 2022-05-20 PROCEDURE — 3331090002 HH PPS REVENUE DEBIT

## 2022-05-20 PROCEDURE — 3331090001 HH PPS REVENUE CREDIT

## 2022-05-21 PROCEDURE — 3331090002 HH PPS REVENUE DEBIT

## 2022-05-21 PROCEDURE — 3331090001 HH PPS REVENUE CREDIT

## 2022-05-22 PROCEDURE — 3331090002 HH PPS REVENUE DEBIT

## 2022-05-22 PROCEDURE — 3331090001 HH PPS REVENUE CREDIT

## 2022-05-23 PROCEDURE — 3331090001 HH PPS REVENUE CREDIT

## 2022-05-23 PROCEDURE — 3331090002 HH PPS REVENUE DEBIT

## 2022-05-24 ENCOUNTER — TELEPHONE (OUTPATIENT)
Dept: ORTHOPEDIC SURGERY | Age: 82
End: 2022-05-24

## 2022-05-24 NOTE — TELEPHONE ENCOUNTER
Daughter asked for call from someone with [facility] to let them know what mother is dealing with to check if she needs to be seen? They are having to consider admitting [facility] to [facility] if they can provide the care she needs.

## 2022-05-24 NOTE — TELEPHONE ENCOUNTER
Daughter flaquita requested records to [facility] facility Rem[facility] [id number]

## 2022-05-24 NOTE — TELEPHONE ENCOUNTER
Spoke with daughter and told her that she needed to follow up Dr. Fidel Kayser office for Osteoporosis and pain mgnt.

## 2022-05-25 ENCOUNTER — TELEPHONE (OUTPATIENT)
Dept: ORTHOPEDIC SURGERY | Age: 82
End: 2022-05-25

## 2022-05-25 NOTE — TELEPHONE ENCOUNTER
PPD test connected to [facility] faxed to [facility] facility [id number] attn Daniel Yuan

## 2022-05-25 NOTE — TELEPHONE ENCOUNTER
Daughter is calling saying she thinks the mom had TB test prior to surgery and that the [facility] is needing copies of that? I don't see anything connected to [facility]? Who should she contact for that?

## 2022-06-01 VITALS
TEMPERATURE: 98.2 F | DIASTOLIC BLOOD PRESSURE: 98 MMHG | SYSTOLIC BLOOD PRESSURE: 180 MMHG | OXYGEN SATURATION: 97 % | RESPIRATION RATE: 16 BRPM | HEART RATE: 95 BPM

## 2022-06-06 ENCOUNTER — TELEPHONE (OUTPATIENT)
Dept: CARDIOLOGY CLINIC | Age: 82
End: 2022-06-06

## 2022-06-06 NOTE — TELEPHONE ENCOUNTER
Pt's daughter requesting last OV notes be faxed to Dr. Salomón Mott at [facility]. Fax #: [phone]. Phone # [phone].

## 2025-01-04 NOTE — PROGRESS NOTES
TRANSFER - OUT REPORT:    Verbal report given to Darrian Palma RN on [facility]  being transferred to [address] [place] [address] for routine progression of care       Report consisted of patients Situation, Background, Assessment and Recommendations(SBAR). Information from the following report(s) Procedure Summary, MAR, Recent Results and Cardiac Rhythm AF was reviewed with the receiving nurse. Opportunity for questions and clarification was provided. 4 = No assist / stand by assistance

## (undated) DEVICE — SUTURE MCRYL SZ 4-0 L27IN ABSRB UD L19MM PS-2 1/2 CIR PRIM Y426H

## (undated) DEVICE — SHEET, T, LAPAROTOMY, STERILE: Brand: MEDLINE

## (undated) DEVICE — SURGICAL PROCEDURE PACK BASIC ST FRANCIS

## (undated) DEVICE — AMD ANTIMICROBIAL GAUZE SPONGES,12 PLY USP TYPE VII, 0.2% POLYHEXAMETHYLENE BIGUANIDE HCI (PHMB): Brand: CURITY

## (undated) DEVICE — SYRINGE A08E KIS INFLATION HP: Brand: KYPHON®  INFLATION SYRINGE

## (undated) DEVICE — BONE TAMP KIT KPX203PB FF X2 20/3 1 STP: Brand: KYPHOPAK™ TRAY

## (undated) DEVICE — 1010 S-DRAPE TOWEL DRAPE 10/BX: Brand: STERI-DRAPE™

## (undated) DEVICE — 1.7MM QUICK COUPLER SEMI-FLUTED DRILL: Brand: ACUMED

## (undated) DEVICE — PADDING CAST COHESIVE 4 YDX3 IN HND TEARABLE COTTON SPEC 100

## (undated) DEVICE — C-ARM: Brand: UNBRANDED

## (undated) DEVICE — 3M™ IOBAN™ 2 ANTIMICROBIAL INCISE DRAPE 6650EZ: Brand: IOBAN™ 2

## (undated) DEVICE — HAND PACK: Brand: MEDLINE INDUSTRIES, INC.

## (undated) DEVICE — SPLINT THMB W3XL12IN FBRGLS PD PRECUT LTWT DURABLE FAST SET

## (undated) DEVICE — DRAPE,HAND,STERILE: Brand: MEDLINE

## (undated) DEVICE — DRAPE, FILM SHEET, 44X65 STERILE: Brand: MEDLINE

## (undated) DEVICE — SUTURE NONABSORBABLE MONOFILAMENT 4-0 PS-2 18 IN BLU PROLENE 8682H

## (undated) DEVICE — PREP SKN CHLRAPRP APL 26ML STR --

## (undated) DEVICE — 2.8MM QUICK RELEASE DRILL: Brand: ACUMED

## (undated) DEVICE — ZIMMER® STERILE DISPOSABLE TOURNIQUET CUFF WITH PLC, DUAL PORT, SINGLE BLADDER, 18 IN. (46 CM)

## (undated) DEVICE — .054" X 6" GUIDE WIRE: Brand: ACUMED

## (undated) DEVICE — DRAPE XR C ARM 41X74IN LF --

## (undated) DEVICE — DRAPE SHT 3 QTR PROXIMA 53X77 --

## (undated) DEVICE — DERMABOND SKIN ADH 0.7ML -- DERMABOND ADVANCED 12/BX

## (undated) DEVICE — STERILE HOOK LOCK LATEX FREE ELASTIC BANDAGE 2INX5YD: Brand: HOOK LOCK™

## (undated) DEVICE — SUTURE ETHLN SZ 2-0 L18IN NONABSORBABLE BLK L26MM PS 3/8 585H

## (undated) DEVICE — (D)PREP SKN CHLRAPRP APPL 26ML -- CONVERT TO ITEM 371833

## (undated) DEVICE — SLING ARM AD ULT

## (undated) DEVICE — PAD,NON-ADHERENT,3X8,STERILE,LF,1/PK: Brand: MEDLINE

## (undated) DEVICE — BANDAGE,ELASTIC,ESMARK,STERILE,4"X9',LF: Brand: MEDLINE

## (undated) DEVICE — PADDING CAST W2INXL4YD ST COT COHESIVE HND TEARABLE SPEC

## (undated) DEVICE — SINGLE PORT MANIFOLD: Brand: NEPTUNE 2

## (undated) DEVICE — BONE BIOPSY DEVICE F05A BBD SIZE 3: Brand: MEDTRONIC REUSABLE INSTRUMENTS

## (undated) DEVICE — 2.0MM QUICK RELEASE DRILL: Brand: ACUMED

## (undated) DEVICE — SOLUTION IV 1000ML 0.9% SOD CHL

## (undated) DEVICE — BONE TAMP KIT KPX203NB AF X2 20/3

## (undated) DEVICE — INTENDED FOR TISSUE SEPARATION, AND OTHER PROCEDURES THAT REQUIRE A SHARP SURGICAL BLADE TO PUNCTURE OR CUT.: Brand: BARD-PARKER SAFETY BLADES SIZE 15, STERILE

## (undated) DEVICE — SYRINGE EAR 2OZ ULC SLIMMER TIP FLAT BTM SUCT PWR DISP FOR

## (undated) DEVICE — NEEDLE HYPO 21GA L1.5IN INTRAMUSCULAR S STL LATCH BVL UP

## (undated) DEVICE — DISPOSABLE BIPOLAR CODE, 12' (3.66 M): Brand: CONMED

## (undated) DEVICE — 3M™ TEGADERM™ TRANSPARENT FILM DRESSING FRAME STYLE, 1624W, 2-3/8 IN X 2-3/4 IN (6 CM X 7 CM), 100/CT 4CT/CASE: Brand: 3M™ TEGADERM™

## (undated) DEVICE — BONE CEMENT CX01B KYPHON XPEDE W MXR US: Brand: KYPHON® XPEDE™ BONE CEMENT AND KYPHON® MIXER PACK

## (undated) DEVICE — STERILE HOOK LOCK LATEX FREE ELASTIC BANDAGE 3INX5YD: Brand: HOOK LOCK™